# Patient Record
Sex: MALE | Race: WHITE | Employment: FULL TIME | URBAN - METROPOLITAN AREA
[De-identification: names, ages, dates, MRNs, and addresses within clinical notes are randomized per-mention and may not be internally consistent; named-entity substitution may affect disease eponyms.]

---

## 2017-02-28 ENCOUNTER — MEDICAL CORRESPONDENCE (OUTPATIENT)
Dept: HEALTH INFORMATION MANAGEMENT | Facility: CLINIC | Age: 32
End: 2017-02-28

## 2017-02-28 ENCOUNTER — OFFICE VISIT (OUTPATIENT)
Dept: CONSULT | Facility: CLINIC | Age: 32
End: 2017-02-28
Attending: GENETIC COUNSELOR, MS
Payer: COMMERCIAL

## 2017-02-28 DIAGNOSIS — M89.369: Primary | ICD-10-CM

## 2017-02-28 PROCEDURE — 96040 ZZH GENETIC COUNSELING, EACH 30 MINUTES: CPT | Mod: ZF | Performed by: GENETIC COUNSELOR, MS

## 2017-02-28 PROCEDURE — 81479 UNLISTED MOLECULAR PATHOLOGY: CPT | Performed by: MEDICAL GENETICS

## 2017-02-28 PROCEDURE — 81401 MOPATH PROCEDURE LEVEL 2: CPT | Performed by: MEDICAL GENETICS

## 2017-02-28 PROCEDURE — 84999 UNLISTED CHEMISTRY PROCEDURE: CPT | Performed by: MEDICAL GENETICS

## 2017-02-28 NOTE — PROGRESS NOTES
2017            Mr. Manuel Goddard   [address]   [facility] [address]      RE: Manuel Goddard   MRN: [id number]   : 1985      Dear Alcides:      It was a pleasure meeting with you on 2017 in our Genetics Clinic at the [facility].  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  Following today's visit, you had your blood drawn for Levy-Wiedemann methylation studies which were sent to [facility] and blood was also drawn and held for additional genetic studies WT1, PTEN and PIK3CA that would be performed here at the [facility] if the methylation studies are negative.  We will have our support staff submit a prior authorization for those additional studies.      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    You stated that you not have any other medical issues such as an omphalocele, large tongue or history of tumors.  You do have mild mitral valve prolapse.  You have an older brother and older sister who are healthy.  Your mom did have thyroid cancer in her 30s and 40s.  Otherwise, the remainder of your family history was unremarkable. (see scanned pedigree)     We reviewed that individuals having hypertrophy of one limb sometimes can have methylation differences and the Levy-Wiedemann gene.  Methylation studies are available through [facility].  In addition, sometimes individuals can have mutations in other genes that sometimes can be associated with other medical management issues.  These genes, including  WT1, PTEN, PIK3CA.  Thus, you were interested in just for general knowledge to find out if there is underlying etiology for your differences in your leg length.      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  As soon as the results become available in 2-3 weeks, we will contact you by phone.  Blood was also drawn and held for potential other studies at [facility], specifically WT1, PTEN and PIK3CA.  If the methylation studies are normal, then we will proceed with those studies.  In the meantime, we will have our support staff submit a prior authorization to your insurance for the additional gene studies.  If you have any further questions with which I can be of assistance, please feel free to contact me at [phone].      Sincerely,      Selma Baldwin MS, [facility] [place]   Certified Genetic Counselor   Time spent:  30 minutes     cc:   Jailene Ivory MD   [address] [address]

## 2017-02-28 NOTE — MR AVS SNAPSHOT
After Visit Summary   2/28/2017    Manuel Goddard    MRN: [id number]           Patient Information     Date Of Birth          1985        Visit Information        Provider Department      2/28/2017 2:00 PM Selma Baldwin [facility] Peds Genetics        Today's Diagnoses     Hypertrophy of bone of lower leg    -  1       Follow-ups after your visit        Your next 10 appointments already scheduled     Sep 19, 2017  7:45 AM CDT   New Genetic Visit with MD [facility] ([facility])    [facility]  [address]  [address] [facility] [address] [phone]              Who to contact     Please call your clinic at [phone] to:    Ask questions about your health    Make or cancel appointments    Discuss your medicines    Learn about your test results    Speak to your doctor   If you have compliments or concerns about an experience at your clinic, or if you wish to file a complaint, please contact [facility] Patient Relations at [phone] or email us at [email or web address]         Additional Information About Your Visit        MyChart Information     [employer] gives you secure access to your electronic health record. If you see a primary care provider, you can also send messages to your care team and make appointments. If you have questions, please call your primary care clinic.  If you do not have a primary care provider, please call [phone] and they will assist you.      [employer] is an electronic gateway that provides easy, online access to your medical records. With [employer], you can request a clinic appointment, read your test results, renew a prescription or communicate with your care team.     To access your existing account, please contact your [facility] or call [phone] for assistance.        Care EveryWhere ID     This is your Care EveryWhere ID. This could be used by  other organizations to access your [facility] medical records  [id number]         Blood Pressure from Last 3 Encounters:   02/26/16 117/68   09/25/15 123/74   06/17/15 109/71    Weight from Last 3 Encounters:   02/26/16 147 lb (66.7 kg)   09/25/15 151 lb (68.5 kg)   08/25/15 145 lb (65.8 kg)              We Performed the Following     [place] Miscellaneous Test     Next Generation Sequencing        Primary Care Provider Office Phone # Fax #    Jailene Elaine Ivory [phone]       [facility] [address] [facility] [address]  [facility] [address]        Thank you!     Thank you for choosing [facility]  for your care. Our goal is always to provide you with excellent care. Hearing back from our patients is one way we can continue to improve our services. Please take a few minutes to complete the written survey that you may receive in the mail after your visit with us. Thank you!             Your Updated Medication List - Protect others around you: Learn how to safely use, store and throw away your medicines at www.disposemymeds.org.          This list is accurate as of: 2/28/17 11:59 PM.  Always use your most recent med list.                   Brand Name Dispense Instructions for use    clonazePAM 1 MG tablet    klonoPIN    30 tablet    Take 1 tablet (1 mg) by mouth nightly as needed for anxiety

## 2017-02-28 NOTE — LETTER
2017      RE: Manuel Goddard  [address]     [facility] [address]       2017            Mr. Manuel Goddard   [address]   [facility] [address]      RE: Manuel Goddard   MRN: [id number]   : 1985      Dear Alcides:      It was a pleasure meeting with you on 2017 in our Genetics Clinic at the [facility].  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  Following today's visit, you had your blood drawn for Levy-Wiedemann methylation studies which were sent to [employer] [facility] and blood was also drawn and held for additional genetic studies WT1, PTEN and PIK3CA that would be performed here at the [facility] if the methylation studies are negative.  We will have our support staff submit a prior authorization for those additional studies.      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    You stated that you not have any other medical issues such as an omphalocele, large tongue or history of tumors.  You do have mild mitral valve prolapse.  You have an older brother and older sister who are healthy.  Your mom did have thyroid cancer in her 30s and 40s.  Otherwise, the remainder of your family history was unremarkable. (see scanned pedigree)     We reviewed that individuals having hypertrophy of one limb sometimes can have methylation differences and the Levy-Wiedemann gene.  Methylation studies are available through [employer].  In addition, sometimes individuals can have mutations in other  genes that sometimes can be associated with other medical management issues.  These genes, including WT1, PTEN, PIK3CA.  Thus, you were interested in just for general knowledge to find out if there is underlying etiology for your differences in your leg length.      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  As soon as the results become available in 2-3 weeks, we will contact you by phone.  Blood was also drawn and held for potential other studies at [facility], specifically WT1, PTEN and PIK3CA.  If the methylation studies are normal, then we will proceed with those studies.  In the meantime, we will have our support staff submit a prior authorization to your insurance for the additional gene studies.  If you have any further questions with which I can be of assistance, please feel free to contact me at [phone].      Sincerely,      Selma Baldwin MS, [facility] [place]   Certified Genetic Counselor   Time spent:  30 minutes     cc:   Jailene Ivory MD   [address] [address]

## 2017-03-14 LAB — COPATH REPORT: NORMAL

## 2017-03-15 ENCOUNTER — HOSPITAL ENCOUNTER (OUTPATIENT)
Facility: CLINIC | Age: 32
Setting detail: SPECIMEN
Discharge: HOME | End: 2017-03-15
Admitting: MEDICAL GENETICS
Payer: COMMERCIAL

## 2017-03-15 ENCOUNTER — TRANSFERRED RECORDS (OUTPATIENT)
Dept: HEALTH INFORMATION MANAGEMENT | Facility: CLINIC | Age: 32
End: 2017-03-15

## 2017-03-15 DIAGNOSIS — M89.369: Primary | ICD-10-CM

## 2017-03-15 LAB
RESULT: NORMAL
SEND OUTS MISC TEST CODE: NORMAL
SEND OUTS MISC TEST SPECIMEN: NORMAL
TEST NAME: NORMAL

## 2017-03-15 PROCEDURE — 81479 UNLISTED MOLECULAR PATHOLOGY: CPT | Performed by: MEDICAL GENETICS

## 2017-03-15 PROCEDURE — 40000803 ZZHCL STATISTIC DNA ISOL HIGH PURITY: Performed by: MEDICAL GENETICS

## 2017-03-15 PROCEDURE — 81405 MOPATH PROCEDURE LEVEL 6: CPT | Performed by: MEDICAL GENETICS

## 2017-03-15 PROCEDURE — 81321 PTEN GENE FULL SEQUENCE: CPT | Performed by: MEDICAL GENETICS

## 2017-03-15 NOTE — PROGRESS NOTES
WE HAVE RECEIVED APPROVAL FROM [facility] FOR GENETIC TESTING FOR WT1, PTEN and PIK3CA:    Manuel Goddard  Male, 31 yrs, 1985  MRN:   [id number]    Per Nicholas Saab  (see scanned letter).

## 2017-03-16 NOTE — LETTER
To Alcides Goddard   [address]     [facility] [address]      March 16, 2017      Dear Alcides,    As you know, we spoke with each other on 3/15/17 regarding normal BWRS methylation results. We informed you that your insurance was approved for the additional next generation sequencing genes.  Alcides wished to proceed with genes WT1, PTEN, PIK3CA and also wanted to add on AKT1. We informed lab and we will contact you again once these results are available.     Selam Baldwin MS, [facility] [place]  Certified Genetic Counselor  [phone]    SEE NOTE 03/15/2017 04:02 PM   (Note)     Test                            Result        Flag  Unit  RefValue   ------------------------------------------------------------------   BWS/RSS Molecular Analysis    Result Summary                NEGATIVE    Result                        SEE NOTE      IC1 (H19): normal methylation      IC2 (LIT1): normal methylation        No deletions or duplications were identified.    Interpretation                SEE NOTE      These results decrease the likelihood but do not rule out      the diagnosis of Levy-Wiedemann syndrome (BWS). Some      individuals with a clinical diagnosis of BWS do not have a      detectable abnormality in the 11p15 critical regions. This      assay does not assess for other causes of BWS, including      point mutations in the CDKN1C gene. If not already      performed, genetic testing of the CDKN1C gene (CDKZ/CDKN1C      Gene, Full Gene Analysis) may provide additional diagnostic      information.        Additionally, methylation abnormalities may not be detected      in the presence of low level mosaicism.        A genetic consultation may be of benefit.      Methylation-sensitive multiplex ligation-dependent probe      amplification (MLPA) was used to test for the presence of      large deletions, duplications and/or methylation defects in      the IC1 (H19) and IC2 (LIT1) critical regions on chromosome      11p15.      An  online research opportunity called GenomeConnect      (genomeconnect.org), a project of [employer], is available for      the recipient of this genetic test. This patient registry      collects de-identified genetic and health information to      advance the knowledge of genetic variants. [facility] is a      collaborator of [employer]. This may not be applicable for all      tests.        Test results should be interpreted in the context of      clinical findings, family history, and other laboratory      data. Misinterpretation of results may occur if the      information provided is inaccurate or incomplete.        Rare polymorphisms exist that could lead to false-negative      or false-positive results. If results obtained do not match      the clinical findings, additional testing should be      considered.        Bone Marrow transplants from allogenic donors will      interfere with testing. Call [facility] for      instructions for testing patients who have received a bone      marrow transplant.        Multiple in-silico evaluation tools may have been used to      assist in the interpretation of these results. Of note, the      sensitivity and specificity of these tools for the      determination of pathogenicity is currently unvalidated.        This test was developed and its performance characteristics      determined by [facility] in a manner consistent with CLIA      requirements. This test has not been cleared or approved by      the U.S. Food and Drug Administration.    Reason for Referral           SEE NOTE      Evaluation for a diagnosis of Levy Wiedemann syndrome      (BWS). Analyze the IC1 (H19) and IC2 (LIT1) critical      regions.    Specimen                      WB Whole Blood    Released By      Sandy Qureshi M.D.        Test Performed by:      [address] [address]      Test Name 02/28/2017  2:39  PM 13   GABE WIEDEMANN SYNDROME   Send Outs Misc Test Code 02/28/2017  2:39 PM 13   BWRS   Send Outs Misc Test Specimen 02/28/2017  2:39 PM 13   Whole blood, EDTA anticoagulant

## 2017-03-17 DIAGNOSIS — M89.369: Primary | ICD-10-CM

## 2017-04-26 LAB — COPATH REPORT: NORMAL

## 2017-04-27 NOTE — LETTER
To Manuel Goddard  [address]     [facility] [address]    April 27, 2017      Dear Alcides,    I left a voicemail message with you on 4/27/17 regarding the results of your recent genetic testing.  According to [facility], you have normal methylation studies of H19/Lit1.  According to [facility], Molecular diagnostics lab, no pathogenic variants were found in WTI, PTEN, PIK3CA, AKT1.    Feel free to contact me with additional questions.    Sincerely,    Selma Baldwin MS, [facility]  Certified Genetic Counselor  [phone]      BWS/RSS Molecular Analysis    Result Summary                NEGATIVE    Result                        SEE NOTE      IC1 (H19): normal methylation      IC2 (LIT1): normal methylation        No deletions or duplications were identified.    Interpretation                SEE NOTE      These results decrease the likelihood but do not rule out      the diagnosis of Levy-Wiedemann syndrome (BWS). Some      individuals with a clinical diagnosis of BWS do not have a      detectable abnormality in the 11p15 critical regions. This      assay does not assess for other causes of BWS, including      point mutations in the CDKN1C gene. If not already      performed, genetic testing of the CDKN1C gene (CDKZ/CDKN1C      Gene, Full Gene Analysis) may provide additional diagnostic      information.        Additionally, methylation abnormalities may not be detected      in the presence of low level mosaicism.        A genetic consultation may be of benefit.      Methylation-sensitive multiplex ligation-dependent probe      amplification (MLPA) was used to test for the presence of      large deletions, duplications and/or methylation defects in      the IC1 (H19) and IC2 (LIT1) critical regions on chromosome      11p15.      An online research opportunity called Home[employer]com      (RealLifeConnect.org), a project of [employer], is available for      the recipient of this genetic test. This patient  registry      collects de-identified genetic and health information to      advance the knowledge of genetic variants. [facility] is a      collaborator of [employer]. This may not be applicable for all      tests.        Test results should be interpreted in the context of      clinical findings, family history, and other laboratory      data. Misinterpretation of results may occur if the      information provided is inaccurate or incomplete.        Rare polymorphisms exist that could lead to false-negative      or false-positive results. If results obtained do not match      the clinical findings, additional testing should be      considered.        Bone Marrow transplants from allogenic donors will      interfere with testing. Call [facility] for      instructions for testing patients who have received a bone      marrow transplant.        Multiple in-silico evaluation tools may have been used to      assist in the interpretation of these results. Of note, the      sensitivity and specificity of these tools for the      determination of pathogenicity is currently unvalidated.        This test was developed and its performance characteristics      determined by [facility] in a manner consistent with CLIA      requirements. This test has not been cleared or approved by      the U.S. Food and Drug Administration.    Reason for Referral           SEE NOTE      Evaluation for a diagnosis of Levy Wiedemann syndrome      (BWS). Analyze the IC1 (H19) and IC2 (LIT1) critical      regions.    Specimen                      WB Whole Blood    Released By      Sandy Qureshi M.D.        Test Performed by:      [place] [address]       Patient Name: DOMONIQUE WEI   MR#: [id number]   Specimen #: [id number]   Collected: 2/28/2017 14:36   Received: 3/17/2017 11:33   Reported: 4/26/2017 16:28   Ordering Phy(s): VELASQUEZ A BERRY    Additional Phy(s): JOSH HOPPER     For improved result formatting, select 'View Enhanced Report Format'   under Linked Documents section.   _________________________________________     TEST(S) REQUESTED:   Next Generation Sequencing     SPECIMEN DESCRIPTION:   Blood     TEST REQUESTED: Hypertrophy of one limb. A customized panel. Next   generation sequencing and copy number variation analysis of: WT1, PTEN,   PIK3CA, AKT1.     RESULTS:          NEGATIVE     INTERPRETATION:   No pathogenic sequence variants or clinically significant copy number   variants were detected in the genes analyzed. Therefore, a genetic cause   for this patient's symptoms was not identified. It should be noted that   testing was run on a peripheral blood specimen, not the affected tissue.    This may miss somatic mutations of the tested genes that are associated   with the reported clinical phenotype.  Testing of affected tissue should   also be considered.  Genetic counseling regarding these results is   recommended.     BACKGROUND:   A custom panel of genes associated with hypertrophy of one limb was   requested by the ordering provider. Four genes were included, which are   WT1, PTEN, PIK3CA, and AKT1. The WT1 gene is associated with WT1-related   Wilms tumor syndromes. The PTEN gene is associated with PTEN hamartoma   tumor syndromes, which include Zyphrriz-Nhank-Geqdwguvm syndrome, Cowden   syndrome 1, PTEN-related Proteus syndrome, and Proteus-like Syndrome.   The PIK3CA and AKT1 genes have also been associated with Cowden   syndrome.     COVERAGE:   This analysis is limited to the coding exons and immediately adjoining   intronic sequences of the analyzed genes.  Unless specifically indicated   below, all analyzed coding exonic bases have either a minimum of 20x   coverage or have been analyzed by [place] sequencing.  Coverage at 20x is   not guaranteed for intronic positions. Therefore, intronic variants   cannot be confirmed or  excluded with the same degree of confidence as   coding exonic variants.  Sequences that reside more than 25 base pairs   from a coding exon are not genotyped and mutations in these regions will   not be detected by this analysis.     Any coding bases that are covered at less than 20x but greater than 15x   are specifically indicated below.  The sensitivity for detection of   heterozygous positions in these areas is reduced to 98.8-99.9% (assuming   the distribution of variant/reference calls is between 30%/70% and   70%/30%).     Gene     Coordinates     % of bases with >20x coverage   [facility]     [id number]     98.3     Any coding bases that are covered at less than 15x AND were not analyzed   by [facility] sequencing are reported below.  These regions are not [place]   sequenced in panels with greater than 25 genes or in cases where clearly   pathogenic mutation(s) are detected.  In addition, some regions cannot   be sequenced due to other variables such as GC content and/or repetitive   sequence.  Mutations cannot be confidently excluded in these regions.     Gene     Coordinates     % of bases with >15x coverage   None     METHODOLOGY [Abad et al., 2015][Harinder et al., 2014]:   Genomic DNA is extracted from the sample, and sequencing libraries are   prepared according to standard Illumina protocols utilizing the [employer]   One Sequencing Panel for enrichment of targeted genes.  The enriched DNA   libraries are sequenced on an Illumina HiSeq 2500 instrument.  Raw   sequencing reads are mapped to the reference genome using BWA. Raw   alignment files are realigned in the neighborhood of indels, and   recalibrated for base quality accuracy using the Genome Analysis Tool   Kit (GATK). Point mutation and indel calls in exons and adjoining   intronic regions are made using the GATK Unified Genotyper.  Variants   are interpreted according to guidance issued by the American College of   Medical Genetics [Mathew  "et al.2015].     Pathogenic and predicted pathogenic variants that meet ALL of the   following criteria are reported without validation by [facility] sequencin- single nucleotide substitution, 2- receives a \"PASS\" from the   variant recalibrator, 3- has a QUAL score of >300, 4- has a VAF in the   accepted range (heterozygous = 0.3-0.6, homozygous/hemizygous >0.9), and   5- has a minimum of 20x coverage.  Pathogenic variants that fail to meet   any of these criteria are verified by [place] sequencing prior to   reporting [Raymond et al., 2015].     For copy number variation (CNV) analysis, raw sequencing reads are   mapped to the reference human genome (HG19) using both BWA and Bowtie   algorithms.  CNV ratios are computed by comparing the average coverage   in the sample across 60 base pair windows.  Average coverage is compared   to control loci both within the sample and within a gender-matched   control sample from the same run.  The BWA/Bowtie coverage ratio is   calculated for each window in order to identify regions where the   presence of homologous sequences precludes accurate CNV calls.   Heterozygous deletion calls are made when 3 consecutive windows have a   CNV ratio of 0.3-0.7; homozygous/hemizygous deletion calls are made when   3 consecutive windows have a CNV ratio <0.3;duplication calls are made   when 5 consecutive windows have a CNV ratio >1.4.  Calls are only made   in areas where the BWA/Bowtie ratio is 0.9-1.1 and the average coverage   is 20x.  All CNV calls are confirmed with a supplementary qPCR assay.     ADDITIONAL VARIANTS IDENTIFIED:   The following types of variants are not included in the clinical report,   but information about these variants is available upon request:     * Missense variants that are present at >1% MAF in population databases   AND are not reported as pathogenic/likely pathogenic in ClinVar.   * Missense variants with a MAF <1%, that are classified as benign or "   likely benign according to published ACMG criteria.   * Synonymous variants that do not occur at intron/exon boundaries, are   not reported as pathogenic mutations in relevant clinical databases, and   are present in 15 or more individuals in ExAC.   * Intronic variants that reside more than 5 bases from the exon/intron   boundary AND are not reported as pathogenic/likely pathogenic in   ClinVar.  Known pathogenic variants residing 5-25bp from an intron/exon   boundary will be reported.   * Untranslated region (UTR) variants not previously categorized as   pathogenic or likely pathogenic in relevant clinical databases.   * Some variants may be excluded based on review of sequencing quality   data.  It is possible that some low quality variants are, in fact, real.     LIMITATIONS: This analysis has not been validated for detection of   insertion/deletion mutations larger than 18bp in length. In addition,   this analysis will not detect large structural variations, such as whole   gene deletions. The size of polymorphic repetitive sequences such as CAG   repeats, intronic dinucleotide repeats, or intronic polyT/polyA   sequences, cannot be determined by this analysis.     The CNV algorithm is not validated to detect deletions encompassing less   than 180 base pairs of coding sequence or duplications encompassing less   than 300 base pairs of coding sequence.  The CNV algorithm does not   define precise breakpoints for duplications or deletions.     REFERENCES:   Raymond MONROE, Susan WARREN, Sofia COTTRELL, Artur NAM, Harinder CARSON, et al. 2015.   Criteria for Clinical Reporting of Variants from a Broad Target Capture   NGS Assay without Donora Verification. JSM biomarkers 2(1):1005.     Sofia Hubbard, Artur Estevez, Desean M, Zeus KA,   Matthias B. 2016. CNV-RF Is a Random Bland-Based Copy Number   Variation Detection Method Using Next-Generation Sequencing. J Mol   Diagn. 18(6):872-881.     Harinder CARSON,  Darci Reeves MD, Monica PARSONS, Alessandra CHU, Forest OLGUIN, Abad KWON,   Desean WARREN, Susan WARREN, Zeus SAMUEL, Matthias B. 2014.   Implementation of Cloud based next generation sequencing data analysis   in a clinical laboratory. BMC Res Notes. 7:314.     Mathew CS, Jovany S, Wendi DB, Harris S, Reginald WW, Sergio MR, Everardo E,   Macho BE, Molecular Subcommittee of the ACMG Laboratory    Committee. 2008. ACMG recommendations for standards for interpretation   and reporting of sequence variations: Revisions 2007. [place]. Med.   10(4):294-300.     Nunu JA, Juanita AW, O'Mike TD, Prabhu W, Victor Hugo EE, Gisselle S, Ale S,    R, Wilson X, Eliazar G, Geronimo HM, Jesse D, Amara SM, Kt S, Moy MJ,   Robbin G, Katerine D, Ijeoma DA, Juno E, Chencho S,   Chriss CD, Blanca TOLEDO, Mayank JM, [facility] GO, Marshall GO, NHL Exome   Sequencing Project. 2012. Evolution and functional impact of rare coding   variation from deep sequencing of human exomes. Science. 337(7490):64-9.     Abad KWON, Forest OLGUIN, Yissel K, Huma T, Susan WARREN, Desean WARREN, Harinder CARSON,   Lemuel PARSONS, Denny PARSONS, Rah Y, Darci Nguyen MD, Alessandra STARKEY,   Zeus SAMUEL, Matthias B. 2015. Clinical validation of targeted   next-generation sequencing for inherited disorders. Arch. Pathol. Lab.   Med. 139(2):204-10.     If a patient is the recipient of an allogeneic bone marrow transplant,   this test must be done on a pre-transplant sample or buccal swab.  A   previous allogeneic bone marrow transplant will interfere with test   results.  Call the [facility]([phone]) for   instructions on sample collection for these patients.     This test was developed and its performance characteristics determined   by the [facility],  Molecular Diagnostics   Laboratory and the [facility](Cancer &   Cardiovascular Research Building Room 1210, [address] [place] [address]).    Genomic DNA extraction, interpretation of   sequencing data, and when necessary, [place] sequencing is performed at   [facility],  Molecular Diagnostics   Laboratory.   Wet bench processes including library creation, sequence   capture using an Illumina SenionLab 2500 analyzer and bioinformatics is   performed at the [facility].  It has not   been cleared or approved by the FDA. The laboratory is regulated under   CLIA as qualified to perform high-complexity testing. This test is used   for clinical purposes. It should not be regarded as investigational or   for research.     A resident/fellow in an accredited training program was involved in the   selection of testing, review of laboratory data, and/or interpretation   of this case.  I, as the senior physician, attest that I: (i) confirmed   appropriate testing, (ii) examined the relevant raw data for the   specimen(s); and (iii) rendered or confirmed the interpretation(s).     Electronically Signed Out By:   LUCAS Mccauley     CPT Codes:    07574-XVY     TESTING LAB LOCATION:   [address] [id number] [phone]     COLLECTION SITE:   Client:  [facility]   Location:  [facility] (B)

## 2017-04-27 NOTE — PROGRESS NOTES
4/27/17.  Left voicemail message with Alcides regarding normal/negative genetic test results for both Next gen sequencing and methylation studies.  Results letter sent.    Selma Baldwin MS, [facility]  Certified Genetic Counselor  [phone]

## 2017-07-17 ENCOUNTER — MYC REFILL (OUTPATIENT)
Dept: INTERNAL MEDICINE | Facility: CLINIC | Age: 32
End: 2017-07-17

## 2017-07-17 DIAGNOSIS — G47.00: ICD-10-CM

## 2017-07-17 RX ORDER — CLONAZEPAM 1 MG/1
1 TABLET ORAL
Qty: 30 TABLET | Refills: 2 | Status: CANCELLED | OUTPATIENT
Start: 2017-07-17

## 2017-07-20 NOTE — TELEPHONE ENCOUNTER
Patient requesting refill of clonazepam.   Last appointment in [facility] was 2/26/16. No upcoming appointment scheduled.   Patient needs to be seen before clonazepam can be refilled.

## 2017-07-20 NOTE — TELEPHONE ENCOUNTER
Message from Xochilt:  Fabiana Lei, RN Tue Jul 18, 2017 7:57 AM        ----- Message -----   From: Manuel Goddard   Sent: 7/17/2017 7:35 PM   To: [facility] Nursing Staff-  Subject: Medication Renewal Request     Original authorizing provider: MD Manuel Corrales would like a refill of the following medications:  clonazePAM (KLONOPIN) 1 MG tablet [Jailene Ivory MD]    Preferred pharmacy: [address]    Comment:  Hi Dr. Ivory-- I am running low on this prescription and would like to request a new script. Thanks for your time-- Manuel

## 2017-08-09 DIAGNOSIS — M25.571 PAIN IN JOINT, ANKLE AND FOOT, RIGHT: Primary | ICD-10-CM

## 2017-08-15 ENCOUNTER — OFFICE VISIT (OUTPATIENT)
Dept: ORTHOPEDICS | Facility: CLINIC | Age: 32
End: 2017-08-15

## 2017-08-15 VITALS — BODY MASS INDEX: 19.22 KG/M2 | HEIGHT: 73 IN | WEIGHT: 145 LBS

## 2017-08-15 DIAGNOSIS — M25.571 PAIN IN JOINT, ANKLE AND FOOT, RIGHT: Primary | ICD-10-CM

## 2017-08-15 NOTE — NURSING NOTE
"Reason For Visit:   Chief Complaint   Patient presents with     RECHECK     Pt. states that he is here today for Right Greater Toe Pain and Discomfort. He mention that he been having pain ongoing for a year, no injury. HX: Right great toe partial amputation, soft tissue debulking procedure.  DOS: 10/2/2015                       HEIGHT: 6' 1\", WEIGHT: 145 lbs 0 oz, BMI: Body mass index is 19.13 kg/(m^2).      Current Outpatient Prescriptions   Medication Sig Dispense Refill     clonazePAM (KLONOPIN) 1 MG tablet Take 1 tablet (1 mg) by mouth nightly as needed for anxiety 30 tablet 2        No Known Allergies            "

## 2017-08-15 NOTE — LETTER
8/15/2017       RE: Manuel Goddard  [address]     [facility] [address]     Dear Colleague,    Thank you for referring your patient, Manuel Goddard, to the [facility] at [facility]. Please see a copy of my visit note below.    CHIEF COMPLAINT:  Status post right great toe partial excision performed on 10/02/2015.      HISTORY OF PRESENT ILLNESS:  Mr. Goddard presents today for further followup.  Overall, he reports to be doing well.  Reports to have been doing quite well for the first year after surgery.  Now reports to have some pain and discomfort which seems to be located along the plantar aspect of the first MTP joint.  Reports to be more comfortable with shoes than without shoes.  The patient has not had any formal treatment for this.      PHYSICAL EXAMINATION:  On today's visit, he presents with a limited range of motion across the ankle joint.  Presents with a fairly enlarged great toe which is unchanged from before and presents with some pain along the plantar aspect of the sesamoid complex.  There is no gross motion across the first MTP joint.      RADIOGRAPHIC EVALUATION:  Plain x-rays of the foot were reviewed today which were significant for showing the absence of the distal phalanx on the great toe as a result of our debridement as well as a large amount of bone consolidation across the first MTP joint plantarly.  Presents also with very arthritic ankle and hindfoot joints.      ASSESSMENT:     1.  Status post right great toe partial excision.   2.  Right great toe sesamoiditis.      PLAN:  I discussed with the patient that I think it is reasonable to proceed with the use of custom orthotics for sesamoiditis with a sesamoid recess and a medial post.  If this is not successful in improving his symptoms, we will consider the possibility of an excision of the plantar bony structures.      All questions were answered.  The patient  was pleased with the discussion.  The patient will follow up accordingly.      TT 15 minutes, CT 10 minutes.       Jordan Correa MD

## 2017-08-15 NOTE — PROGRESS NOTES
CHIEF COMPLAINT:  Status post right great toe partial excision performed on 10/02/2015.      HISTORY OF PRESENT ILLNESS:  Mr. Goddard presents today for further followup.  Overall, he reports to be doing well.  Reports to have been doing quite well for the first year after surgery.  Now reports to have some pain and discomfort which seems to be located along the plantar aspect of the first MTP joint.  Reports to be more comfortable with shoes than without shoes.  The patient has not had any formal treatment for this.      PHYSICAL EXAMINATION:  On today's visit, he presents with a limited range of motion across the ankle joint.  Presents with a fairly enlarged great toe which is unchanged from before and presents with some pain along the plantar aspect of the sesamoid complex.  There is no gross motion across the first MTP joint.      RADIOGRAPHIC EVALUATION:  Plain x-rays of the foot were reviewed today which were significant for showing the absence of the distal phalanx on the great toe as a result of our debridement as well as a large amount of bone consolidation across the first MTP joint plantarly.  Presents also with very arthritic ankle and hindfoot joints.      ASSESSMENT:     1.  Status post right great toe partial excision.   2.  Right great toe sesamoiditis.      PLAN:  I discussed with the patient that I think it is reasonable to proceed with the use of custom orthotics for sesamoiditis with a sesamoid recess and a medial post.  If this is not successful in improving his symptoms, we will consider the possibility of an excision of the plantar bony structures.      All questions were answered.  The patient was pleased with the discussion.  The patient will follow up accordingly.      TT 15 minutes, CT 10 minutes.

## 2017-08-15 NOTE — MR AVS SNAPSHOT
After Visit Summary   8/15/2017    Manuel Goddard    MRN: [id number]           Patient Information     Date Of Birth          1985        Visit Information        Provider Department      8/15/2017 9:10 AM Jordan Correa MD [facility]        Today's Diagnoses     Pain in joint, ankle and foot, right    -  1       Follow-ups after your visit        Additional Services     ORTHOTICS REFERRAL (Foot and Ankle)       Please be aware that coverage of these services is subject to the terms and limitations of your health insurance plan.  Call member services at your health plan with any benefit or coverage questions.      Please bring the following to your appointment:    >>   Any x-rays, CTs or MRIs which have been performed.  Contact the facility where they were done to arrange for  prior to your scheduled appointment.  Any new CT, MRI or other procedures ordered by your specialist must be performed at a [facility] facility or coordinated by your clinic's referral office.    >>   List of current medications   >>   This referral request   >>   Any documents/labs given to you for this referral    ==This Referral PRINTS in the [facility] (ORTHOTICS & PROSTHETICS) Central scheduling office ==     The [facility] Scheduling staff will contact patient to arrange appointments. Central Scheduling Phone #:  [place], [place]  [phone]     Orthotics: Foot Orthotics    FOOT AND ANKLE ORTHOTIC PRESCRIPTION:  Full Length Foot Orthotic:  Sesamoid recess and medial post: Right                  Your next 10 appointments already scheduled     Sep 08, 2017  7:00 AM CDT   (Arrive by 6:45 AM)   PHYSICAL with Jailene Ivory MD   [facility] ([facility])    [address] [place] [id number] [phone]            Sep 19, 2017  7:45 AM CDT   New Genetic Visit with Lexi Giron MD  "  Peds Genetics ([facility])    [facility]  [address]  [address] [facility] [address] [phone]            Sep 19, 2017  8:00 AM CDT   Genetic Counseling with Selma Baldwin GC   Peds Genetics ([facility])    [facility]  [address]  [address] [facility] [id number] [phone]              Who to contact     Please call your clinic at [phone] to:    Ask questions about your health    Make or cancel appointments    Discuss your medicines    Learn about your test results    Speak to your doctor   If you have compliments or concerns about an experience at your clinic, or if you wish to file a complaint, please contact [facility] Patient Relations at [phone] or email us at [email or web address]         Additional Information About Your Visit        [facility] Information     [employer] gives you secure access to your electronic health record. If you see a primary care provider, you can also send messages to your care team and make appointments. If you have questions, please call your primary care clinic.  If you do not have a primary care provider, please call [phone] and they will assist you.      [employer] is an electronic gateway that provides easy, online access to your medical records. With [employer], you can request a clinic appointment, read your test results, renew a prescription or communicate with your care team.     To access your existing account, please contact your [facility] or call [phone] for assistance.        Care EveryWhere ID     This is your Care EveryWhere ID. This could be used by other organizations to access your [facility] medical records  [id number]        Your Vitals Were     Height BMI (Body Mass Index)                1.854 m (6' 1\") 19.13 kg/m2           Blood Pressure from Last 3 Encounters:   02/26/16 117/68   09/25/15 123/74   06/17/15 109/71 "    Weight from Last 3 Encounters:   08/15/17 65.8 kg (145 lb)   02/26/16 66.7 kg (147 lb)   09/25/15 68.5 kg (151 lb)              We Performed the Following     ORTHOTICS REFERRAL (Foot and Ankle)        Primary Care Provider Office Phone # Fax #    Jailene Elaine Ivory [phone]       [address] [place] [address] [address]  [facility] [address]        Equal Access to Services     JODY MARX : Hadii aad ku hadasho Soomaali, waaxda luqadaha, qaybta kaalmada adeegyada, waxay idiin hayaan adeeg kharash la'coni . So [facility] [phone].    ATENCIÓN: Si habla español, tiene a delvalle disposición servicios gratuitos de asistencia lingüística. [facility] [place] [phone].    We comply with applicable federal civil rights laws and [place] laws. We do not discriminate on the basis of race, color, national origin, age, disability sex, sexual orientation or gender identity.            Thank you!     Thank you for choosing [facility]  for your care. Our goal is always to provide you with excellent care. Hearing back from our patients is one way we can continue to improve our services. Please take a few minutes to complete the written survey that you may receive in the mail after your visit with us. Thank you!             Your Updated Medication List - Protect others around you: Learn how to safely use, store and throw away your medicines at www.disposemymeds.org.          This list is accurate as of: 8/15/17 11:59 PM.  Always use your most recent med list.                   Brand Name Dispense Instructions for use Diagnosis    clonazePAM 1 MG tablet    klonoPIN    30 tablet    Take 1 tablet (1 mg) by mouth nightly as needed for anxiety    Insomnia, unspecified insomnia

## 2017-09-08 ENCOUNTER — OFFICE VISIT (OUTPATIENT)
Dept: INTERNAL MEDICINE | Facility: CLINIC | Age: 32
End: 2017-09-08

## 2017-09-08 VITALS
SYSTOLIC BLOOD PRESSURE: 128 MMHG | DIASTOLIC BLOOD PRESSURE: 82 MMHG | OXYGEN SATURATION: 98 % | HEIGHT: 71 IN | WEIGHT: 151.9 LBS | BODY MASS INDEX: 21.27 KG/M2 | HEART RATE: 63 BPM | RESPIRATION RATE: 18 BRPM | TEMPERATURE: 97.7 F

## 2017-09-08 DIAGNOSIS — G47.00 INSOMNIA, UNSPECIFIED TYPE: Primary | ICD-10-CM

## 2017-09-08 RX ORDER — CLONAZEPAM 1 MG/1
1 TABLET ORAL
Qty: 30 TABLET | Refills: 2 | Status: SHIPPED | OUTPATIENT
Start: 2017-09-08 | End: 2021-09-29

## 2017-09-08 ASSESSMENT — ENCOUNTER SYMPTOMS
HALLUCINATIONS: 0
CLAUDICATION: 0
LEG SWELLING: 0
DIZZINESS: 0
WEIGHT GAIN: 0
HOARSE VOICE: 0
POLYDIPSIA: 0
JOINT SWELLING: 0
LOSS OF CONSCIOUSNESS: 0
EYE REDNESS: 0
SEIZURES: 0
COUGH: 0
FLANK PAIN: 0
PALPITATIONS: 0
DIARRHEA: 0
HYPERTENSION: 0
DECREASED CONCENTRATION: 0
ALTERED TEMPERATURE REGULATION: 0
TASTE DISTURBANCE: 0
DISTURBANCES IN COORDINATION: 0
EXERCISE INTOLERANCE: 0
MEMORY LOSS: 0
NERVOUS/ANXIOUS: 0
BACK PAIN: 0
NUMBNESS: 0
DYSURIA: 0
TACHYCARDIA: 0
SWOLLEN GLANDS: 0
POSTURAL DYSPNEA: 0
NIGHT SWEATS: 0
HEARTBURN: 0
MUSCLE CRAMPS: 0
SKIN CHANGES: 0
WEAKNESS: 0
BOWEL INCONTINENCE: 0
SINUS CONGESTION: 0
PANIC: 0
TREMORS: 0
SNORES LOUDLY: 0
CONSTIPATION: 0
BRUISES/BLEEDS EASILY: 0
DIFFICULTY URINATING: 0
SPEECH CHANGE: 0
DECREASED APPETITE: 0
POLYPHAGIA: 0
RECTAL BLEEDING: 0
INCREASED ENERGY: 0
PARALYSIS: 0
SLEEP DISTURBANCES DUE TO BREATHING: 0
EYE WATERING: 0
BLOOD IN STOOL: 0
SHORTNESS OF BREATH: 0
HYPOTENSION: 0
MUSCLE WEAKNESS: 0
VOMITING: 0
EYE PAIN: 0
FEVER: 0
POOR WOUND HEALING: 0
LIGHT-HEADEDNESS: 0
EYE IRRITATION: 0
CHILLS: 0
ORTHOPNEA: 0
ARTHRALGIAS: 0
FATIGUE: 0
RECTAL PAIN: 0
NAUSEA: 0
NECK MASS: 0
NECK PAIN: 0
ABDOMINAL PAIN: 0
WHEEZING: 0
HEADACHES: 0
JAUNDICE: 0
DEPRESSION: 0
TROUBLE SWALLOWING: 0
TINGLING: 0
BLOATING: 0
SINUS PAIN: 0
INSOMNIA: 0
SORE THROAT: 0
EXTREMITY NUMBNESS: 0
DYSPNEA ON EXERTION: 0
RESPIRATORY PAIN: 0
SYNCOPE: 0
HEMOPTYSIS: 0
DOUBLE VISION: 0
STIFFNESS: 0
HEMATURIA: 0
SMELL DISTURBANCE: 0
SPUTUM PRODUCTION: 0
MYALGIAS: 0
NAIL CHANGES: 0
WEIGHT LOSS: 0
LEG PAIN: 0
COUGH DISTURBING SLEEP: 0

## 2017-09-08 ASSESSMENT — PAIN SCALES - GENERAL: PAINLEVEL: NO PAIN (0)

## 2017-09-08 NOTE — PATIENT INSTRUCTIONS
[facility] Medication Refill Request Information:  * Please contact your pharmacy regarding ANY request for medication refills.  ** [facility] Prescription Fax = [phone]  * Please allow 3 business days for routine medication refills.  * Please allow 5 business days for controlled substance medication refills.     [facility] Test Result notification information:  *You will be notified with in 7-10 days of your appointment day regarding the results of your test.  If you are on MyChart you will be notified as soon as the provider has reviewed the results and signed off on them.    [facility] [phone]

## 2017-09-08 NOTE — NURSING NOTE
Chief Complaint   Patient presents with     Physical     Here for annual physical exam     Refill Request     Also here for clonazepam refills     Geovany Chao CMA (AAMA) at 7:20 AM on 9/8/2017

## 2017-09-08 NOTE — MR AVS SNAPSHOT
After Visit Summary   9/8/2017    Manuel Goddard    MRN: [id number]           Patient Information     Date Of Birth          1985        Visit Information        Provider Department      9/8/2017 7:00 AM Jailene Ivory MD [facility]        Today's Diagnoses     Insomnia, unspecified type    -  1      Care Instructions    Primary Care Center Medication Refill Request Information:  * Please contact your pharmacy regarding ANY request for medication refills.  ** [facility] Prescription Fax = [phone]  * Please allow 3 business days for routine medication refills.  * Please allow 5 business days for controlled substance medication refills.     Primary Care Center Test Result notification information:  *You will be notified with in 7-10 days of your appointment day regarding the results of your test.  If you are on MyChart you will be notified as soon as the provider has reviewed the results and signed off on them.    [facility] [phone]             Follow-ups after your visit        Your next 10 appointments already scheduled     Sep 27, 2017  1:00 PM CDT   (Arrive by 12:45 PM)   Lymphedema Evaluation with Dixie Cam PT   [facility] ([facility])    [address] [place] [address] [phone]              Who to contact     Please call your clinic at [phone] to:    Ask questions about your health    Make or cancel appointments    Discuss your medicines    Learn about your test results    Speak to your doctor   If you have compliments or concerns about an experience at your clinic, or if you wish to file a complaint, please contact [facility] Patient Relations at [phone] or email us at [email or web address]         Additional Information About Your Visit        MyChart Information     [employer] gives you secure access to your  "electronic health record. If you see a primary care provider, you can also send messages to your care team and make appointments. If you have questions, please call your primary care clinic.  If you do not have a primary care provider, please call [phone] and they will assist you.      [employer] is an electronic gateway that provides easy, online access to your medical records. With [employer], you can request a clinic appointment, read your test results, renew a prescription or communicate with your care team.     To access your existing account, please contact your [facility] or call [phone] for assistance.        Care EveryWhere ID     This is your Care EveryWhere ID. This could be used by other organizations to access your [place] medical records  [id number]        Your Vitals Were     Pulse Temperature Respirations Height Pulse Oximetry BMI (Body Mass Index)    63 97.7  F (36.5  C) (Oral) 18 1.81 m (5' 11.25\") 98% 21.04 kg/m2       Blood Pressure from Last 3 Encounters:   09/19/17 108/76   09/08/17 128/82   02/26/16 117/68    Weight from Last 3 Encounters:   09/19/17 68.9 kg (151 lb 14.4 oz)   09/08/17 68.9 kg (151 lb 14.4 oz)   08/15/17 65.8 kg (145 lb)              Today, you had the following     No orders found for display         Where to get your medicines      Some of these will need a paper prescription and others can be bought over the counter.  Ask your nurse if you have questions.     Bring a paper prescription for each of these medications     clonazePAM 1 MG tablet          Primary Care Provider Office Phone # Fax #    Jailene Ivory [phone]       [address] [place] [address] [address]  [facility] [address]        Equal Access to Services     JODY MARX : Donna Serrano, jasmin dick, mil garcia. So [facility] [phone].    ATENCIÓN: Si zaki espjosette, dino a delvalle " disposición servicios gratuitos de asistencia lingüística. Claudine franz [phone].    We comply with applicable federal civil rights laws and [place] laws. We do not discriminate on the basis of race, color, national origin, age, disability sex, sexual orientation or gender identity.            Thank you!     Thank you for choosing [facility]  for your care. Our goal is always to provide you with excellent care. Hearing back from our patients is one way we can continue to improve our services. Please take a few minutes to complete the written survey that you may receive in the mail after your visit with us. Thank you!             Your Updated Medication List - Protect others around you: Learn how to safely use, store and throw away your medicines at www.disposemymeds.org.          This list is accurate as of: 9/8/17 11:59 PM.  Always use your most recent med list.                   Brand Name Dispense Instructions for use Diagnosis    clonazePAM 1 MG tablet    klonoPIN    30 tablet    Take 1 tablet (1 mg) by mouth nightly as needed for anxiety    Insomnia, unspecified type

## 2017-09-08 NOTE — PROGRESS NOTES
Chief complaint:  Manuel Goddard is a 31 year old male presents for refill of clonazepam.  Chief Complaint   Patient presents with     Physical     Here for annual physical exam     Refill Request     Also here for clonazepam refills        SUBJECTIVE:  Manuel is a 31 year old male with a history of right lower leg hypertrophy. He is overall doing well, having the same amount of pain in his right leg as is typical, and has been following up with orthopedics. The patient reports he uses his clonazepam once per month when he has trouble falling asleep.    Medications and allergies were reviewed and updated in the chart.     SocHx:   History   Smoking Status     Never Smoker   Smokeless Tobacco     Never Used       Family history and PMH reviewed and updated in chart    Patient Active Problem List    Diagnosis Date Noted     Hypertrophy of bone of lower leg 06/17/2015     Priority: Medium     Since birth, hypertrophy of R leg/foot.  Mostly hypertrophy of fatty tissue -- ?benign lipoma; however, required mult debulking surgeries and stunding of the growth plate.         Review of Systems     Constitutional:  Negative for fever, chills, weight loss, weight gain, fatigue, decreased appetite, night sweats, recent stressors, height gain, height loss, post-operative complications, incisional pain, hallucinations, increased energy, hyperactivity and confused.   HENT:  Negative for ear pain, hearing loss, tinnitus, nosebleeds, trouble swallowing, hoarse voice, mouth sores, sore throat, ear discharge, tooth pain, gum tenderness, taste disturbance, smell disturbance, hearing aid, bleeding gums, dry mouth, sinus pain, sinus congestion and neck mass.    Eyes:  Negative for double vision, pain, redness, eye pain, decreased vision, eye watering, eye bulging, eye dryness, flashing lights, spots, floaters, strabismus, tunnel vision, jaundice and eye irritation.   Respiratory:   Negative for cough, hemoptysis, sputum production,  shortness of breath, wheezing, sleep disturbances due to breathing, snores loudly, respiratory pain, dyspnea on exertion, cough disturbing sleep and postural dyspnea.    Cardiovascular:  Negative for chest pain, dyspnea on exertion, palpitations, orthopnea, claudication, leg swelling, fingers/toes turn blue, hypertension, hypotension, syncope, history of heart murmur, chest pain on exertion, chest pain at rest, pacemaker, few scattered varicosities, leg pain, sleep disturbances due to breathing, tachycardia, light-headedness, exercise intolerance and edema.   Gastrointestinal:  Negative for heartburn, nausea, vomiting, abdominal pain, diarrhea, constipation, blood in stool, melena, rectal pain, bloating, hemorrhoids, bowel incontinence, jaundice, rectal bleeding, coffee ground emesis and change in stool.   Genitourinary:  Negative for bladder incontinence, dysuria, urgency, hematuria, flank pain, difficulty urinating, nocturia, voiding less frequently, scrotal pain, ulcerations, penile discharge, male genitourinary complaint and reduced libido.   Musculoskeletal:  Negative for myalgias, back pain, joint swelling, arthralgias, stiffness, muscle cramps, neck pain, bone pain, muscle weakness and fracture.   Skin:  Negative for nail changes, itching, poor wound healing, rash, hair changes, skin changes, acne, warts, poor wound healing, scarring, flaky skin, Raynaud's phenomenon, sensitivity to sunlight and skin thickening.   Neurological:  Negative for dizziness, tingling, tremors, speech change, seizures, loss of consciousness, weakness, light-headedness, numbness, headaches, disturbances in coordination, extremity numbness, memory loss, difficulty walking and paralysis.   Endo/Heme:  Negative for anemia, swollen glands and bruises/bleeds easily.   Psychiatric/Behavioral:  Negative for depression, hallucinations, memory loss, decreased concentration, mood swings and panic attacks.    Endocrine:  Negative for altered  "temperature regulation, polyphagia, polydipsia, unwanted hair growth and change in facial hair.  All other systems reviewed and are negative.      /82 (BP Location: Right arm, Patient Position: Chair, Cuff Size: Adult Regular)  Pulse 63  Temp 97.7  F (36.5  C) (Oral)  Resp 18  Ht 1.81 m (5' 11.25\")  Wt 68.9 kg (151 lb 14.4 oz)  SpO2 98%  BMI 21.04 kg/m2  Physical Exam   Constitutional: He is oriented to person, place, and time. He appears well-developed and well-nourished.   HENT:   Head: Normocephalic and atraumatic.   Eyes: Conjunctivae and EOM are normal. Pupils are equal, round, and reactive to light.   Neck: Normal range of motion. Neck supple.   Cardiovascular: Normal rate, regular rhythm, normal heart sounds and intact distal pulses.    Pulmonary/Chest: Effort normal and breath sounds normal.   Abdominal: Soft.   Musculoskeletal: He exhibits no edema.   Neurological: He is alert and oriented to person, place, and time.   Skin: Skin is warm and dry.   Psychiatric: He has a normal mood and affect. His behavior is normal. Judgment and thought content normal.   Vitals reviewed.      ASSESSMENT/PLAN:  Insomnia, unspecified type  - clonazePAM (KLONOPIN) 1 MG tablet  Dispense: 30 tablet; Refill: 2    Recommend to get flu vaccine when it becomes available, and may be offered by the [employer] where the patient works.     RTC: As needed or for annual physical    Scribe Disclosure:   I, Lanie Rebolledo, am serving as a scribe; to document services personally performed by Dr. Ivory -based on data collection and the provider's statements to me.     Provider Disclosure:  I agree with above History, Review of Systems, Physical exam and Plan.  I have reviewed the content of the documentation and have edited it as needed. I have personally performed the services documented here and the documentation accurately represents those services and the decisions I have made.      "

## 2017-09-19 ENCOUNTER — OFFICE VISIT (OUTPATIENT)
Dept: CONSULT | Facility: CLINIC | Age: 32
End: 2017-09-19
Attending: GENETIC COUNSELOR, MS
Payer: COMMERCIAL

## 2017-09-19 VITALS
SYSTOLIC BLOOD PRESSURE: 108 MMHG | HEART RATE: 103 BPM | BODY MASS INDEX: 21.27 KG/M2 | WEIGHT: 151.9 LBS | HEIGHT: 71 IN | DIASTOLIC BLOOD PRESSURE: 76 MMHG

## 2017-09-19 DIAGNOSIS — M89.369: ICD-10-CM

## 2017-09-19 DIAGNOSIS — I89.0 LYMPHEDEMA OF RIGHT LOWER EXTREMITY: ICD-10-CM

## 2017-09-19 DIAGNOSIS — I89.0 LYMPHEDEMA OF RIGHT LOWER EXTREMITY: Primary | ICD-10-CM

## 2017-09-19 DIAGNOSIS — M89.369: Primary | ICD-10-CM

## 2017-09-19 PROCEDURE — 99213 OFFICE O/P EST LOW 20 MIN: CPT | Mod: ZF

## 2017-09-19 PROCEDURE — 40000072 ZZH STATISTIC GENETIC COUNSELING, < 16 MIN: Mod: ZF | Performed by: GENETIC COUNSELOR, MS

## 2017-09-19 NOTE — PROGRESS NOTES
GENETICS CLINIC   Consultation    Name:  Manuel Goddard  :   1985  MRN:   [id number]  Primary Provider: Jailene Ivory  Referring Provider: Jailene Ivory    Date of service: Sep 19, 2017    Reason for visit:  A consultation in the Genetics Clinic was requested by Jailene Ivory for Manuel, a 31 year old man, for evaluation of right lower leg enlargement with hypertrophy of the right great toe.     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 This is resulted in marked hypertrophy of the right first toe and generalized overgrowth of the bones of the lower segment. He has considerable degenerative joint progression in the right foot with some bone spurring. I don't see evidence that there is substantial vascular involvement in this structural physical difference.    Initial genetic testing did not yield an underlying diagnosis. On physical assessment, his findings are not suggestive of a systemic syndromic diagnosis but rather more likely represent a segmental or somatic pattern of altered gene expression. Somatic or mosaic distribution of abnormalities of genes in the PI3K/mTOR pathway are associated with overgrowth syndromes. At the time that we did his original testing, we chose the most commonly associated genes. We will now return to examine some additional genes in this pathway,    Plan:    We will test some additional single genes for possible contribution to Dr. Goddard's phenotype. However, because his condition is likely due to somatic mosaicism for an as yet undetermined genetic variation, if a genetic change cannot be ascertained by assessment of peripheral blood, consideration should be undertaken for performance of a skin biopsy in the affected field.    We also discussed the option of whole exome sequencing, but this would only be  optimally recommended if a skin biopsy was undertaken.    Ordered at this visit:  Orders Placed This Encounter   Procedures     GENETIC COUNSELING SERVICES     PHYSICAL THERAPY REFERRAL   Physical therapy referral is for lymphedema management with compression garment.    Genetic [place] [facility] [place] regarding additional genetic testing. We will add testing for AKT2 and NSD1.    Will review optimal imaging of leg lengths with radiology, then update Dr. Goddard about plans for imaging. It may be appropriate to consider a more substantial equalization of his limb size by orthopedic appliance. This will depend on the degree of length discrepancy. I'm concerned that if we don't do a better job of balancing his leg lengths that ultimately he will have hip and back pain related to alteration of his gait.    I will briefly review his case with our rheumatology team. I don't know if there might be interventions that would provide better comfort given the degree of bony degenerative change in his x-rays.     Return to Genetics Clinic for follow-up as needed pending results of testing.      -----  History of Present Illness:  Patient Active Problem List    Diagnosis Date Noted     Hypertrophy of bone of lower leg 06/17/2015     Priority: Medium     Since birth, hypertrophy of R leg/foot.  Mostly hypertrophy of fatty tissue -- ?benign lipoma; however, required mult debulking surgeries and stunding of the growth plate.        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 In the first six months of life, asymmetry emerged, particularly his right great toe. With time, the right leg and foot grew faster and longer. His first surgery was for debulking of his toe and removal of toenail. He had regular follow-up examinations, primarily at the [facility] for monitoring of his growth. Ultimately, he had a procedure to stunt  "growth in the affected limb. He also had procedures to debulk what he thought was characterized as \"fatty tissue\" but this was not fully successful and the bulkiness of his knee recurred. These initial procedures took place sometime in his teen years. Subsequently, he had debulking of the right ankle. This was most helpful in assisting in accommodating shoes. He also had a second debulking procedure of the right knee sometime in high school.     During his college years, ankle bone spurs reduced his range of motion and were removed. He indicates that with time, these have slowly recurred. About two years ago, he had some bone in his right great toe removed because of pain; the procedure also did some debulking of the right great toe.  At that time, he had a painful, poorly healing ulcer.  This intervention improved his general mobility but he still has discomfort when he walks barefoot on a hard surface.    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 Recently, he sought additional assessment of his orthopedic status.  He received a recommendation for custom orthotics to accommodate his right great toe discomfort. His orthopedic provider also recommended that further surgical intervention could be considered if orthotics were not satisfactory in improvement of symptoms.    He has a 1 cm lift in his left shoe to facilitate balance but notes that this really doesn't do much. He also notes that the bulky quality of his foot and degree of discomfort have limited his activity and he favors his left leg. He has morning pain and stiffness and decreased range of motion in his ankle. This improves through the day with improved mobility and comfort. He reports that the lower part of his leg does tend to swell over the course of the day with obvious presence of compression from socks, for example.    Vascular system has been previously " assessed by ultrasonography based on his recollection and not found to be abnormal. He thinks he also had MRI testing when he was young. He has had an echocardiogram done relatively recently that showed only mitral valve prolapse.     Dr. Goddard indicated that he is not able to run because of leg and knee discomfort, but that he exercises regularly, primarily by writing his bike. He can do this comfortably.    Currently, he primarily wears a single size shoe but reports that when he was young, his mother split shoe pairs to have the shoes fit better.    He was initially seen by our genetic counselor who assisted in initiating some recommended testing. Genetic tests for Levy Wiedemann methylation studies and [employer] sequencing for WT1, PTEN, AKT1,and PIK3CA were done and were all negative.    Past Medical History  Past Medical History:   Diagnosis Date     Hypertrophy of bone of lower leg     see details in problem list   Beyond the surgical interventions undertaken for treatment of his right lower extremity, he has never had any significant medical issues.  He hasn't had surgery for procedures other than those related to his right leg.    Review of Systems:  Constitional: negative  Eyes: negative - normal vision; Wears glasses  Ears/Nose/Throat: negative - normal hearing  Respiratory: negative  Cardiovascular: negative; normal echocardiogram  Gastrointestinal: negative  Genitourinary: negative  Hematologic/Lymphatic: negative  Allergy/Immunologic: negative - no drug allergies  Musculoskeletal: See HPI  Endocrine: negative  Integument: negative  Neurologic: negative  Psychiatric: takes clonapin for sleep initiation    Remainder of 10-point review of systems is complete and negative.    Personal History  Family History:  I reviewed the family history. Significant information included  The observation that no other family member had any similar features. Dr. 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 His parents are both alive and well; his mother had thyroid cancer in her middle years. The family is of French/English background. Consanguinity was denied.     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 He is in a stable long-term relationship. Non-smoker.  Current insurance status commercial/private.     I have reviewed Manuel s past medical history, family history, social history, medications and allergies as documented in the electronic medical record.  There were no additional findings except as noted.    Medications:  Current Outpatient Prescriptions   Medication Sig Dispense Refill     Multiple Vitamins-Minerals (MULTIVITAMIN ADULT PO)        clonazePAM (KLONOPIN) 1 MG tablet Take 1 tablet (1 mg) by mouth nightly as needed for anxiety 30 tablet 2     Allergies:  No Known Allergies    Physical Examination:  Blood pressure 108/76, pulse 103, height 5' 11.25\" (181 cm), weight 151 lb 14.4 oz (68.9 kg), head circumference 55.5 cm (21.85\").  Constitutional: This was a well-developed, well nourished male who responded appropriately to all requests during the examination.    Head and Neck:  He had hair of normal texture and distribution and his head was proportionate in appearance.  The face was symmetric and did not have dysmorphic features.   Eyes:  The pupils were equal, round, and reacted to light.   The conjunctivae were clear.   Ears:  His ears were normal in architecture and placement.   Nose: The nose was clear.    Mouth and Throat: normal shape and configuration, throat not examined    Respiratory: The chest was clear to auscultation and had a symmetric appearance.    Cardiovascular:  On examination of the heart, the rhythm was regular and there was no murmur.  The peripheral pulses were normal and symmetric.    Gastrointestinal: deferred  : I deferred a  examination. "   Neurologic: The neurologic exam was normal, with normal cranial nerves, normal deep tendon reflexes, normal sensation, and a normal gait. He had normal tone.   Integument: The skin was normal with no rashes or unusual pigmentation.  Most notably, I did not see obvious evidence of vascular markings. However, the right lower segment was warmer to the touch than the left one. The dentition was normal. The nails were normal in architecture On his hands.  He had normal hand dermatoglyphics. The left foot was normal in character. The right foot had marked overgrowth of the right great toe and the nail had been removed.  Musculoskeletal:  The lower segment of the right leg was diffusely enlarged. The enlargement did not extend much above the knee although the knee itself was considerably bulkier on the right than the left.  The most notable findings are present in the right leg lower segment. The name is diffusely enlarged and somewhat boggy in character. The right lower segment is diffusely longer and bulkier than the left. The right great toe is markedly enlarged compared to the other toes which are minimally larger than those on the left.  Though there is some diffuse swelling, it does not appreciably pitch on short exposure. There was a compression ring at the location of the top of his sock. The maximal circumference of the right calf was 36.5 cm. Maximal circumference of the left calf was 35 cm. Measuring 15 cm above the medial tibial tubercle, the right thigh was 40 cm and the left side was also 40 cm.  The maximal foot circumference was 27 cm on the right and 23.5 cm on the left.    Results of laboratory studies and imaging reviewed at this visit.  Results for orders placed or performed in visit on 08/15/17   XR Foot RT G/E 3 vw    Narrative    Exam: 3 views of the right foot dated 8/15/2017.    COMPARISON: 8/25/2015.    CLINICAL HISTORY: Pain.    FINDINGS: AP, oblique, and lateral views of the right foot  was  obtained. Marked osteophytic spurring at the talonavicular joint along  the dorsal aspect as well as along the distal aspect of the anterior  tibia. Degenerative changes at the posterior subtalar joint. Bony  fragment along the anterior aspect of the ankle joint. Marked soft  tissue swelling overlying the great toe. Marked osteoarthrosis at the  metatarsophalangeal joint of the right great toe with marked bony  proliferation and presumed at least partial fusion. There appears to  be fusion at multiple tarsometatarsal joints.      Impression    IMPRESSION:  1. Marked soft tissue swelling overlying the right great toe as well,  with unchanged marked bony proliferation at the metatarsophalangeal  joint with unchanged fusion at multiple tarsometatarsal joints.  2. Lewis spurring along the dorsal aspect of the right midfoot and  hindfoot with degenerative changes in the posterior subtalar joint.    MD VELASQUEZ WALSH M.D.  Professor and Director   Division of Genetics and Metabolism  Department of Pediatrics  [facility]    Routed to family in [facility]  Also to Jailene Ivory Fernando

## 2017-09-19 NOTE — NURSING NOTE
"Chief Complaint   Patient presents with     Consult     New patient here for 'Hypertrophy of lower leg'     /76 (BP Location: Right arm, Patient Position: Chair)  Pulse 103  Ht 5' 11.25\" (181 cm)  Wt 151 lb 14.4 oz (68.9 kg)  HC 55.5 cm (21.85\")  BMI 21.04 kg/m2    Sydni Vides LPN    "

## 2017-09-19 NOTE — LETTER
9/19/2017      RE: Manuel Goddard  [address]     [facility] [address]       Presenting Information: Manuel was seen for a return visit in Genetics Clinic on 9/19/2017. He has a history of hemihypertophy and has had Next Generation Sequencing of several genes associated with overgrowth syndromes. The following genes were tested: WT1, PTEN,PIK3CA, AKT1. No mutations were identified; a deletion/duplication study confirmed the presence of two copies of each of these genes. Alcides is here today for a follow up with Dr. Johanna Giron and would like to consider additional genetic testing.     Family History:  A three generation pedigree was obtained previously and is scanned into the EMR.  There are no changes to the family history.    Discussion:  It has been recommended by Dr. Giron that two additional genes, NSD1 and AKT2 be added to Alcides's Next Generation Sequencing panel. He is agreeable with this plan. We will request prior authorization from his [employer] plan. Testing will not be initiated until we have approval from [employer].    Establishing a genetic diagnosis for this patient is important not only for appropriate medical management, but also for accurate reproductive risk counseling. Alcides is eager to know if this is a condition that could be passed on in the family.    Consent was obtained for the additional testing. Blood was not drawn today, as we have DNA in the lab from his previous testing. Results should take 4-8 weeks to complete and I will call the patient directly.       Plan:  1. Additional testing for the following 2 genes, NSD1 and AKT2, is recommended. This is diagnostic testing to determine the cause of this patient's hemihypertrophy, to guide his medical management, and to determine the risk for recurrence in future offspring.  2.  Contact information was provided to the family and additional questions or concerns were denied.    Trini Brown, PAT  Certified Genetic  Counselor  [phone]    Approximate Time Spent in Consultation: 20    CC: No Letter        Trini Brown,

## 2017-09-19 NOTE — MR AVS SNAPSHOT
After Visit Summary   9/19/2017    Manuel Goddard    MRN: [id number]           Patient Information     Date Of Birth          1985        Visit Information        Provider Department      9/19/2017 8:00 AM Trini Brown GC Peds Genetics        Today's Diagnoses     Lymphedema of right lower extremity    -  1    Hypertrophy of bone of lower leg           Follow-ups after your visit        Your next 10 appointments already scheduled     Sep 29, 2017  8:30 AM CDT   (Arrive by 8:15 AM)   Lymphedema Treatment with Dixie Cam PT   [facility] ([facility] [place])    [address] [address] [phone]              Who to contact     Please call your clinic at [phone] to:    Ask questions about your health    Make or cancel appointments    Discuss your medicines    Learn about your test results    Speak to your doctor   If you have compliments or concerns about an experience at your clinic, or if you wish to file a complaint, please contact [facility] Patient Relations at [phone] or email us at [email or web address]         Additional Information About Your Visit        MyChart Information     [employer] gives you secure access to your electronic health record. If you see a primary care provider, you can also send messages to your care team and make appointments. If you have questions, please call your primary care clinic.  If you do not have a primary care provider, please call [phone] and they will assist you.      [employer]) is an electronic gateway that provides easy, online access to your medical records. With [employer]), you can request a clinic appointment, read your test results, renew a prescription or communicate with your care team.     To access your existing account, please contact your [facility] Clinic or call [phone] for  assistance.        Care EveryWhere ID     This is your Care EveryWhere ID. This could be used by other organizations to access your [facility] medical records  [id number]         Blood Pressure from Last 3 Encounters:   09/19/17 108/76   09/08/17 128/82   02/26/16 117/68    Weight from Last 3 Encounters:   09/19/17 151 lb 14.4 oz (68.9 kg)   09/08/17 151 lb 14.4 oz (68.9 kg)   08/15/17 145 lb (65.8 kg)              Today, you had the following     No orders found for display       Primary Care Provider Office Phone # Fax #    Jailene Elaine Ivory [phone]       [address] [place] [id number]        Equal Access to Services     JODY MARX : Hadii kenn richard hadasho Soomaali, waaxda luqadaha, qaybta kaalmada adeegyada, mil villafana hayconi guzman . So [facility] [phone].    ATENCIÓN: Si habla español, tiene a delvalle disposición servicios gratuitos de asistencia lingüística. Llame al [phone].    We comply with applicable federal civil rights laws and [place] laws. We do not discriminate on the basis of race, color, national origin, age, disability sex, sexual orientation or gender identity.            Thank you!     Thank you for choosing [facility]  for your care. Our goal is always to provide you with excellent care. Hearing back from our patients is one way we can continue to improve our services. Please take a few minutes to complete the written survey that you may receive in the mail after your visit with us. Thank you!             Your Updated Medication List - Protect others around you: Learn how to safely use, store and throw away your medicines at www.disposemymeds.org.          This list is accurate as of: 9/19/17 11:59 PM.  Always use your most recent med list.                   Brand Name Dispense Instructions for use Diagnosis    clonazePAM 1 MG tablet    klonoPIN    30 tablet    Take 1 tablet (1 mg) by mouth nightly as needed for anxiety     Insomnia, unspecified type       MULTIVITAMIN ADULT PO

## 2017-09-19 NOTE — MR AVS SNAPSHOT
After Visit Summary   9/19/2017    Manuel Goddard    MRN: [id number]           Patient Information     Date Of Birth          1985        Visit Information        Provider Department      9/19/2017 7:45 AM Lexi Giron MD Peds Genetics        Today's Diagnoses     Hypertrophy of bone of lower leg    -  1    Lymphedema of right lower extremity          Care Instructions    Genetics  [facility]     Call if any general or medical questions arise - contact our nurse coordinator at [phone]    If you had genetic testing, you can contact the genetic counselor who saw you if you have further questions.    Trini Brown [phone]    Scheduling: [phone]    We will plan imaging by Xray of your legs in a standing position.  Will contact you when orders for this are placed.    Update of genetic tests to be undertaken          Follow-ups after your visit        Additional Services     GENETIC COUNSELING SERVICES       GENETICS COUNSELING SERVICES ASSOCIATED WITH THIS ENCOUNTER.            PHYSICAL THERAPY REFERRAL       *This therapy referral will be filtered to a centralized scheduling office at [facility] and the patient will receive a call to schedule an appointment at a [facility] location most convenient for them. *     [facility] provides Physical Therapy evaluation and treatment and many specialty services across the [facility] system.  If requesting a specialty program, please choose from the list below.    If you have not heard from the scheduling office within 2 business days, please call [phone] for all locations, with the exception of [place], please call [phone].  Treatment: Evaluation & Treatment  Special Instructions/Modalities: evaluate; measure for custom thigh length garment R leg  Special Programs: [facility]    Please be aware that coverage of these services is subject  "to the terms and limitations of your health insurance plan.  Call member services at your health plan with any benefit or coverage questions.      **Note to Provider:  If you are referring outside of [place] for the therapy appointment, please list the name of the location in the \"special instructions\" above, print the referral and give to the patient to schedule the appointment.                  Follow-up notes from your care team     Return as testing progresses.      Who to contact     Please call your clinic at [phone] to:    Ask questions about your health    Make or cancel appointments    Discuss your medicines    Learn about your test results    Speak to your doctor   If you have compliments or concerns about an experience at your clinic, or if you wish to file a complaint, please contact [facility] Patient Relations at [phone] or email us at [email or web address]         Additional Information About Your Visit        [employer] Information     [employer] gives you secure access to your electronic health record. If you see a primary care provider, you can also send messages to your care team and make appointments. If you have questions, please call your primary care clinic.  If you do not have a primary care provider, please call [phone] and they will assist you.      [employer] is an electronic gateway that provides easy, online access to your medical records. With [employer], you can request a clinic appointment, read your test results, renew a prescription or communicate with your care team.     To access your existing account, please contact your [facility] or call [phone] for assistance.        Care EveryWhere ID     This is your Care EveryWhere ID. This could be used by other organizations to access your [place] medical records  [id number]        Your Vitals Were     Pulse Height Head Circumference BMI (Body Mass Index)    " "      103 5' 11.25\" (181 cm) 55.5 cm (21.85\") 21.04 kg/m2         Blood Pressure from Last 3 Encounters:   09/19/17 108/76   09/08/17 128/82   02/26/16 117/68    Weight from Last 3 Encounters:   09/19/17 151 lb 14.4 oz (68.9 kg)   09/08/17 151 lb 14.4 oz (68.9 kg)   08/15/17 145 lb (65.8 kg)              We Performed the Following     GENETIC COUNSELING SERVICES     PHYSICAL THERAPY REFERRAL        Primary Care Provider Office Phone # Fax #    Jailene Elaine Ivory [phone]       [address] [place] [address] [address] [place] [id number]        Equal Access to Services     JODY MARX : Hadii aad ku hadasho Soomaali, waaxda luqadaha, qaybta kaalmada adeegyada, waxchristian duranin shellie guzman . So [facility] [phone].    ATENCIÓN: Si habla español, tiene a delvalle disposición servicios gratuitos de asistencia lingüística. [facility] [phone].    We comply with applicable federal civil rights laws and [place] laws. We do not discriminate on the basis of race, color, national origin, age, disability sex, sexual orientation or gender identity.            Thank you!     Thank you for choosing [facility]  for your care. Our goal is always to provide you with excellent care. Hearing back from our patients is one way we can continue to improve our services. Please take a few minutes to complete the written survey that you may receive in the mail after your visit with us. Thank you!             Your Updated Medication List - Protect others around you: Learn how to safely use, store and throw away your medicines at www.disposemymeds.org.          This list is accurate as of: 9/19/17  9:31 AM.  Always use your most recent med list.                   Brand Name Dispense Instructions for use Diagnosis    clonazePAM 1 MG tablet    klonoPIN    30 tablet    Take 1 tablet (1 mg) by mouth nightly as needed for anxiety    Insomnia, unspecified type       MULTIVITAMIN ADULT PO             "

## 2017-09-19 NOTE — LETTER
2017      RE: Manuel Goddard  [address]     [facility] [address]     GENETICS CLINIC   Consultation    Name:  Manuel Goddard  :   1985  MRN:   [id number]  Primary Provider: Jailene Ivory  Referring Provider: Jailene Ivory    Date of service: Sep 19, 2017    Reason for visit:  A consultation in the Genetics Clinic was requested by Jailene Ivory for Manuel, a 31 year old man, for evaluation of right lower leg enlargement with hypertrophy of the right great toe.     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 This is resulted in marked hypertrophy of the right first toe and generalized overgrowth of the bones of the lower segment. He has considerable degenerative joint progression in the right foot with some bone spurring. I don't see evidence that there is substantial vascular involvement in this structural physical difference.    Initial genetic testing did not yield an underlying diagnosis. On physical assessment, his findings are not suggestive of a systemic syndromic diagnosis but rather more likely represent a segmental or somatic pattern of altered gene expression. Somatic or mosaic distribution of abnormalities of genes in the PI3K/mTOR pathway are associated with overgrowth syndromes. At the time that we did his original testing, we chose the most commonly associated genes. We will now return to examine some additional genes in this pathway,    Plan:    We will test some additional single genes for possible contribution to Dr. Goddard's phenotype. 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    We also discussed the option of whole exome sequencing, but this would only be optimally recommended if a skin biopsy was undertaken.    Ordered at this visit:  Orders Placed This Encounter   Procedures     GENETIC COUNSELING SERVICES     PHYSICAL THERAPY REFERRAL   Physical therapy referral is for lymphedema management with compression garment.    Genetic counseling [place] with Trini Brown MS, [facility] [place] regarding additional genetic testing. We will add testing for AKT2 and NSD1.    Will review optimal imaging of leg lengths with radiology, then update Dr. Goddard about plans for imaging. It may be appropriate to consider a more substantial equalization of his limb size by orthopedic appliance. This will depend on the degree of length discrepancy. I'm concerned that if we don't do a better job of balancing his leg lengths that ultimately he will have hip and back pain related to alteration of his gait.    I will briefly review his case with our rheumatology team. I don't know if there might be interventions that would provide better comfort given the degree of bony degenerative change in his x-rays.     Return to Genetics Clinic for follow-up as needed pending results of testing.      -----  History of Present Illness:  Patient Active Problem List    Diagnosis Date Noted     Hypertrophy of bone of lower leg 06/17/2015     Priority: Medium     Since birth, hypertrophy of R leg/foot.  Mostly hypertrophy of fatty tissue -- ?benign lipoma; however, required mult debulking surgeries and stunding of the growth plate.        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 In the first six months of life, asymmetry emerged, particularly his right great toe. With time, the right leg and foot grew faster and longer. His first surgery was for debulking of his toe and removal of toenail. He had regular follow-up examinations, primarily at the [place]  [facility] for monitoring of his growth. Ultimately, he had a procedure to stunt growth in the affected limb. He also had procedures to debulk what he thought was characterized as \"fatty tissue\" but this was not fully successful and the bulkiness of his knee recurred. These initial procedures took place sometime in his teen years. Subsequently, he had debulking of the right ankle. This was most helpful in assisting in accommodating shoes. He also had a second debulking procedure of the right knee sometime in high school.     During his college years, ankle bone spurs reduced his range of motion and were removed. He indicates that with time, these have slowly recurred. About two years ago, he had some bone in his right great toe removed because of pain; the procedure also did some debulking of the right great toe.  At that time, he had a painful, poorly healing ulcer.  This intervention improved his general mobility but he still has discomfort when he walks barefoot on a hard surface.    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 Recently, he sought additional assessment of his orthopedic status.  He received a recommendation for custom orthotics to accommodate his right great toe discomfort. His orthopedic provider also recommended that further surgical intervention could be considered if orthotics were not satisfactory in improvement of symptoms.    He has a 1 cm lift in his left shoe to facilitate balance but notes that this really doesn't do much. He also notes that the bulky quality of his foot and degree of discomfort have limited his activity and he favors his left leg. He has morning pain and stiffness and decreased range of motion in his ankle. This improves through the day with improved mobility and comfort. He reports that the lower part of his leg does tend to swell over the course of the day with obvious " presence of compression from socks, for example.    Vascular system has been previously assessed by ultrasonography based on his recollection and not found to be abnormal. He thinks he also had MRI testing when he was young. He has had an echocardiogram done relatively recently that showed only mitral valve prolapse.     Dr. Goddard indicated that he is not able to run because of leg and knee discomfort, but that he exercises regularly, primarily by writing his bike. He can do this comfortably.    Currently, he primarily wears a single size shoe but reports that when he was young, his mother split shoe pairs to have the shoes fit better.    He was initially seen by our genetic counselor who assisted in initiating some recommended testing. Genetic tests for Levy Wiedemann methylation studies and [employer] sequencing for WT1, PTEN, AKT1,and PIK3CA were done and were all negative.    Past Medical History  Past Medical History:   Diagnosis Date     Hypertrophy of bone of lower leg     see details in problem list   Beyond the surgical interventions undertaken for treatment of his right lower extremity, he has never had any significant medical issues.  He hasn't had surgery for procedures other than those related to his right leg.    Review of Systems:  Constitional: negative  Eyes: negative - normal vision; Wears glasses  Ears/Nose/Throat: negative - normal hearing  Respiratory: negative  Cardiovascular: negative; normal echocardiogram  Gastrointestinal: negative  Genitourinary: negative  Hematologic/Lymphatic: negative  Allergy/Immunologic: negative - no drug allergies  Musculoskeletal: See HPI  Endocrine: negative  Integument: negative  Neurologic: negative  Psychiatric: takes clonapin for sleep initiation    Remainder of 10-point review of systems is complete and negative.    Personal History  Family History:  I reviewed the family history. Significant information included  The observation that no other family  "member had any similar features. 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 His parents are both alive and well; his mother had thyroid cancer in her middle years. The family is of French/English background. Consanguinity was denied.     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 He is in a stable long-term relationship. Non-smoker.  Current insurance status commercial/private.     I have reviewed Manuel s past medical history, family history, social history, medications and allergies as documented in the electronic medical record.  There were no additional findings except as noted.    Medications:  Current Outpatient Prescriptions   Medication Sig Dispense Refill     Multiple Vitamins-Minerals (MULTIVITAMIN ADULT PO)        clonazePAM (KLONOPIN) 1 MG tablet Take 1 tablet (1 mg) by mouth nightly as needed for anxiety 30 tablet 2     Allergies:  No Known Allergies    Physical Examination:  Blood pressure 108/76, pulse 103, height 5' 11.25\" (181 cm), weight 151 lb 14.4 oz (68.9 kg), head circumference 55.5 cm (21.85\").  Constitutional: This was a well-developed, well nourished male who responded appropriately to all requests during the examination.    Head and Neck:  He had hair of normal texture and distribution and his head was proportionate in appearance.  The face was symmetric and did not have dysmorphic features.   Eyes:  The pupils were equal, round, and reacted to light.   The conjunctivae were clear.   Ears:  His ears were normal in architecture and placement.   Nose: The nose was clear.    Mouth and Throat: normal shape and configuration, throat not examined    Respiratory: The chest was clear to auscultation and had a symmetric appearance.    Cardiovascular:  On examination of the heart, the rhythm was regular and there was no murmur.  The peripheral pulses were " normal and symmetric.    Gastrointestinal: deferred  : I deferred a  examination.   Neurologic: The neurologic exam was normal, with normal cranial nerves, normal deep tendon reflexes, normal sensation, and a normal gait. He had normal tone.   Integument: The skin was normal with no rashes or unusual pigmentation.  Most notably, I did not see obvious evidence of vascular markings. However, the right lower segment was warmer to the touch than the left one. The dentition was normal. The nails were normal in architecture On his hands.  He had normal hand dermatoglyphics. The left foot was normal in character. The right foot had marked overgrowth of the right great toe and the nail had been removed.  Musculoskeletal:  The lower segment of the right leg was diffusely enlarged. The enlargement did not extend much above the knee although the knee itself was considerably bulkier on the right than the left.  The most notable findings are present in the right leg lower segment. The name is diffusely enlarged and somewhat boggy in character. The right lower segment is diffusely longer and bulkier than the left. The right great toe is markedly enlarged compared to the other toes which are minimally larger than those on the left.  Though there is some diffuse swelling, it does not appreciably pitch on short exposure. There was a compression ring at the location of the top of his sock. The maximal circumference of the right calf was 36.5 cm. Maximal circumference of the left calf was 35 cm. Measuring 15 cm above the medial tibial tubercle, the right thigh was 40 cm and the left side was also 40 cm.  The maximal foot circumference was 27 cm on the right and 23.5 cm on the left.    Results of laboratory studies and imaging reviewed at this visit.  Results for orders placed or performed in visit on 08/15/17   XR Foot RT G/E 3 vw    Narrative    Exam: 3 views of the right foot dated 8/15/2017.    COMPARISON:  8/25/2015.    CLINICAL HISTORY: Pain.    FINDINGS: AP, oblique, and lateral views of the right foot was  obtained. Marked osteophytic spurring at the talonavicular joint along  the dorsal aspect as well as along the distal aspect of the anterior  tibia. Degenerative changes at the posterior subtalar joint. Bony  fragment along the anterior aspect of the ankle joint. Marked soft  tissue swelling overlying the great toe. Marked osteoarthrosis at the  metatarsophalangeal joint of the right great toe with marked bony  proliferation and presumed at least partial fusion. There appears to  be fusion at multiple tarsometatarsal joints.      Impression    IMPRESSION:  1. Marked soft tissue swelling overlying the right great toe as well,  with unchanged marked bony proliferation at the metatarsophalangeal  joint with unchanged fusion at multiple tarsometatarsal joints.  2. Lewis spurring along the dorsal aspect of the right midfoot and  hindfoot with degenerative changes in the posterior subtalar joint.    MD VELASQUEZ WALSH M.D.  Professor and Director   Division of Genetics and Metabolism  Department of Pediatrics  [facility]    Routed to [facility] in [facility]  Also to Jailene Ivory [place]

## 2017-09-19 NOTE — LETTER
Date:September 29, 2017      Provider requested that no letter be sent. Do not send.       [facility] Health Information

## 2017-09-20 NOTE — PROGRESS NOTES
I agree with the PFSH and ROS as completed by the MS.  The remainder of the encounter was performed by me and scribed by the MS.  The scribed note accurately reflects my personal services and the decisions made by me.    Jailene Ivory MD

## 2017-09-27 ENCOUNTER — HOSPITAL ENCOUNTER (OUTPATIENT)
Dept: PHYSICAL THERAPY | Facility: CLINIC | Age: 32
Setting detail: THERAPIES SERIES
End: 2017-09-27
Attending: MEDICAL GENETICS
Payer: COMMERCIAL

## 2017-09-27 DIAGNOSIS — I89.0 LYMPHEDEMA OF RIGHT LOWER EXTREMITY: Primary | ICD-10-CM

## 2017-09-27 PROCEDURE — 97162 PT EVAL MOD COMPLEX 30 MIN: CPT | Mod: GP,ZF | Performed by: PHYSICAL THERAPIST

## 2017-09-27 PROCEDURE — 97140 MANUAL THERAPY 1/> REGIONS: CPT | Mod: GP,ZF | Performed by: PHYSICAL THERAPIST

## 2017-09-27 PROCEDURE — 97535 SELF CARE MNGMENT TRAINING: CPT | Mod: GP,ZF | Performed by: PHYSICAL THERAPIST

## 2017-09-27 PROCEDURE — 40000449 ZZHC STATISTIC PT VISIT, LYMPHEDEMA: Mod: ZF | Performed by: PHYSICAL THERAPIST

## 2017-09-27 NOTE — PROGRESS NOTES
09/27/17 1300   Rehab Discipline   Discipline PT   Type of Visit   Type of visit Initial Edema Evaluation       present No   General Information   Start of care 09/27/17   Referring physician Dr. Lexi Giron   Orders Evaluate and treat to include bandages and garments   Order date 09/19/17   Medical diagnosis Hypertrophy R LE, Lymphedema   Edema onset [birth year]   Affected body parts RLE   Edema etiology Congenital   Edema etiology comments Body and soft tissue growth to help with movement and function.     Pertinent history of current problem (PT: include personal factors and/or comorbidities that impact the POC; OT: include additional occupational profile info) Pt reports history of R LE problems, multiple surgeries over the years to shave bone and debulk soft tissue.  His condition does not have an name but he identifies symptoms closely with Cloves disease.   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   Fall Screening   Fall screen completed by PT   Per patient, fall 2 or more times in past year? No   Per patient, fall with injury in past year? No   Is patient a fall risk? No   System Outcome Measures   Outcome Measures Lymphedema   Lymphedema Life Impact Scale (score range 0-72). A higher score indicates greater impairment. 25  (37% impairment)   Subjective Report   Patient report of symptoms Worse during summer, sometimes more full at the end of the day.     Patient  / Family Goals   Patient / family goals statement See what I can do to make the leg feel better, it feels more stable with my knee sleeve but I may need something for the whole leg.   Pain   Patient currently in pain No   Pain comments ankle pain and knee pain at times when active   Cognitive Status   Orientation Orientation to person, place and time   Level of consciousness Alert   Follows commands and answers questions 100% of the time   Personal safety and judgement Intact   Memory Intact   Edema Exam / Assessment   Skin condition Intact   Skin condition comments Non-pitting to finger pressure but deep sock line at high ankle.  Pt with pillow of swelling on the anterior shin, great toe hypertrophied with bony excision, medial knee soft tissue swelling vs tissue proliferation   Scar Yes   Location R LE: ankle, knee, multiple scars   Stemmer sign Negative   Ulceration No   Girth Measurements   Girth Measurements Refer to separate girth measurement flowsheet   Volume LE   Right LE (mL) 5044.36   Left LE (mL) 3880.44   LE volume comparison RLE volume greater than LLE volume   % difference 30.0   Range of Motion   ROM comments ankle ROM limited by body hypertrophy and surgery, knee ROM functional   Strength   Strength comments Pt reports some decreased strength from pain and surgeries, functional   Posture   Posture comments R knee valgus   Activities of Daily Living   Activities of Daily Living independent   Bed Mobility   Bed mobility independent   Transfers   Transfers independent   Gait / Locomotion   Gait / Locomotion independent, not restricted   Sensory   Sensory perception comments Intact   Coordination   Coordination Gross motor coordination appropriate   Muscle Tone   Muscle tone No deficits were identified   Planned Edema Interventions   Planned edema interventions Manual lymph drainage;Gradient compression bandaging;Fit for compression garment;Exercises;Precautions to prevent infection /  exacerbation;Education;Manual therapy;ADL training;Skin care / precautions;Home management program development   Clinical Impression   Criteria for skilled therapeutic intervention met Yes   Therapy diagnosis Primary lymphedema   Influenced by the following impairments / conditions Stage 2;Primary Lymphedema   Functional limitations due to impairments / conditions Fit of clothing and thus ability to participate in activities (limited by footwear), pain at times, body image   Clinical Presentation Evolving/Changing   Clinical Presentation Rationale Pt has had multiple surgeries for this L LE hypertrophy which edema is just one symptom, limits clothing, mobility, leisure activity   Clinical Decision Making (Complexity) Moderate complexity   Treatment frequency 2 times / week  (4 weeks, 1 month follow up x2)   Treatment duration 100 days   Patient / family and/or staff in agreement with plan of care Yes   Risks and benefits of therapy have been explained Yes   Education Assessment   Preferred learning style Listening;Reading;Demonstration;Pictures / video   Barriers to learning No barriers   Goals   Edema Eval Goals 1;2;3   Goal 1   Goal identifier Home Program   Goal description Pt will be independent in Lymphedema home program including MLD, exercise, skin care to manage edema symptoms at home.   Target date 11/26/17   Goal 2   Goal identifier LLIS   Goal description Pt will have 8 point or more decreased score (<18) to demonstrates MICD in impact of edema on quality of life   Target date 01/04/18   Goal 3   Goal identifier Volume   Goal description Pt will properly use compression to decrease L LE Volume by 10% for better clothing and shoe fit   Target date 01/04/18   Total Evaluation Time   Total evaluation time 17

## 2017-09-28 NOTE — PROGRESS NOTES
Presenting Information: Manuel was seen for a return visit in Genetics Clinic on 9/19/2017. He has a history of hemihypertophy and has had Next Generation Sequencing of several genes associated with overgrowth syndromes. The following genes were tested: WT1, PTEN,PIK3CA, AKT1. No mutations were identified; a deletion/duplication study confirmed the presence of two copies of each of these genes. Alcides is here today for a follow up with Dr. Johanna Giron and would like to consider additional genetic testing.     Family History:  A three generation pedigree was obtained previously and is scanned into the EMR.  There are no changes to the family history.    Discussion:  It has been recommended by Dr. Giron that two additional genes, NSD1 and AKT2 be added to Alcides's Next Generation Sequencing panel. He is agreeable with this plan. We will request prior authorization from his [employer] plan. Testing will not be initiated until we have approval from [employer].    Establishing a genetic diagnosis for this patient is important not only for appropriate medical management, but also for accurate reproductive risk counseling. Alcides is eager to know if this is a condition that could be passed on in the family.    Consent was obtained for the additional testing. Blood was not drawn today, as we have DNA in the lab from his previous testing. Results should take 4-8 weeks to complete and I will call the patient directly.       Plan:  1. Additional testing for the following 2 genes, NSD1 and AKT2, is recommended. This is diagnostic testing to determine the cause of this patient's hemihypertrophy, to guide his medical management, and to determine the risk for recurrence in future offspring.  2.  Contact information was provided to the family and additional questions or concerns were denied.    Trini Brown   Certified Genetic Counselor  [phone]    Approximate Time Spent in Consultation: 20    CC: No Letter

## 2017-09-29 ENCOUNTER — HOSPITAL ENCOUNTER (OUTPATIENT)
Dept: PHYSICAL THERAPY | Facility: CLINIC | Age: 32
Setting detail: THERAPIES SERIES
End: 2017-09-29
Attending: MEDICAL GENETICS
Payer: COMMERCIAL

## 2017-09-29 PROCEDURE — 40000449 ZZHC STATISTIC PT VISIT, LYMPHEDEMA: Mod: ZF | Performed by: PHYSICAL THERAPIST

## 2017-09-29 PROCEDURE — 97140 MANUAL THERAPY 1/> REGIONS: CPT | Mod: GP,ZF | Performed by: PHYSICAL THERAPIST

## 2017-09-29 PROCEDURE — 97535 SELF CARE MNGMENT TRAINING: CPT | Mod: GP,ZF | Performed by: PHYSICAL THERAPIST

## 2017-10-04 ENCOUNTER — TELEPHONE (OUTPATIENT)
Dept: CONSULT | Facility: CLINIC | Age: 32
End: 2017-10-04

## 2017-10-04 NOTE — TELEPHONE ENCOUNTER
Notified Alcides that we received prior authorization approval valid from 9/19/17 to 12/31/17. Authorization number is [id number]. Alcides expressed understanding. Testing was previously initiated. We will call when results are available. Alcides had no further questions.    Gilma Guevara    Division of Genetics  [facility]  P: [phone]

## 2017-10-05 DIAGNOSIS — Q89.8 HEMIHYPERTROPHY OF LOWER LIMB: Primary | ICD-10-CM

## 2017-10-06 ENCOUNTER — MYC MEDICAL ADVICE (OUTPATIENT)
Dept: CONSULT | Facility: CLINIC | Age: 32
End: 2017-10-06

## 2017-10-10 DIAGNOSIS — M89.369: Primary | ICD-10-CM

## 2017-12-21 LAB — COPATH REPORT: NORMAL

## 2018-01-02 ENCOUNTER — TELEPHONE (OUTPATIENT)
Dept: CONSULT | Facility: CLINIC | Age: 33
End: 2018-01-02

## 2018-01-02 NOTE — TELEPHONE ENCOUNTER
Patient informed of normal/negative genetic test result. The report was released to [facility].    Trini Brown MS,[facility]  Certified Genetic Counselor  [email or web address]  [phone]

## 2018-01-05 LAB — COPATH REPORT: NORMAL

## 2018-01-26 NOTE — PROGRESS NOTES
Outpatient Physical Therapy Discharge Note     Patient: Manuel Goddard  : 1985    Beginning/End Dates of Reporting Period:  17-17    Referring Provider: Dr. Lexi Giron    Therapy Diagnosis: Lymphedem of LE     Client Self Report: I tried wrapping and I just can't do it, well I could, but I think getting a garment will be better    Goals:  Goal Identifier Home Program   Goal Description Pt will be independent in Lymphedema home program including MLD, exercise, skin care to manage edema symptoms at home.   Target Date 17   Date Met  17   Progress:     Goal Identifier LLIS   Goal Description Pt will have 8 point or more decreased score (<18) to demonstrates MICD in impact of edema on quality of life   Target Date 18   Date Met   (Not reassessed)   Progress:     Goal Identifier Volume   Goal Description Pt will properly use compression to decrease L LE Volume by 10% for better clothing and shoe fit   Target Date 18   Date Met   (Pt did not return call from PT.)   Progress:     Progress Toward Goals: Pt did well with home exercise and massage. He did not feel that he could bandage and still be able to wear a shoe and get to work. Pt elected to decrease therapy frequency and get a garment.  Order obtained and patient informed.  Pt was contacted a few time by Xochilt and stevenson WARD and asked to call if questions, concerns, or need to return to clinic. PT did not return call withing 2 months, will discharge at this time.    Plan:  Discharge from therapy.    Discharge:    Reason for Discharge: Patient chooses to discontinue therapy.  Patient has failed to schedule further appointments.    Equipment Issued: Home program    Discharge Plan: Continue home program, call CLT with questions, try garment

## 2018-03-14 ENCOUNTER — OFFICE VISIT (OUTPATIENT)
Dept: PSYCHIATRY | Facility: CLINIC | Age: 33
End: 2018-03-14
Attending: CLINICAL NURSE SPECIALIST
Payer: COMMERCIAL

## 2018-03-14 VITALS
BODY MASS INDEX: 20.75 KG/M2 | WEIGHT: 149.8 LBS | DIASTOLIC BLOOD PRESSURE: 72 MMHG | HEART RATE: 55 BPM | SYSTOLIC BLOOD PRESSURE: 116 MMHG

## 2018-03-14 DIAGNOSIS — F40.10 SOCIAL ANXIETY DISORDER: ICD-10-CM

## 2018-03-14 DIAGNOSIS — F41.9 ANXIETY: Primary | ICD-10-CM

## 2018-03-14 PROCEDURE — G0463 HOSPITAL OUTPT CLINIC VISIT: HCPCS | Mod: ZF

## 2018-03-14 RX ORDER — ESCITALOPRAM OXALATE 10 MG/1
10 TABLET ORAL DAILY
Qty: 30 TABLET | Refills: 1 | Status: SHIPPED | OUTPATIENT
Start: 2018-03-14 | End: 2018-04-11

## 2018-03-14 ASSESSMENT — PAIN SCALES - GENERAL: PAINLEVEL: NO PAIN (0)

## 2018-03-14 NOTE — NURSING NOTE
Chief Complaint   Patient presents with     Eval/Assessment     Anxiety      Reviewed Allergies, Medications, Pharmacy, Smoking Status, and Pain Level  Administered Abuse Screening Questions   Obtained Weight, Blood Pressure, Heart Rate

## 2018-03-14 NOTE — PROGRESS NOTES
"  Outpatient Psychiatry Diagnostic Assessment     Provider:  STEWART Whiting 3/14/2018 11:01 AM  Patient Identification: Manuel Goddard  YOB: 1985   MRN: [id number]  Manuel Goddard is a 32 year old male  who presents for a 60 minute Diagnostic Assessment.   The patient s chief complaint is  anxiety\"  Patient was referred by Jailene Ivory.  Manuel is interviewed alone.  History of Present Illness      Manuel has had anxiety for most of his life.  Seems more difficult with current job. Affects abiilty to speak in groups, make decisions, focusing, giving presentations These have been occurring but have worsened.   Uses klonopin infrequent when things are difficult might take once a week. Does help but makes him drowsy.   Has been on klonopin for about 5 years to help with sleep but doesn't take it daily.   Psychiatric Review of Systems:  Depression per PHQ more than 1/2 days fatigue. Nearly everyday anhedonia, feeling depressed, sleep disturbance, feelings of failure, concentration problems.    Anxiety : ? MAYRA score of 34  Krysten: none   Psychosis:  none  Gambling:none  Eating disorder: none  The patient reports no current chemical use.    Chemical use history is described in detail in a separate section.    Reports no suicidal thoughts.    Reports no violent ideation.    Current treatment resources include none.   Other support workers include none.    Sleep assessment: problems falling asleep and waking during the night. Happening a lot. Gets about 4 hours a night recently. If not stressed gets about 8 hours  Nutrition:appetite has been good. Takes a MVI, no special dietary neededs. No food allergies  Past Psychiatric History      Previous providers:none  Past medication trials include klonopin.   He has had no psychiatric hospitalizations.    Past psychotherapy trials include individual in Ph.D program and here. Not helpful..   ECT none  Has made no suicide attempts.  "   Has no history of self-injurious behavior.    no history of violent behavior.    Chemical Use History      CAGE -AID completed and scanned to chart Score of 0/4.  Denies frequent use or abuse of alcohol   Cannabis denies  Other substance abuse:  Stimulants: denies, Hallucinogens: denies, Opiates: denies  Caffeine tea    Nicotine: none  The patient has not had treatment for chemical dependence.     Past Medical/Surgical History      The patient s primary care provider is as listed in the medical record.    Allergies are listed in the medical record.   Medications prescribed by other providers are as listed in the medical record.   The patient reports current physical symptoms including pain and stiffness in leg.    The patient s chronic medical conditions are as listed in the medical record.    He reports no history of head injury.   He reports no history of loss of consciousness.   He reports no history of seizures.   He reports no history of of other neurological concerns.      Patient Active Problem List   Diagnosis     Hypertrophy of bone of lower leg     Lymphedema of right lower extremity     Current Outpatient Prescriptions Ordered in [facility]   Medication Sig Dispense Refill     Multiple Vitamins-Minerals (MULTIVITAMIN ADULT PO)        clonazePAM (KLONOPIN) 1 MG tablet Take 1 tablet (1 mg) by mouth nightly as needed for anxiety 30 tablet 2     order for DME Compression Garments for the R LE:  Custom 20-30mmHg thigh high stocking, Toe cap for hallux swelling  Full leg night garment to include foot and toes 1 each 0     No current [facility]-ordered facility-administered medications on file.          Family History      The patient reports a family mental health treatment of in extended maternal side bipolar and depression.  Family medical history includes   Family History   Problem Relation Age of Onset     CEREBROVASCULAR DISEASE Maternal Grandfather      late 70's     Thyroid Cancer Mother      DIABETES Paternal  "Aunt      ?DMI     DIABETES Cousin      ?DMI         Social History       The patient was raised on the [facility].   He has one older and one younger 1/2 brother brother and one older sister.  Parents  when he was 1 or 1 years. Has not been close to family. Just doesn't have much in common with them.  Trauma history includes denies emotional, physical or sexual abuse.   The patient has a significant other and has no children.    The patient s social support system includes male partner.  He  lives with partner.  Has limited support in ps.   He  completed  high school and did not participate in special education classes. Post high school education includes Ph.D in genetics in 2012. 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   He is currently employed as genetic.    He has not had involvement with the legal system.   He has not served in the .   The patient reports the following spiritual and/or cultural history: none affecting medical care. .  Finances are stable and basic needs are met.  Review of Systems     Pertinent items are noted in HPI.    Results      Pertinent findings on review include: Review of records Epic with relevant information reported in the HPI.    VS: /72  Pulse 55  Wt 67.9 kg (149 lb 12.8 oz)  BMI 20.75 kg/m2    Mental Status Exam     Appearance:  Casually dressed and Well groomed  Behavior/relationship to examiner/demeanor:  Cooperative  Motor activity/EPS:  Normal  Gait:  Normal  Speech rate:  Normal  Speech volume:  Normal  Speech articulation:  Normal  Speech coherence:  Normal  Speech spontaneity:  Normal  Mood (subjective report):  \"okay\"  Affect (objective appearance):  Appropriate/mood-congruent and Anxious/Nervous  Thought Process (Associations):  Goal directed  Thought process (Rate):  Normal  Thought content:  no overt psychosis, denies suicidal ideation, intent or thoughts, patient denies auditory and visual hallucinations, patient does not appear to " be responding to internal stimuli, delusions denies, No violent ideation and No homicidal ideation  Abnormal Perception:  None  Sensorium:  Alert, Oriented to person, Oriented to place, Oriented to date/time and Oriented to situation  Attention/Concentration:  Normal  Memory:  Immediate recall intact, Short-term memory intact and Long-term memory intact  Language:  Intact  Fund of Knowledge/Intelligence:  Average  Abstraction:  Normal  Insight:  Good  Judgment:  Good      Assessment & Plan     Assessment:   Manuel Goddard is a 32 year old male  who presents for treatment of anxiety.    Diagnosis  PEARL, Social anxiety.     Plan:    Medication: Start Lexapro 10mg daily   OTC Recommendations: none  Lab Orders:  none  Referrals: none  Release of Information: none  Future Treatment Considerations:  Per symptoms  Return for Follow Up: 4 weeks.     The plan of care was reviewed and discussed with Manuel Goddard.  This included risks,benefits, efficacy, dose, side effects and length of treatment. he agreed with the plan and verbalized understanding.  Patient was given phone number and contact information for the clinic and encouraged to call if he has concerns prior to the follow up appointment.The patient understands to call 911 or come to the nearest ED if life threatening or urgent symptoms present. The patient understands the risks of using street drugs or alcohol.    Anabel Salazar APRN CNS  3/14/2018

## 2018-03-14 NOTE — MR AVS SNAPSHOT
After Visit Summary   3/14/2018    Manuel Goddard    MRN: [id number]           Patient Information     Date Of Birth          1985        Visit Information        Provider Department      3/14/2018 10:45 AM Anabel Salazar APRN CNS Psychiatry Clinic        Today's Diagnoses     Anxiety    -  1    Social anxiety disorder           Follow-ups after your visit        Your next 10 appointments already scheduled     Apr 11, 2018  3:45 PM CDT   Adult Med Follow UP with STEWART Whiting CNS   Psychiatry Clinic ([email or web address] Clinics)    [address]  [address] [id number] [phone]              Who to contact     Please call your clinic at [phone] to:    Ask questions about your health    Make or cancel appointments    Discuss your medicines    Learn about your test results    Speak to your doctor            Additional Information About Your Visit        MyChart Information     [employer] gives you secure access to your electronic health record. If you see a primary care provider, you can also send messages to your care team and make appointments. If you have questions, please call your primary care clinic.  If you do not have a primary care provider, please call [phone] and they will assist you.      [employer] is an electronic gateway that provides easy, online access to your medical records. With [employer], you can request a clinic appointment, read your test results, renew a prescription or communicate with your care team.     To access your existing account, please contact your [facility] or call [phone] for assistance.        Care EveryWhere ID     This is your Care EveryWhere ID. This could be used by other organizations to access your [facility] medical records  [id number]        Your Vitals Were     Pulse BMI (Body Mass Index)                55 20.75 kg/m2           Blood Pressure from  Last 3 Encounters:   03/14/18 116/72   09/19/17 108/76   09/08/17 128/82    Weight from Last 3 Encounters:   03/14/18 67.9 kg (149 lb 12.8 oz)   09/19/17 68.9 kg (151 lb 14.4 oz)   09/08/17 68.9 kg (151 lb 14.4 oz)              Today, you had the following     No orders found for display         Today's Medication Changes          These changes are accurate as of 3/14/18 11:59 PM.  If you have any questions, ask your nurse or doctor.               Start taking these medicines.        Dose/Directions    escitalopram 10 MG tablet   Commonly known as:  LEXAPRO   Used for:  Anxiety, Social anxiety disorder   Started by:  Anabel Salazar APRN CNS        Dose:  10 mg   Take 1 tablet (10 mg) by mouth daily   Quantity:  30 tablet   Refills:  1            Where to get your medicines      These medications were sent to [place] [id number] [facility] [phone]  [address] [facility] [address] [address]    Hours:  TRANSPLANT PHONE NUMBER [phone] Phone:  [phone]     escitalopram 10 MG tablet                Primary Care Provider Office Phone # Fax #    Jailene Elaine Ivory [phone]       [address] [place] [address] [address]  [facility] [address]        Equal Access to Services     JODY MARX AH: Hadii kenn richard hadasho Somiguel, waaxda luqadaha, qaybta kaalmada adeegyada, mil sommer. So [facility] [phone].    ATENCIÓN: Si habla español, tiene a delvalle disposición servicios gratuitos de asistencia lingüística. Claudine al [phone].    We comply with applicable federal civil rights laws and [place] laws. We do not discriminate on the basis of race, color, national origin, age, disability, sex, sexual orientation, or gender identity.            Thank you!     Thank you for choosing PSYCHIATRY CLINIC  for your care. Our goal is always to provide you with excellent care. Hearing back from our patients is one way we can continue to  improve our services. Please take a few minutes to complete the written survey that you may receive in the mail after your visit with us. Thank you!             Your Updated Medication List - Protect others around you: Learn how to safely use, store and throw away your medicines at www.disposemymeds.org.          This list is accurate as of 3/14/18 11:59 PM.  Always use your most recent med list.                   Brand Name Dispense Instructions for use Diagnosis    clonazePAM 1 MG tablet    klonoPIN    30 tablet    Take 1 tablet (1 mg) by mouth nightly as needed for anxiety    Insomnia, unspecified type       escitalopram 10 MG tablet    LEXAPRO    30 tablet    Take 1 tablet (10 mg) by mouth daily    Anxiety, Social anxiety disorder       MULTIVITAMIN ADULT PO           order for DME     1 each    Compression Garments for the R LE: Custom 20-30mmHg thigh high stocking, Toe cap for hallux swelling Full leg night garment to include foot and toes    Lymphedema of right lower extremity

## 2018-04-11 ENCOUNTER — OFFICE VISIT (OUTPATIENT)
Dept: PSYCHIATRY | Facility: CLINIC | Age: 33
End: 2018-04-11
Attending: CLINICAL NURSE SPECIALIST
Payer: COMMERCIAL

## 2018-04-11 VITALS
HEART RATE: 55 BPM | DIASTOLIC BLOOD PRESSURE: 72 MMHG | BODY MASS INDEX: 20.86 KG/M2 | SYSTOLIC BLOOD PRESSURE: 112 MMHG | WEIGHT: 150.6 LBS

## 2018-04-11 DIAGNOSIS — F40.10 SOCIAL ANXIETY DISORDER: ICD-10-CM

## 2018-04-11 DIAGNOSIS — F41.9 ANXIETY: ICD-10-CM

## 2018-04-11 PROCEDURE — G0463 HOSPITAL OUTPT CLINIC VISIT: HCPCS | Mod: ZF

## 2018-04-11 RX ORDER — ESCITALOPRAM OXALATE 10 MG/1
10 TABLET ORAL DAILY
Qty: 90 TABLET | Refills: 0 | Status: SHIPPED | OUTPATIENT
Start: 2018-04-11 | End: 2018-06-15

## 2018-04-11 ASSESSMENT — PAIN SCALES - GENERAL: PAINLEVEL: NO PAIN (0)

## 2018-04-11 NOTE — MR AVS SNAPSHOT
After Visit Summary   4/11/2018    Manuel Goddard    MRN: [id number]           Patient Information     Date Of Birth          1985        Visit Information        Provider Department      4/11/2018 3:45 PM Anabel Salazar APRN [facility]        Today's Diagnoses     Anxiety        Social anxiety disorder           Follow-ups after your visit        Follow-up notes from your care team     Return in about 8 weeks (around 6/6/2018).      Your next 10 appointments already scheduled     Jun 06, 2018  8:45 AM CDT   Adult Med Follow UP with STEWART Whiting CNS   Psychiatry Clinic ([facility])    [address]  [address] [address] [phone]              Who to contact     Please call your clinic at [phone] to:    Ask questions about your health    Make or cancel appointments    Discuss your medicines    Learn about your test results    Speak to your doctor            Additional Information About Your Visit        MyChart Information     [email or web address] gives you secure access to your electronic health record. If you see a primary care provider, you can also send messages to your care team and make appointments. If you have questions, please call your primary care clinic.  If you do not have a primary care provider, please call [phone] and they will assist you.      [email or web address] is an electronic gateway that provides easy, online access to your medical records. With [email or web address], you can request a clinic appointment, read your test results, renew a prescription or communicate with your care team.     To access your existing account, please contact your [facility] or call [phone] for assistance.        Care EveryWhere ID     This is your Care EveryWhere ID. This could be used by other organizations to access your [facility] medical records  [id number]        Your Vitals Were      Pulse BMI (Body Mass Index)                55 20.86 kg/m2           Blood Pressure from Last 3 Encounters:   04/11/18 112/72   03/14/18 116/72   09/19/17 108/76    Weight from Last 3 Encounters:   04/11/18 68.3 kg (150 lb 9.6 oz)   03/14/18 67.9 kg (149 lb 12.8 oz)   09/19/17 68.9 kg (151 lb 14.4 oz)              Today, you had the following     No orders found for display         Today's Medication Changes          These changes are accurate as of 4/11/18 11:59 PM.  If you have any questions, ask your nurse or doctor.               These medicines have changed or have updated prescriptions.        Dose/Directions    escitalopram 10 MG tablet   Commonly known as:  LEXAPRO   This may have changed:  additional instructions   Used for:  Anxiety, Social anxiety disorder   Changed by:  Anabel Salazar APRN CNS        Dose:  10 mg   Take 1 tablet (10 mg) by mouth daily Please fill this instead of the 30 day refill sent on 3/14/18.   Quantity:  90 tablet   Refills:  0            Where to get your medicines      These medications were sent to [place], [place] - [address] [facility]  [address] [facility] [address]     Phone:  [phone]     escitalopram 10 MG tablet                Primary Care Provider Office Phone # Fax #    Jailene Elaine Ivory [phone]       [address] [facility] [address]  [facility] [address]        Equal Access to Services     [facility]EL AH: Hadii kenn Serrano, waaxda lurajeshadaha, qaybta kaalmada sheritayamilton, waxay broderick sommer. So [facility] [phone].    ATENCIÓN: Si habla español, tiene a delvalle disposición servicios gratuitos de asistencia lingüística. Llame al [phone].    We comply with applicable federal civil rights laws and [place] laws. We do not discriminate on the basis of race, color, national origin, age, disability, sex, sexual orientation, or gender identity.            Thank you!     Thank you for  choosing PSYCHIATRY CLINIC  for your care. Our goal is always to provide you with excellent care. Hearing back from our patients is one way we can continue to improve our services. Please take a few minutes to complete the written survey that you may receive in the mail after your visit with us. Thank you!             Your Updated Medication List - Protect others around you: Learn how to safely use, store and throw away your medicines at www.disposemymeds.org.          This list is accurate as of 4/11/18 11:59 PM.  Always use your most recent med list.                   Brand Name Dispense Instructions for use Diagnosis    clonazePAM 1 MG tablet    klonoPIN    30 tablet    Take 1 tablet (1 mg) by mouth nightly as needed for anxiety    Insomnia, unspecified type       escitalopram 10 MG tablet    LEXAPRO    90 tablet    Take 1 tablet (10 mg) by mouth daily Please fill this instead of the 30 day refill sent on 3/14/18.    Anxiety, Social anxiety disorder       MULTIVITAMIN ADULT PO           order for DME     1 each    Compression Garments for the R LE: Custom 20-30mmHg thigh high stocking, Toe cap for hallux swelling Full leg night garment to include foot and toes    Lymphedema of right lower extremity

## 2018-04-11 NOTE — PROGRESS NOTES
Outpatient Psychiatry Progress Note     Provider: STEWART Whiting CNS  Date: 2018  Service:  Medication follow up with counseling.   Patient Identification: Manuel Goddard  : 1985   MRN: [id number]    Manuel Goddard is a 32 year old year old male who presents for ongoing psychiatric care.  Manuel Goddard was last seen in clinic on 3/14/18.   At that time,   Assessment & Plan      Assessment:   Manuel Goddard is a 32 year old male  who presents for treatment of anxiety.     Diagnosis  PEARL, Social anxiety.      Plan:    Medication: Start Lexapro 10mg daily   OTC Recommendations: none  Lab Orders:  none  Referrals: none  Release of Information: none  Future Treatment Considerations:  Per symptoms  Return for Follow Up: 4 weeks      ____________________________________________________________________________________________________________________________________________    2018  Today Manuel reports he did notice improvement after one week, Not as anxious.  Has not needed Klonopin  Side effects of medication include: none reported  Psychiatric Review of Systems:  Depression: In the last 2 weeks per PHQ 9 several days concentration problems. More than 1/2 days sleep disturbance.  Anxiety : mild, feeling more comfortable in work setting, changing the way he thinks about situations  Krysten n/a   Psychosis  n/a.   ADHD n/a    Review of Medical Systems:  Sleep: moderate which he reports is normal for him - insomnia.  Not affecting functioning during the day  Energy: stable  Concentration: stable  Appetite: stable  GI Concerns: none  Cardiac concerns: none  Neurological concerns: none  Other medical concerns: no new concerns  Current Substance Use:  Alcohol:denies frequent use or abuse  Other drugs:denies  Caffeine:not assessed  Nicotine: none  Past Medical History:   Past Medical History:   Diagnosis Date     Hypertrophy of bone of lower leg     see details in problem list  "    Patient Active Problem List   Diagnosis     Hypertrophy of bone of lower leg     Lymphedema of right lower extremity       Allergies: No Known Allergies       Current Medications     Current Outpatient Prescriptions Ordered in [facility]   Medication Sig Dispense Refill     escitalopram (LEXAPRO) 10 MG tablet Take 1 tablet (10 mg) by mouth daily 30 tablet 1     order for DME Compression Garments for the R LE:  Custom 20-30mmHg thigh high stocking, Toe cap for hallux swelling  Full leg night garment to include foot and toes 1 each 0     Multiple Vitamins-Minerals (MULTIVITAMIN ADULT PO)        clonazePAM (KLONOPIN) 1 MG tablet Take 1 tablet (1 mg) by mouth nightly as needed for anxiety 30 tablet 2     No current Epic-ordered facility-administered medications on file.         Mental Status Exam     Appearance:  Casually dressed and Well groomed  Behavior/relationship to examiner/demeanor:  Cooperative  Orientation: Oriented to person, place, time and situation  Psychomotor: normal form  Speech Rate:  Normal  Speech Spontaneity:  Normal  Mood:  \"good\"  Affect:  Appropriate/mood-congruent  Thought Process (Associations):  Goal directed  Thought Content:  no overt psychosis, denies suicidal ideation, intent or thoughts and patient does not appear to be responding to internal stimuli  Abnormal Perception:  None  Attention/Concentration:  Normal  Insight:  Good  Judgment:  Good      Results     Vital signs: /72  Pulse 55  Wt 68.3 kg (150 lb 9.6 oz)  BMI 20.86 kg/m2    Laboratory Data:  no new data    Assessment & Plan      Manuel Goddard is seen today for follow up and reports he has noticed improvement since being on Lexapro. Discussed that he most likely will notice further improvement over the next few months but if instead has return of symptoms he should return sooner.    Diagnosis  PEARL    Plan:  Medication: Continue Lexapro 10mg. Klonopin 1mg PRN but not daily use  OTC Recommendations: none  Lab Orders:  " none  Referrals: none  Release of Information: none  Future Treatment Considerations:per symptoms  Return for Follow Up:8 weeks   The risks, benefits, alternatives and side effects have been discussed and are understood by the patient. The patient understands the risks of using street drugs or alcohol. There are no medical contraindications, the patient agrees to treatment, and has the capacity to do so. The patient understands to call 911 or come to the nearest ED if life threatening or urgent symptoms present.  In addition time was spent counseling the patient and/or coordinating care regarding review of social and occupational functioning.  In addition patient was counseled on health and wellness practices to augment medication treatment of symptoms. See note for details.    Anabel Salazar, APRN CNS 4/11/2018

## 2018-06-15 ENCOUNTER — OFFICE VISIT (OUTPATIENT)
Dept: PSYCHIATRY | Facility: CLINIC | Age: 33
End: 2018-06-15
Attending: CLINICAL NURSE SPECIALIST
Payer: COMMERCIAL

## 2018-06-15 VITALS
BODY MASS INDEX: 20.58 KG/M2 | SYSTOLIC BLOOD PRESSURE: 103 MMHG | HEART RATE: 51 BPM | WEIGHT: 148.6 LBS | DIASTOLIC BLOOD PRESSURE: 63 MMHG

## 2018-06-15 DIAGNOSIS — F41.9 ANXIETY: ICD-10-CM

## 2018-06-15 DIAGNOSIS — F40.10 SOCIAL ANXIETY DISORDER: ICD-10-CM

## 2018-06-15 PROCEDURE — G0463 HOSPITAL OUTPT CLINIC VISIT: HCPCS | Mod: ZF

## 2018-06-15 RX ORDER — ESCITALOPRAM OXALATE 10 MG/1
10 TABLET ORAL DAILY
Qty: 90 TABLET | Refills: 1 | Status: SHIPPED | OUTPATIENT
Start: 2018-06-15 | End: 2018-06-29

## 2018-06-15 ASSESSMENT — PAIN SCALES - GENERAL: PAINLEVEL: NO PAIN (0)

## 2018-06-15 NOTE — NURSING NOTE
Chief Complaint   Patient presents with     Recheck Medication     Anxiety      Provider completed Pharmacy and Medications review.Nataly Esquivel LPN

## 2018-06-15 NOTE — PROGRESS NOTES
Outpatient Psychiatry Progress Note     Provider: STEWART Whiting CNS  Date: 6/15/2018  Service:  Medication follow up with counseling.   Patient Identification: Manuel Goddard  : 1985   MRN: [id number]    Manuel Goddard is a 32 year old year old male who presents for ongoing psychiatric care.  Manuel Goddard was last seen in clinic on 18.   At that time,   Assessment & Plan       Manuel Goddard is seen today for follow up and reports he has noticed improvement since being on Lexapro. Discussed that he most likely will notice further improvement over the next few months but if instead has return of symptoms he should return sooner.     Diagnosis  PEARL     Plan:  Medication: Continue Lexapro 10mg. Klonopin 1mg PRN but not daily use  OTC Recommendations: none  Lab Orders:  none  Referrals: none  Release of Information: none  Future Treatment Considerations:per symptoms  Return for Follow Up:8 weeks      ____________________________________________________________________________________________________________________________________________    06/15/2018  Today Manuel reports he did try increase in Lexapro but at 20mg was very fatigued and decreased to 15mg but continued fatigue and not sleeping which lead to the fatigue.  He changed jobs about 6 weeks ago and is less stressed in this environment. 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  Side effects of medication include: none that he is aware  Psychiatric Review of Systems:  Depression: In the last 2 weeks per PHQ 9 several days feelings of failure, concentration problems. More than 1/2 days fatigue.  Nearly everyday sleep disturbance.  Anxiety : some improvement with medication and job change  Krysten na   Psychosis  na.   ADHD na    Review of Medical Systems:  Sleep: every once in awhile insomnia. Once in a while uses 0.5mg Klonopin if insomnia extends into early morning  Energy:  "mild  Concentration: mild  Appetite: stable  GI Concerns: none  Cardiac concerns: none  Neurological concerns: none  Other medical concerns: no new concerns  Current Substance Use:  Alcohol:denies frequent use or abuse  Other drugs:denies  Caffeine:tea  Nicotine: none  Past Medical History:   Past Medical History:   Diagnosis Date     Hypertrophy of bone of lower leg     see details in problem list     Patient Active Problem List   Diagnosis     Hypertrophy of bone of lower leg     Lymphedema of right lower extremity     Anxiety     Social anxiety disorder       Allergies: No Known Allergies       Current Medications     Current Outpatient Prescriptions Ordered in [facility]   Medication Sig Dispense Refill     clonazePAM (KLONOPIN) 1 MG tablet Take 1 tablet (1 mg) by mouth nightly as needed for anxiety 30 tablet 2     escitalopram (LEXAPRO) 10 MG tablet Take 1 tablet (10 mg) by mouth daily Please fill this instead of the 30 day refill sent on 3/14/18. 90 tablet 0     Multiple Vitamins-Minerals (MULTIVITAMIN ADULT PO)        order for DME Compression Garments for the R LE:  Custom 20-30mmHg thigh high stocking, Toe cap for hallux swelling  Full leg night garment to include foot and toes 1 each 0     No current [facility]-ordered facility-administered medications on file.         Mental Status Exam     Appearance:  Casually dressed and Well groomed  Behavior/relationship to examiner/demeanor:  Cooperative  Orientation: Oriented to person, place, time and situation  Psychomotor: normal form  Speech Rate:  Normal  Speech Spontaneity:  Normal  Mood:  \"good\"  Affect:  Appropriate/mood-congruent  Thought Process (Associations):  Goal directed  Thought Content:  no overt psychosis, denies suicidal ideation, intent or thoughts and patient does not appear to be responding to internal stimuli  Abnormal Perception:  None  Attention/Concentration:  Normal  Insight:  Good  Judgment:  Good      Results     Vital signs: /63  Pulse 51  " Wt 67.4 kg (148 lb 9.6 oz)  BMI 20.58 kg/m2    Laboratory Data:  no new data reviewed    Assessment & Plan      Manuel Goddard is seen today for follow up and reports he tried Lexapro at 20mg but had fatigue.  Has changed jobs which has reduced stress.  He does feel that 10mg Lexapro is helpful and would like to continue this dose.    Diagnosis  PEARL    Plan:  Medication: Continue Lexapro 10mg  OTC Recommendations: none  Lab Orders:  none  Referrals: none  Release of Information: sergio  Future Treatment Considerations:per smptoms could consider Lexapro at 15mgn  Return for Follow Up:6 months   The risks, benefits, alternatives and side effects have been discussed and are understood by the patient. The patient understands the risks of using street drugs or alcohol. There are no medical contraindications, the patient agrees to treatment, and has the capacity to do so. The patient understands to call 911 or come to the nearest ED if life threatening or urgent symptoms present.  In addition time was spent counseling the patient and/or coordinating care regarding review of social and occupational functioning.  In addition patient was counseled on health and wellness practices to augment medication treatment of symptoms. See note for details.    Anabel Salazar, APRN CNS 6/15/2018

## 2018-06-15 NOTE — MR AVS SNAPSHOT
After Visit Summary   6/15/2018    Manuel Goddard    MRN: [id number]           Patient Information     Date Of Birth          1985        Visit Information        Provider Department      6/15/2018 7:45 AM Anabel Salazar APRN CNS [facility]        Today's Diagnoses     Anxiety        Social anxiety disorder           Follow-ups after your visit        Follow-up notes from your care team     Return in about 3 months (around 9/15/2018).      Your next 10 appointments already scheduled     Nov 13, 2018  8:45 AM [facility]   Adult Med Follow UP with STEWART Whiting CNS   Psychiatry Clinic ([facility])    [address]  [address] [address] [phone]              Who to contact     Please call your clinic at [phone] to:    Ask questions about your health    Make or cancel appointments    Discuss your medicines    Learn about your test results    Speak to your doctor            Additional Information About Your Visit        MyChart Information     [employer] gives you secure access to your electronic health record. If you see a primary care provider, you can also send messages to your care team and make appointments. If you have questions, please call your primary care clinic.  If you do not have a primary care provider, please call [phone] and they will assist you.      [employer] is an electronic gateway that provides easy, online access to your medical records. With [employer], you can request a clinic appointment, read your test results, renew a prescription or communicate with your care team.     To access your existing account, please contact your [facility] or call [phone] for assistance.        Care EveryWhere ID     This is your Care EveryWhere ID. This could be used by other organizations to access your [facility] medical records  [id number]        Your Vitals Were      Pulse BMI (Body Mass Index)                51 20.58 kg/m2           Blood Pressure from Last 3 Encounters:   06/15/18 103/63   04/11/18 112/72   03/14/18 116/72    Weight from Last 3 Encounters:   06/15/18 67.4 kg (148 lb 9.6 oz)   04/11/18 68.3 kg (150 lb 9.6 oz)   03/14/18 67.9 kg (149 lb 12.8 oz)              Today, you had the following     No orders found for display         Where to get your medicines      These medications were sent to [facility] - [place], [place] - [address] [facility]  [address] [facility] [address]     Phone:  [phone]     escitalopram 10 MG tablet          Primary Care Provider Office Phone # Fax #    Jailene Elaine Ivory [phone]       [address] [facility] [address]  [facility] [address]        Equal Access to Services     DELL [employer] GroupJOSE ANGEL : Hadii kenn richard hadasho Somiguel, waaxda luqadaha, qaybta kaalmada adeegyada, mil guzman . So [facility] [phone].    ATENCIÓN: Si habla español, tiene a delvalle disposición servicios gratuitos de asistencia lingüística. Claudine al [phone].    We comply with applicable federal civil rights laws and [place] laws. We do not discriminate on the basis of race, color, national origin, age, disability, sex, sexual orientation, or gender identity.            Thank you!     Thank you for choosing [facility]  for your care. Our goal is always to provide you with excellent care. Hearing back from our patients is one way we can continue to improve our services. Please take a few minutes to complete the written survey that you may receive in the mail after your visit with us. Thank you!             Your Updated Medication List - Protect others around you: Learn how to safely use, store and throw away your medicines at www.disposemymeds.org.          This list is accurate as of 6/15/18 11:59 PM.  Always use your most recent med list.                   Brand Name Dispense Instructions for use  Diagnosis    clonazePAM 1 MG tablet    klonoPIN    30 tablet    Take 1 tablet (1 mg) by mouth nightly as needed for anxiety    Insomnia, unspecified type       escitalopram 10 MG tablet    LEXAPRO    90 tablet    Take 1 tablet (10 mg) by mouth daily Please fill this instead of the 30 day refill sent on 3/14/18.    Anxiety, Social anxiety disorder       MULTIVITAMIN ADULT PO           order for DME     1 each    Compression Garments for the R LE: Custom 20-30mmHg thigh high stocking, Toe cap for hallux swelling Full leg night garment to include foot and toes    Lymphedema of right lower extremity

## 2018-06-28 ENCOUNTER — MYC MEDICAL ADVICE (OUTPATIENT)
Dept: PSYCHIATRY | Facility: CLINIC | Age: 33
End: 2018-06-28

## 2018-06-28 DIAGNOSIS — F40.10 SOCIAL ANXIETY DISORDER: ICD-10-CM

## 2018-06-28 DIAGNOSIS — F41.9 ANXIETY: ICD-10-CM

## 2018-06-29 RX ORDER — ESCITALOPRAM OXALATE 10 MG/1
10 TABLET ORAL DAILY
Qty: 90 TABLET | Refills: 1 | Status: SHIPPED | OUTPATIENT
Start: 2018-06-29 | End: 2019-09-13

## 2018-06-29 NOTE — TELEPHONE ENCOUNTER
Last seen: 6/15/18  RTC: 3-6 months  Cancel: none  No-show: none  Next appt: 11/13/18    At appt on 6/15 a prescription was sent by VIPIN Noonan APRN for Lexapro 10 mg daily #90, 1 RF to [place] pharmacy.  Per pt request this is resubmitted to new mail order pharmacy [facility].  Will notify provider.

## 2019-06-13 NOTE — TELEPHONE ENCOUNTER
RECORDS RECEIVED FROM: history of idiopathic hemihyperplasia of right leg; in past week, entire leg painfully stiff and noticed painful soft tissue mass at knee. request evaluation and referral for further workup.   DATE RECEIVED: 6.14.19   NOTES STATUS DETAILS   OFFICE NOTE from referring provider N/A    OFFICE NOTE from other specialist Internal 1.2.18 Kevin, telephone  9.19.17 Trini Brown,   9.19.17 Dr. Giron  9.8.17 Dr. Ivory  8.15.17 Dr. Correa  2.28.17 Selma Baldwin,   11.17.15 Dr. Correa  10.28.15 Ortho RN  9.25.15 Dr. Ivory  8.25.15 Dr. Correa  8.14.15 Dr. Nguyen  6.17.15 Dr. Ivory   DISCHARGE SUMMARY from hospital N/A    DISCHARGE REPORT from the ER N/A    OPERATIVE REPORT Internal 10.2.15 Dr. Correa   MEDICATION LIST Internal    IMPLANT RECORD/STICKER N/A    LABS     CBC/DIFF Internal 8.13.15, 6.17.15   CULTURES N/A    INJECTIONS DONE IN RADIOLOGY N/A    MRI N/A    CT SCAN N/A    XRAYS (IMAGES & REPORTS) Internal 10.16.17, 8.15.17, 8.25.15   TUMOR     PATHOLOGY  Slides & report N/A

## 2019-06-14 ENCOUNTER — ANCILLARY PROCEDURE (OUTPATIENT)
Dept: GENERAL RADIOLOGY | Facility: CLINIC | Age: 34
End: 2019-06-14
Payer: COMMERCIAL

## 2019-06-14 ENCOUNTER — PRE VISIT (OUTPATIENT)
Dept: ORTHOPEDICS | Facility: CLINIC | Age: 34
End: 2019-06-14

## 2019-06-14 ENCOUNTER — OFFICE VISIT (OUTPATIENT)
Dept: ORTHOPEDICS | Facility: CLINIC | Age: 34
End: 2019-06-14
Payer: COMMERCIAL

## 2019-06-14 VITALS
WEIGHT: 151.9 LBS | HEART RATE: 82 BPM | SYSTOLIC BLOOD PRESSURE: 112 MMHG | HEIGHT: 71 IN | DIASTOLIC BLOOD PRESSURE: 70 MMHG | BODY MASS INDEX: 21.27 KG/M2

## 2019-06-14 DIAGNOSIS — M89.369: Primary | ICD-10-CM

## 2019-06-14 DIAGNOSIS — M89.369: ICD-10-CM

## 2019-06-14 DIAGNOSIS — R22.41 LOWER LEG MASS, RIGHT: ICD-10-CM

## 2019-06-14 LAB — RADIOLOGIST FLAGS: NORMAL

## 2019-06-14 ASSESSMENT — MIFFLIN-ST. JEOR: SCORE: 1646.14

## 2019-06-14 NOTE — PROGRESS NOTES
Subjective:   Manuel Goddard is a 33 year old male who presents with right leg pain. The patient reports new mass in his posteromedial right lower leg.     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  He does not have a definitive genetic diagnosis.  He has had some surgeries to the right lower extremity because of bone spurs and arthritis at the ankle.  His 2017 6 foot standing film demonstrates moderate to severe degenerative changes at the right ankle with moderate degenerative changes at the right knee and mild to moderate degenerative changes at the right hip.  He has no significant degenerative changes in the left lower extremity.    He states that he has found a new tender mass at the lower postdural medial aspect of the knee joint.  This is actually present in the proximal third of the tib-fib.  He is concerned about this because he is at risk for neoplastic processes because of his overgrowth syndrome.  He denies any constitutional symptoms.    Background:   Date of injury: None  Duration of symptoms: 2-3 days worsening pain and new mass  Mechanism of Injury: Insidious Onset; Unknown   Intensity: 0/10 at rest, 3/10 with activity   Aggravating factors: Palpation   Relieving Factors: Nothing- tried elevation  Prior Evaluation: None    PAST MEDICAL, SOCIAL, SURGICAL AND FAMILY HISTORY: He  has a past medical history of Hypertrophy of bone of lower leg.  He  has a past surgical history that includes leg surgeries.  His family history includes Cerebrovascular Disease in his maternal grandfather; Diabetes in his cousin and paternal aunt; Thyroid Cancer in his mother.  He reports that he has never smoked. He has never used smokeless tobacco. He reports that he drinks about 1.5 - 3.0 oz of alcohol per week. He reports that he does not use drugs.    ALLERGIES: He has No Known Allergies.    CURRENT MEDICATIONS: He has a current medication list which includes the following  "prescription(s): clonazepam, escitalopram, multiple vitamins-minerals, and order for dme.     REVIEW OF SYSTEMS: 12 point review of systems is negative except as noted above.     Exam:   /70 (BP Location: Right arm, Patient Position: Sitting, Cuff Size: Adult Regular)   Pulse 82   Ht 1.803 m (5' 11\")   Wt 68.9 kg (151 lb 14.4 oz)   BMI 21.19 kg/m        CONSTITUTIONAL: healthy, alert and no distress  HEAD: Normocephalic. No masses, lesions, tenderness or abnormalities  NEUROLOGIC: Non-focal  PSYCHIATRIC: affect normal/bright and mentation appears normal.    MUSCULOSKELETAL:   Right lower extremity: The knee has a moderate effusion.  He is tender along the medial joint line in a diffuse fashion.  He has discomfort with knee flexion.  He has bursal swelling on the posterior medial aspect likely consistent with a larger Baker's cyst.  At the lower junction of this there is a tender firm nodularity.  It has a consistency of scar tissue and not lymphadenopathy.  There is no overlying warmth or erythema.       Assessment/Plan:   (M89.369) Hypertrophy of bone of lower leg  (primary encounter diagnosis)  Comment: We will proceed with MR imaging to ascertain the etiology of this tender swelling.  I am also going to repeat 6 foot standing films so that we can evaluate his lower extremity degenerative changes.  He is now discussing the possibility of considering a mid femoral amputation of the lower extremity and would like to consider discussions around this.  Plan: MR Tibia Fibula Lower Leg Right wo Contrast, XR        Six Foot Standing Extremities            (R22.41) Lower leg mass, right  Comment: Imaging as above.  Plan: MR Tibia Fibula Lower Leg Right wo Contrast              "

## 2019-06-14 NOTE — LETTER
6/14/2019      RE: Manuel Goddard  [address]   [address]  [facility] [address]        Subjective:   Manuel Goddard is a 33 year old male who presents with right leg pain. The patient reports new mass in his posteromedial right lower leg.     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  He does not have a definitive genetic diagnosis.  He has had some surgeries to the right lower extremity because of bone spurs and arthritis at the ankle.  His 2017 6 foot standing film demonstrates moderate to severe degenerative changes at the right ankle with moderate degenerative changes at the right knee and mild to moderate degenerative changes at the right hip.  He has no significant degenerative changes in the left lower extremity.    He states that he has found a new tender mass at the lower postdural medial aspect of the knee joint.  This is actually present in the proximal third of the tib-fib.  He is concerned about this because he is at risk for neoplastic processes because of his overgrowth syndrome.  He denies any constitutional symptoms.    Background:   Date of injury: None  Duration of symptoms: 2-3 days worsening pain and new mass  Mechanism of Injury: Insidious Onset; Unknown   Intensity: 0/10 at rest, 3/10 with activity   Aggravating factors: Palpation   Relieving Factors: Nothing- tried elevation  Prior Evaluation: None    PAST MEDICAL, SOCIAL, SURGICAL AND FAMILY HISTORY: He  has a past medical history of Hypertrophy of bone of lower leg.  He  has a past surgical history that includes leg surgeries.  His family history includes Cerebrovascular Disease in his maternal grandfather; Diabetes in his cousin and paternal aunt; Thyroid Cancer in his mother.  He reports that he has never smoked. He has never used smokeless tobacco. He reports that he drinks about 1.5 - 3.0 oz of alcohol per week. He reports that he does not use drugs.    ALLERGIES: He has No Known  "Allergies.    CURRENT MEDICATIONS: He has a current medication list which includes the following prescription(s): clonazepam, escitalopram, multiple vitamins-minerals, and order for dme.     REVIEW OF SYSTEMS: 12 point review of systems is negative except as noted above.     Exam:   /70 (BP Location: Right arm, Patient Position: Sitting, Cuff Size: Adult Regular)   Pulse 82   Ht 1.803 m (5' 11\")   Wt 68.9 kg (151 lb 14.4 oz)   BMI 21.19 kg/m         CONSTITUTIONAL: healthy, alert and no distress  HEAD: Normocephalic. No masses, lesions, tenderness or abnormalities  NEUROLOGIC: Non-focal  PSYCHIATRIC: affect normal/bright and mentation appears normal.    MUSCULOSKELETAL:   Right lower extremity: The knee has a moderate effusion.  He is tender along the medial joint line in a diffuse fashion.  He has discomfort with knee flexion.  He has bursal swelling on the posterior medial aspect likely consistent with a larger Baker's cyst.  At the lower junction of this there is a tender firm nodularity.  It has a consistency of scar tissue and not lymphadenopathy.  There is no overlying warmth or erythema.       Assessment/Plan:   (M89.369) Hypertrophy of bone of lower leg  (primary encounter diagnosis)  Comment: We will proceed with MR imaging to ascertain the etiology of this tender swelling.  I am also going to repeat 6 foot standing films so that we can evaluate his lower extremity degenerative changes.  He is now discussing the possibility of considering a mid femoral amputation of the lower extremity and would like to consider discussions around this.  Plan: MR Tibia Fibula Lower Leg Right wo Contrast, XR        Six Foot Standing Extremities            (R22.41) Lower leg mass, right  Comment: Imaging as above.  Plan: MR Tibia Fibula Lower Leg Right wo Contrast                Noble Carpio MD    "

## 2019-06-17 ENCOUNTER — TELEPHONE (OUTPATIENT)
Dept: ORTHOPEDICS | Facility: CLINIC | Age: 34
End: 2019-06-17

## 2019-06-17 NOTE — TELEPHONE ENCOUNTER
Incidental Findings: 3. Soft tissue fullness of the right lower extremity at the level of  the tibia/fibula. This is more pronounced since prior. Correlate  clinically if cross-sectional images are required for further  evaluation.        [Consider Follow Up: Soft tissue fullness of the right lower extremity  at the level of the tibia/fibula. This is more pronounced since prior.  Correlate clinically if cross-sectional images are required for  further evaluation.]

## 2019-06-18 ENCOUNTER — ANCILLARY PROCEDURE (OUTPATIENT)
Dept: MRI IMAGING | Facility: CLINIC | Age: 34
End: 2019-06-18
Attending: FAMILY MEDICINE
Payer: COMMERCIAL

## 2019-06-18 DIAGNOSIS — M89.369: ICD-10-CM

## 2019-06-18 DIAGNOSIS — R22.41 LOWER LEG MASS, RIGHT: ICD-10-CM

## 2019-06-20 ENCOUNTER — OFFICE VISIT (OUTPATIENT)
Dept: ORTHOPEDICS | Facility: CLINIC | Age: 34
End: 2019-06-20
Payer: COMMERCIAL

## 2019-06-20 DIAGNOSIS — R22.41 LOWER LEG MASS, RIGHT: Primary | ICD-10-CM

## 2019-06-20 NOTE — PROGRESS NOTES
Subjective:   Manuel Goddard is a 33 year old male who follows up with right leg pain to review his xray and MRI results.  He has had some increased size in his right lower extremity.  That appears to primarily be subcutaneous and fatty tissue based on the MRI appearance and could be affected by fluid.  There is no note of a mass at the proximal aspect of the medial collateral ligament.  Manuel is not symptomatic at this site.  He has had prior right knee surgeries.  We have reviewed that in detail.    Date of injury: None  Date last seen: 6/14/2019  Following Therapeutic Plan: Yes, imaging      PAST MEDICAL, SOCIAL, SURGICAL AND FAMILY HISTORY: He  has a past medical history of Hypertrophy of bone of lower leg.  He  has a past surgical history that includes leg surgeries.  His family history includes Cerebrovascular Disease in his maternal grandfather; Diabetes in his cousin and paternal aunt; Thyroid Cancer in his mother.  He reports that he has never smoked. He has never used smokeless tobacco. He reports that he drinks about 1.5 - 3.0 oz of alcohol per week. He reports that he does not use drugs.    ALLERGIES: He has No Known Allergies.    CURRENT MEDICATIONS: He has a current medication list which includes the following prescription(s): multiple vitamins-minerals, clonazepam, escitalopram, and order for dme.     REVIEW OF SYSTEMS: 3 point review of systems is negative except as noted above.     Exam:   There were no vitals taken for this visit.      CONSTITUTIONAL: healthy, alert and no distress    Manuel is a 33-year-old male with ky-hyperplasia of the right lower extremity.  It appears that most of his increased size over the past 2 years is due to an increase in likely soft tissue and subcutaneous tissue and/or fat.  He does have this lesion which appears to be under the origin of the medial collateral ligament.  This may be old scar.  It is not an area where Manuel is symptomatic or tender.  I am  going to have him see 1 of our orthopedists to determine if anything further needs to be done such as biopsy or watchful waiting or repeat imaging.

## 2019-06-20 NOTE — TELEPHONE ENCOUNTER
RECORDS RECEIVED FROM: Medial right knee mass, referred by Dr. Carpio. Images internal   DATE RECEIVED: Jun 27, 2019    NOTES STATUS DETAILS   OFFICE NOTE from referring provider Internal Dr. Carpio 06/20/19    OFFICE NOTE from other specialist N/A    DISCHARGE SUMMARY from hospital N/A    DISCHARGE REPORT from the ER N/A    OPERATIVE REPORT N/A    MEDICATION LIST Internal    IMPLANT RECORD/STICKER N/A    LABS     CBC/DIFF N/A    CULTURES N/A    INJECTIONS DONE IN RADIOLOGY N/A    MRI Internal 6/18/19   CT SCAN N/A    XRAYS (IMAGES & REPORTS) Internal 6/14/19   TUMOR     PATHOLOGY  Slides & report N/A

## 2019-06-20 NOTE — LETTER
6/20/2019      RE: Manuel Goddard  [address]   [address]  [facility] [address]        Subjective:   Manuel Goddard is a 33 year old male who follows up with right leg pain to review his xray and MRI results.  He has had some increased size in his right lower extremity.  That appears to primarily be subcutaneous and fatty tissue based on the MRI appearance and could be affected by fluid.  There is no note of a mass at the proximal aspect of the medial collateral ligament.  Manuel is not symptomatic at this site.  He has had prior right knee surgeries.  We have reviewed that in detail.    Date of injury: None  Date last seen: 6/14/2019  Following Therapeutic Plan: Yes, imaging      PAST MEDICAL, SOCIAL, SURGICAL AND FAMILY HISTORY: He  has a past medical history of Hypertrophy of bone of lower leg.  He  has a past surgical history that includes leg surgeries.  His family history includes Cerebrovascular Disease in his maternal grandfather; Diabetes in his cousin and paternal aunt; Thyroid Cancer in his mother.  He reports that he has never smoked. He has never used smokeless tobacco. He reports that he drinks about 1.5 - 3.0 oz of alcohol per week. He reports that he does not use drugs.    ALLERGIES: He has No Known Allergies.    CURRENT MEDICATIONS: He has a current medication list which includes the following prescription(s): multiple vitamins-minerals, clonazepam, escitalopram, and order for dme.     REVIEW OF SYSTEMS: 3 point review of systems is negative except as noted above.     Exam:   There were no vitals taken for this visit.      CONSTITUTIONAL: healthy, alert and no distress    Manuel is a 33-year-old male with yk-hyperplasia of the right lower extremity.  It appears that most of his increased size over the past 2 years is due to an increase in likely soft tissue and subcutaneous tissue and/or fat.  He does have this lesion which appears to be under the origin of the medial collateral  ligament.  This may be old scar.  It is not an area where Manuel is symptomatic or tender.  I am going to have him see 1 of our orthopedists to determine if anything further needs to be done such as biopsy or watchful waiting or repeat imaging.    Noble Carpio MD

## 2019-06-24 ASSESSMENT — ENCOUNTER SYMPTOMS
NAIL CHANGES: 0
MYALGIAS: 0
NECK MASS: 0
NECK PAIN: 0
SINUS CONGESTION: 0
TASTE DISTURBANCE: 0
JOINT SWELLING: 1
MUSCLE WEAKNESS: 0
TROUBLE SWALLOWING: 0
BACK PAIN: 0
SKIN CHANGES: 0
MUSCLE CRAMPS: 0
ARTHRALGIAS: 1
STIFFNESS: 1
SMELL DISTURBANCE: 0
SINUS PAIN: 0
POOR WOUND HEALING: 0
SORE THROAT: 0
HOARSE VOICE: 0

## 2019-06-27 ENCOUNTER — PRE VISIT (OUTPATIENT)
Dept: ORTHOPEDICS | Facility: CLINIC | Age: 34
End: 2019-06-27

## 2019-06-27 ENCOUNTER — OFFICE VISIT (OUTPATIENT)
Dept: ORTHOPEDICS | Facility: CLINIC | Age: 34
End: 2019-06-27
Payer: COMMERCIAL

## 2019-06-27 DIAGNOSIS — M79.89 MASS OF SOFT TISSUE: Primary | ICD-10-CM

## 2019-06-27 NOTE — PROGRESS NOTES
[facility], Orthopaedic Oncology Surgery Consultation  by Santana Lopez M.D.    Manuel Goddard MRN# [id number]   Age: 33 year old YOB: 1985     Requesting physician: Jailene Peña            Assessment and Plan:   Assessment:  33-year-old male with past medical history significant for right lower extremity hemihypertrophy with MRI findings consistent with fatty hypertrophy and infiltration.  Without findings concerning for aggressive soft tissue or bony proliferation.     Plan:  -We reassured the patient today that his MRI findings are rather consistent with his known right lower extremity fatty tissue hemihypertrophy.  We would recommend follow-up in 6 to 12 months with repeat MRI for ongoing monitoring.  -We had a prolonged discussion today regarding possible treatment options for his lower extremity that continues to give him trouble.  The patient raised the possibility of an amputation for definitive management.  As the limb has bothered him for a long period of time and is becoming less functional, he would like to explore amputation options.  -We encouraged him to continue to do more research with regards to a below-knee irritation versus above-knee amputation, and the available prostheses in addition to his goals for surgery.  -He is in agreement with this plan all questions were answered.           History of Present Illness:   33 year old male with past medical history of hemihypertrophy for his right lower extremity and lymphadenopathy of unknown origin who presents to clinic today for evaluation of proximal medial calf soft tissue swelling and prior pain.  Patient states that 2 weeks ago he began noticing an enlargement in the soft tissue swelling of his medial calf on the right side.  He had not noticed this swelling before in the setting of prior soft tissue edema and bulky soft tissue.  Denies no injury or trauma.  Denies new onset  numbness tingling change in motor or sensory function in the distal extremity.  Has undergone multiple debulking procedures in the past for fatty hypertrophy in the leg in addition to bony overgrowth.  Has chronic discomfort in his knee secondary to valgus deformity, however this is not changed recently.  Presents to clinic today to discuss MRI findings.    Additionally he would like to discuss the possibility of an amputation in the future.    Current symptoms:  Problem: Right leg mass  Onset and duration: 2 weeks  Awakens from sleep due to sx's:  No  Precipitating Injury:  No    Other joints or sites painful:  No  Fever: No  Appetite change or weight loss: No    Background history:  DX:  1. Pravin-hypertrophy of the right lower extremity  2. Chronic right lower extremity lymphadenopathy    TREATMENTS:  1. Multiple right lower extremity fatty tissue debulking procedures  2. Right ankle osteophytic spur removal             Physical Exam:     EXAMINATION pertinent findings:   VITAL SIGNS: There were no vitals taken for this visit.  There is no height or weight on file to calculate BMI.  RESP: non labored breathing   ABD: benign   SKIN: grossly normal   LYMPHATIC: grossly normal   NEURO: grossly normal   VASCULAR: satisfactory perfusion of all extremities   MUSCULOSKELETAL:   Focused examination of his right lower extremity.  -He has generalized soft tissue hypertrophy in addition to edema from the knee down through the great toe.  He has a Obdulia valgus knee with crepitance on range of motion from 0 to 100 degrees of flexion.  He has pseudolaxity to varus stress testing about 0 30 degrees of flexion.  His digital warm well perfused and he is grossly sensory intact to light touch in the foot.  He has a palpable posterior tibialis pulse.  There is soft tissue hypertrophy of the medial aspect of the proximal calf, there is not tender to palpation.                  Data:   All laboratory data reviewed  All imaging studies  reviewed by me     MRI lower extremities -prominent subcutaneous fat, grossly symmetric when compared to the left lower extremity.  Consistent with patient's known ky-hyperplasia of the lower extremity.  Mild bone marrow edema of the medial femoral condyle without associated concerning lesion.       Attending MD (Dr. Santana Lopez) Attestation :  This patient was seen and evaluated by me including a history, exam, and interpretation of all imaging and/or lab data.  Either a training physician (resident/fellow), who also saw the patient, or scribe has documented the visit in the attached note.    Santana Lopez MD  [facility] Family Professor  Oncology and Adult Reconstructive Surgery  Dept Orthopaedic Surgery, [facility]  [phone] office, [phone] pager  [email or web address]        DATA for DOCUMENTATION:         Past Medical History:     Patient Active Problem List   Diagnosis     Hypertrophy of bone of lower leg     Lymphedema of right lower extremity     Anxiety     Social anxiety disorder     Past Medical History:   Diagnosis Date     Hypertrophy of bone of lower leg     see details in problem list       Also see scanned health assessment forms.       Past Surgical History:     Past Surgical History:   Procedure Laterality Date     leg surgeries      5-6 debulking surgeries of R lower leg and stunding of the growth plate            Social History:     Social History     Socioeconomic History     Marital status: Single     Spouse name: Not on file     Number of children: Not on file     Years of education: Not on file     Highest education level: Not on file   Occupational History     Not on file   Social Needs     Financial resource strain: Not on file     Food insecurity:     Worry: Not on file     Inability: Not on file     Transportation needs:     Medical: Not on file     Non-medical: Not on file   Tobacco Use     Smoking status: Never Smoker     Smokeless tobacco: Never Used   Substance and  Sexual Activity     Alcohol use: Yes     Alcohol/week: 1.5 - 3.0 oz     Types: 3 - 6 Standard drinks or equivalent per week     Drug use: No     Sexual activity: Yes     Partners: Male   Lifestyle     Physical activity:     Days per week: Not on file     Minutes per session: Not on file     Stress: Not on file   Relationships     Social connections:     Talks on phone: Not on file     Gets together: Not on file     Attends [employer] service: Not on file     Active member of club or organization: Not on file     Attends meetings of clubs or organizations: Not on file     Relationship status: Not on file     Intimate partner violence:     Fear of current or ex partner: Not on file     Emotionally abused: Not on file     Physically abused: Not on file     Forced sexual activity: Not on file   Other Topics Concern     Not on file   Social History Narrative     Not on file            Family History:       Family History   Problem Relation Age of Onset     Cerebrovascular Disease Maternal Grandfather         late 70's     Thyroid Cancer Mother      Diabetes Paternal Aunt         ?DMI     Diabetes Cousin         ?DMI            Medications:     Current Outpatient Medications   Medication Sig     clonazePAM (KLONOPIN) 1 MG tablet Take 1 tablet (1 mg) by mouth nightly as needed for anxiety (Patient not taking: Reported on 6/14/2019)     escitalopram (LEXAPRO) 10 MG tablet Take 1 tablet (10 mg) by mouth daily (Patient not taking: Reported on 6/14/2019)     Multiple Vitamins-Minerals (MULTIVITAMIN ADULT PO)      order for DME Compression Garments for the R LE:  Custom 20-30mmHg thigh high stocking, Toe cap for hallux swelling  Full leg night garment to include foot and toes (Patient not taking: Reported on 6/14/2019)     No current facility-administered medications for this visit.               Review of Systems:   A comprehensive 10 point review of systems (constitutional, ENT, cardiac, peripheral vascular, lymphatic,  respiratory, GI, , Musculoskeletal, skin, Neurological) was performed and found to be negative except as described in this note.     See intake form completed by patient

## 2019-06-27 NOTE — LETTER
6/27/2019       RE: Manuel Goddard  [address]   [address]  [facility] [address]     Dear Colleague,    Thank you for referring your patient, Manuel Goddard, to the [facility]. Please see a copy of my visit note below.        [facility], Orthopaedic Oncology Surgery Consultation  by Santana Lopez M.D.    Manuel Goddard MRN# [id number]   Age: 33 year old YOB: 1985     Requesting physician: Jailene Peña            Assessment and Plan:   Assessment:  33-year-old male with past medical history significant for right lower extremity hemihypertrophy with MRI findings consistent with fatty hypertrophy and infiltration.  Without findings concerning for aggressive soft tissue or bony proliferation.     Plan:  -We reassured the patient today that his MRI findings are rather consistent with his known right lower extremity fatty tissue hemihypertrophy.  We would recommend follow-up in 6 to 12 months with repeat MRI for ongoing monitoring.  -We had a prolonged discussion today regarding possible treatment options for his lower extremity that continues to give him trouble.  The patient raised the possibility of an amputation for definitive management.  As the limb has bothered him for a long period of time and is becoming less functional, he would like to explore amputation options.  -We encouraged him to continue to do more research with regards to a below-knee irritation versus above-knee amputation, and the available prostheses in addition to his goals for surgery.  -He is in agreement with this plan all questions were answered.           History of Present Illness:   33 year old male with past medical history of hemihypertrophy for his right lower extremity and lymphadenopathy of unknown origin who presents to clinic today for evaluation of proximal medial calf soft tissue swelling and  prior pain.  Patient states that 2 weeks ago he began noticing an enlargement in the soft tissue swelling of his medial calf on the right side.  He had not noticed this swelling before in the setting of prior soft tissue edema and bulky soft tissue.  Denies no injury or trauma.  Denies new onset numbness tingling change in motor or sensory function in the distal extremity.  Has undergone multiple debulking procedures in the past for fatty hypertrophy in the leg in addition to bony overgrowth.  Has chronic discomfort in his knee secondary to valgus deformity, however this is not changed recently.  Presents to clinic today to discuss MRI findings.    Additionally he would like to discuss the possibility of an amputation in the future.    Current symptoms:  Problem: Right leg mass  Onset and duration: 2 weeks  Awakens from sleep due to sx's:  No  Precipitating Injury:  No    Other joints or sites painful:  No  Fever: No  Appetite change or weight loss: No    Background history:  DX:  1. Pravin-hypertrophy of the right lower extremity  2. Chronic right lower extremity lymphadenopathy    TREATMENTS:  1. Multiple right lower extremity fatty tissue debulking procedures  2. Right ankle osteophytic spur removal         Physical Exam:     EXAMINATION pertinent findings:   VITAL SIGNS: There were no vitals taken for this visit.  There is no height or weight on file to calculate BMI.  RESP: non labored breathing   ABD: benign   SKIN: grossly normal   LYMPHATIC: grossly normal   NEURO: grossly normal   VASCULAR: satisfactory perfusion of all extremities   MUSCULOSKELETAL:   Focused examination of his right lower extremity.  -He has generalized soft tissue hypertrophy in addition to edema from the knee down through the great toe.  He has a Obdulia valgus knee with crepitance on range of motion from 0 to 100 degrees of flexion.  He has pseudolaxity to varus stress testing about 0 30 degrees of flexion.  His digital warm well perfused  and he is grossly sensory intact to light touch in the foot.  He has a palpable posterior tibialis pulse.  There is soft tissue hypertrophy of the medial aspect of the proximal calf, there is not tender to palpation.              Data:   All laboratory data reviewed  All imaging studies reviewed by me     MRI lower extremities -prominent subcutaneous fat, grossly symmetric when compared to the left lower extremity.  Consistent with patient's known ky-hyperplasia of the lower extremity.  Mild bone marrow edema of the medial femoral condyle without associated concerning lesion.    DATA for DOCUMENTATION:         Past Medical History:     Patient Active Problem List   Diagnosis     Hypertrophy of bone of lower leg     Lymphedema of right lower extremity     Anxiety     Social anxiety disorder     Past Medical History:   Diagnosis Date     Hypertrophy of bone of lower leg     see details in problem list       Also see scanned health assessment forms.       Past Surgical History:     Past Surgical History:   Procedure Laterality Date     leg surgeries      5-6 debulking surgeries of R lower leg and stunding of the growth plate            Social History:     Social History     Socioeconomic History     Marital status: Single     Spouse name: Not on file     Number of children: Not on file     Years of education: Not on file     Highest education level: Not on file   Occupational History     Not on file   Social Needs     Financial resource strain: Not on file     Food insecurity:     Worry: Not on file     Inability: Not on file     Transportation needs:     Medical: Not on file     Non-medical: Not on file   Tobacco Use     Smoking status: Never Smoker     Smokeless tobacco: Never Used   Substance and Sexual Activity     Alcohol use: Yes     Alcohol/week: 1.5 - 3.0 oz     Types: 3 - 6 Standard drinks or equivalent per week     Drug use: No     Sexual activity: Yes     Partners: Male   Lifestyle     Physical activity:      Days per week: Not on file     Minutes per session: Not on file     Stress: Not on file   Relationships     Social connections:     Talks on phone: Not on file     Gets together: Not on file     Attends Mosque service: Not on file     Active member of club or organization: Not on file     Attends meetings of clubs or organizations: Not on file     Relationship status: Not on file     Intimate partner violence:     Fear of current or ex partner: Not on file     Emotionally abused: Not on file     Physically abused: Not on file     Forced sexual activity: Not on file   Other Topics Concern     Not on file   Social History Narrative     Not on file            Family History:     Family History   Problem Relation Age of Onset     Cerebrovascular Disease Maternal Grandfather         late 70's     Thyroid Cancer Mother      Diabetes Paternal Aunt         ?DMI     Diabetes Cousin         ?DMI            Medications:     Current Outpatient Medications   Medication Sig     clonazePAM (KLONOPIN) 1 MG tablet Take 1 tablet (1 mg) by mouth nightly as needed for anxiety (Patient not taking: Reported on 6/14/2019)     escitalopram (LEXAPRO) 10 MG tablet Take 1 tablet (10 mg) by mouth daily (Patient not taking: Reported on 6/14/2019)     Multiple Vitamins-Minerals (MULTIVITAMIN ADULT PO)      order for DME Compression Garments for the R LE:  Custom 20-30mmHg thigh high stocking, Toe cap for hallux swelling  Full leg night garment to include foot and toes (Patient not taking: Reported on 6/14/2019)     No current facility-administered medications for this visit.             Review of Systems:   A comprehensive 10 point review of systems (constitutional, ENT, cardiac, peripheral vascular, lymphatic, respiratory, GI, , Musculoskeletal, skin, Neurological) was performed and found to be negative except as described in this note.     See intake form completed by patient    Attending MD (Dr. Santana Lopez) Attestation :  This  patient was seen and evaluated by me including a history, exam, and interpretation of all imaging and/or lab data.  Either a training physician (resident/fellow), who also saw the patient, or scribe has documented the visit in the attached note.    MD Adelaida Walker Family Professor  Oncology and Adult Reconstructive Surgery  Dept Orthopaedic Surgery, [facility]  [phone] office, [phone] pager  [email or web address]

## 2019-06-27 NOTE — NURSING NOTE
Chief Complaint   Patient presents with     Consult     Pt. states that he is here today for Pain/Stiffness in his Right Knee. Pt. states that he has had Edema of his Right Leg since birth. He states that he has no known DX for his Edema. He states that he has always had a Fatty Mass/Edema in his Right Inner Lat. Knee. He states that he did notice an area in the Posterior Knee that was more firm and more sensative to touch. He was seen by Dr. Carpio, an MRI was ordered and concerns of his overall Right Knee Anterior/Posterior/Lat. aspects.      Consult     Pt. explains that he has noticed loss of function in his Right Leg from his Knee to his Foot. He states that he has had multiple surgeries of his Inner Lat. and Anterior Ankle, Bone spurs removed and debulking with most recent surgery he had his Great Toe Bone removed due to pain. He states that he had surgery w/Dr. Correa.        33 year old  1985          [employer]  FOR [facility] [place] - [facility]    No Known Allergies    Current Outpatient Medications   Medication     Multiple Vitamins-Minerals (MULTIVITAMIN ADULT PO)     clonazePAM (KLONOPIN) 1 MG tablet     escitalopram (LEXAPRO) 10 MG tablet     order for DME     No current facility-administered medications for this visit.

## 2019-07-08 ENCOUNTER — MYC MEDICAL ADVICE (OUTPATIENT)
Dept: INTERNAL MEDICINE | Facility: CLINIC | Age: 34
End: 2019-07-08

## 2019-09-13 ENCOUNTER — OFFICE VISIT (OUTPATIENT)
Dept: INTERNAL MEDICINE | Facility: CLINIC | Age: 34
End: 2019-09-13
Payer: COMMERCIAL

## 2019-09-13 VITALS
SYSTOLIC BLOOD PRESSURE: 103 MMHG | DIASTOLIC BLOOD PRESSURE: 68 MMHG | HEART RATE: 51 BPM | OXYGEN SATURATION: 96 % | BODY MASS INDEX: 21.92 KG/M2 | WEIGHT: 157.2 LBS

## 2019-09-13 DIAGNOSIS — Z00.00 ANNUAL PHYSICAL EXAM: Primary | ICD-10-CM

## 2019-09-13 DIAGNOSIS — G47.00 INSOMNIA, UNSPECIFIED TYPE: ICD-10-CM

## 2019-09-13 DIAGNOSIS — Z00.00 ANNUAL PHYSICAL EXAM: ICD-10-CM

## 2019-09-13 LAB
ALBUMIN SERPL-MCNC: 4 G/DL (ref 3.4–5)
ALP SERPL-CCNC: 47 U/L (ref 40–150)
ALT SERPL W P-5'-P-CCNC: 19 U/L (ref 0–70)
ANION GAP SERPL CALCULATED.3IONS-SCNC: 4 MMOL/L (ref 3–14)
AST SERPL W P-5'-P-CCNC: 10 U/L (ref 0–45)
BILIRUB SERPL-MCNC: 0.6 MG/DL (ref 0.2–1.3)
BUN SERPL-MCNC: 12 MG/DL (ref 7–30)
CALCIUM SERPL-MCNC: 8.9 MG/DL (ref 8.5–10.1)
CHLORIDE SERPL-SCNC: 106 MMOL/L (ref 94–109)
CHOLEST SERPL-MCNC: 173 MG/DL
CO2 SERPL-SCNC: 29 MMOL/L (ref 20–32)
CREAT SERPL-MCNC: 0.84 MG/DL (ref 0.66–1.25)
ERYTHROCYTE [DISTWIDTH] IN BLOOD BY AUTOMATED COUNT: 13.6 % (ref 10–15)
GFR SERPL CREATININE-BSD FRML MDRD: >90 ML/MIN/{1.73_M2}
GLUCOSE SERPL-MCNC: 90 MG/DL (ref 70–99)
HCT VFR BLD AUTO: 45.7 % (ref 40–53)
HDLC SERPL-MCNC: 72 MG/DL
HGB BLD-MCNC: 15.2 G/DL (ref 13.3–17.7)
LDLC SERPL CALC-MCNC: 94 MG/DL
MCH RBC QN AUTO: 30.8 PG (ref 26.5–33)
MCHC RBC AUTO-ENTMCNC: 33.3 G/DL (ref 31.5–36.5)
MCV RBC AUTO: 93 FL (ref 78–100)
NONHDLC SERPL-MCNC: 101 MG/DL
PLATELET # BLD AUTO: 173 10E9/L (ref 150–450)
POTASSIUM SERPL-SCNC: 3.9 MMOL/L (ref 3.4–5.3)
PROT SERPL-MCNC: 7 G/DL (ref 6.8–8.8)
RBC # BLD AUTO: 4.93 10E12/L (ref 4.4–5.9)
SODIUM SERPL-SCNC: 139 MMOL/L (ref 133–144)
TRIGL SERPL-MCNC: 38 MG/DL
WBC # BLD AUTO: 3.3 10E9/L (ref 4–11)

## 2019-09-13 ASSESSMENT — ENCOUNTER SYMPTOMS
SPEECH CHANGE: 0
DECREASED CONCENTRATION: 0
WEIGHT GAIN: 0
TROUBLE SWALLOWING: 0
LEG PAIN: 0
DIFFICULTY URINATING: 0
COUGH DISTURBING SLEEP: 0
SINUS CONGESTION: 0
POLYDIPSIA: 0
INCREASED ENERGY: 1
INSOMNIA: 0
EYE PAIN: 0
DYSPNEA ON EXERTION: 0
EYE IRRITATION: 0
PANIC: 0
HEADACHES: 0
FEVER: 0
SEIZURES: 0
NIGHT SWEATS: 1
SHORTNESS OF BREATH: 0
DOUBLE VISION: 0
TACHYCARDIA: 0
DIARRHEA: 0
NUMBNESS: 0
NAUSEA: 0
MUSCLE CRAMPS: 0
DEPRESSION: 0
TASTE DISTURBANCE: 0
ORTHOPNEA: 0
EYE REDNESS: 0
SNORES LOUDLY: 0
POSTURAL DYSPNEA: 0
NAIL CHANGES: 0
DYSURIA: 0
HEMOPTYSIS: 0
JAUNDICE: 0
RESPIRATORY PAIN: 0
LOSS OF CONSCIOUSNESS: 0
SKIN CHANGES: 0
WEIGHT LOSS: 0
DECREASED APPETITE: 0
EXTREMITY NUMBNESS: 0
COUGH: 0
BLOOD IN STOOL: 0
ALTERED TEMPERATURE REGULATION: 0
HYPOTENSION: 0
CLAUDICATION: 0
DIZZINESS: 0
VOMITING: 0
POOR WOUND HEALING: 0
JOINT SWELLING: 0
MEMORY LOSS: 0
STIFFNESS: 1
SMELL DISTURBANCE: 0
BLOATING: 0
CHILLS: 0
EYE WATERING: 0
HOARSE VOICE: 0
FATIGUE: 1
PARALYSIS: 0
LIGHT-HEADEDNESS: 0
TREMORS: 0
POLYPHAGIA: 0
SWOLLEN GLANDS: 0
BACK PAIN: 1
NERVOUS/ANXIOUS: 0
NECK PAIN: 0
SINUS PAIN: 0
CONSTIPATION: 0
RECTAL BLEEDING: 0
MYALGIAS: 1
ARTHRALGIAS: 1
TINGLING: 0
NECK MASS: 0
HALLUCINATIONS: 0
SYNCOPE: 0
WHEEZING: 0
HEMATURIA: 0
BRUISES/BLEEDS EASILY: 0
PALPITATIONS: 0
SORE THROAT: 0
EXERCISE INTOLERANCE: 0
DISTURBANCES IN COORDINATION: 0
ABDOMINAL PAIN: 0
SLEEP DISTURBANCES DUE TO BREATHING: 0
WEAKNESS: 0
BOWEL INCONTINENCE: 0
HYPERTENSION: 0
RECTAL PAIN: 0
HEARTBURN: 0
FLANK PAIN: 0
MUSCLE WEAKNESS: 0
SPUTUM PRODUCTION: 0
LEG SWELLING: 0

## 2019-09-13 ASSESSMENT — PAIN SCALES - GENERAL: PAINLEVEL: MILD PAIN (3)

## 2019-09-13 NOTE — PATIENT INSTRUCTIONS
[facility] Medication Refill Request Information:  * Please contact your pharmacy regarding ANY request for medication refills.  ** [facility] Prescription Fax = [phone]  * Please allow 3 business days for routine medication refills.  * Please allow 5 business days for controlled substance medication refills.     [facility] Test Result notification information:  *You will be notified with in 7-10 days of your appointment day regarding the results of your test.  If you are on MyChart you will be notified as soon as the provider has reviewed the results and signed off on them.    [facility]: [phone]

## 2019-09-13 NOTE — PROGRESS NOTES
CC:   Chief Complaint   Patient presents with     Physical     pt her for annual physical       History of Present Illness   Manuel Goddard is here for a routine physical.      New job as  for company in [place].  Working from home  Lexapro didn't really seem to help, so discontinued.  Klonopin has been sporatic.  Ran out.  Would like a refill once he knows which pharmacy    Night sweats: July/August was waking up drenched.  Has since stopped.    Ear pain: related to swimmers ear.  Now resolved    Depression screen:  PHQ-2 Score:     PHQ-2 ( 1999 Pfizer) 9/13/2019 6/14/2019   Q1: Little interest or pleasure in doing things 0 0   Q2: Feeling down, depressed or hopeless 0 0   PHQ-2 Score 0 0       Tobacco screen:  History   Smoking Status     Never Smoker   Smokeless Tobacco     Never Used       Obesity screen:  Body mass index is 21.92 kg/m .  Healthy diet.  Regularly swimming    Review of Systems   Review of Systems     Constitutional:  Positive for fatigue, night sweats, recent stressors and increased energy. Negative for fever, chills, weight loss, weight gain, decreased appetite, height loss, post-operative complications, incisional pain, hallucinations, hyperactivity and confused.   HENT:  Positive for ear pain. Negative for hearing loss, tinnitus, nosebleeds, trouble swallowing, hoarse voice, mouth sores, sore throat, ear discharge, tooth pain, gum tenderness, taste disturbance, smell disturbance, hearing aid, bleeding gums, dry mouth, sinus pain, sinus congestion and neck mass.    Eyes:  Negative for double vision, pain, redness, eye pain, decreased vision, eye watering, eye bulging, eye dryness, flashing lights, spots, floaters, strabismus, tunnel vision, jaundice and eye irritation.   Respiratory:   Negative for cough, hemoptysis, sputum production, shortness of breath, wheezing, sleep disturbances due to breathing, snores loudly, respiratory pain, dyspnea on exertion, cough disturbing sleep  and postural dyspnea.    Cardiovascular:  Negative for chest pain, dyspnea on exertion, palpitations, orthopnea, claudication, leg swelling, fingers/toes turn blue, hypertension, hypotension, syncope, history of heart murmur, chest pain on exertion, chest pain at rest, pacemaker, few scattered varicosities, leg pain, sleep disturbances due to breathing, tachycardia, light-headedness, exercise intolerance and edema.   Gastrointestinal:  Negative for heartburn, nausea, vomiting, abdominal pain, diarrhea, constipation, blood in stool, melena, rectal pain, bloating, hemorrhoids, bowel incontinence, jaundice, rectal bleeding, coffee ground emesis and change in stool.   Genitourinary:  Negative for bladder incontinence, dysuria, urgency, hematuria, flank pain, difficulty urinating, nocturia, voiding less frequently, scrotal pain, ulcerations, penile discharge, male genitourinary complaint and reduced libido.   Musculoskeletal:  Positive for myalgias, back pain, arthralgias, stiffness and bone pain. Negative for joint swelling, muscle cramps, neck pain, muscle weakness and fracture.   Skin:  Negative for nail changes, itching, poor wound healing, rash, hair changes, skin changes, acne, warts, poor wound healing, scarring, flaky skin, Raynaud's phenomenon, sensitivity to sunlight and skin thickening.   Neurological:  Negative for dizziness, tingling, tremors, speech change, seizures, loss of consciousness, weakness, light-headedness, numbness, headaches, disturbances in coordination, extremity numbness, memory loss, difficulty walking and paralysis.   Endo/Heme:  Negative for anemia, swollen glands and bruises/bleeds easily.   Psychiatric/Behavioral:  Negative for depression, hallucinations, memory loss, decreased concentration, mood swings and panic attacks.    Endocrine:  Negative for altered temperature regulation, polyphagia, polydipsia, unwanted hair growth and change in facial hair.      Medications   Medications and  allergies were reviewed and updated in the chart    Family History   Family history was reviewed and updated as needed in the chart    Past Medical History   Past medical history was reviewed and updated  Patient Active Problem List   Diagnosis     Hypertrophy of bone of lower leg     Lymphedema of right lower extremity     Anxiety     Social anxiety disorder       Physical Exam   /68 (BP Location: Right arm, Patient Position: Sitting, Cuff Size: Adult Regular)   Pulse 51   Wt 71.3 kg (157 lb 3.2 oz)   SpO2 96%   BMI 21.92 kg/m      GENERAL APPEARANCE: healthy, alert and no distress     EYES: EOMI, - PERRL     HENT: ear canals and TM's normal and nose and mouth without ulcers or lesions     NECK: no adenopathy, no asymmetry, masses, or scars and thyroid normal to palpation     RESP: lungs clear to auscultation - no rales, rhonchi or wheezes     CV: regular rates and rhythm, normal S1 S2, no S3 or S4 and no murmur, click or rub -     ABDOMEN:  soft, nontender, no HSM or masses and bowel sounds normal     MS: chronic hypertrophy of RLE.  Left extremity normal     SKIN: no suspicious lesions or rashes     NEURO: mentation intact and speech normal     PSYCH: mentation appears normal. and affect normal/bright     LYMPHATICS: No  cervical, or supraclavicular nodes    Assessment & Plan   # Preventive Care Visit:     Anxiety: he will message me with pharmacy to send refill of klonopin    Annual physical exam  - HIV Screening  - Lipid panel reflex to direct LDL Non-fasting  - CBC with platelets  - Comprehensive metabolic panel    Night sweats:   Further testing if symptoms recur.  Otherwise likely viral      RTC: No follow-ups on file.      Jailene Ivory MD

## 2019-09-13 NOTE — NURSING NOTE
Chief Complaint   Patient presents with     Physical     pt her for annual physical       KITTY Ivey at 8:44 AM on 9/13/2019.

## 2019-09-16 LAB — HIV 1+2 AB+HIV1 P24 AG SERPL QL IA: NONREACTIVE

## 2020-03-10 ENCOUNTER — HEALTH MAINTENANCE LETTER (OUTPATIENT)
Age: 35
End: 2020-03-10

## 2020-12-27 ENCOUNTER — HEALTH MAINTENANCE LETTER (OUTPATIENT)
Age: 35
End: 2020-12-27

## 2021-04-06 ENCOUNTER — OFFICE VISIT (OUTPATIENT)
Dept: FAMILY MEDICINE | Facility: CLINIC | Age: 36
End: 2021-04-06
Payer: COMMERCIAL

## 2021-04-06 ENCOUNTER — TELEPHONE (OUTPATIENT)
Dept: FAMILY MEDICINE | Facility: CLINIC | Age: 36
End: 2021-04-06

## 2021-04-06 VITALS
SYSTOLIC BLOOD PRESSURE: 121 MMHG | HEIGHT: 71 IN | BODY MASS INDEX: 21.53 KG/M2 | HEART RATE: 61 BPM | WEIGHT: 153.8 LBS | TEMPERATURE: 98.3 F | OXYGEN SATURATION: 96 % | DIASTOLIC BLOOD PRESSURE: 77 MMHG

## 2021-04-06 DIAGNOSIS — R61 NIGHT SWEATS: Primary | ICD-10-CM

## 2021-04-06 DIAGNOSIS — M95.9 BONE DEFORMITY: ICD-10-CM

## 2021-04-06 DIAGNOSIS — D70.9 NEUTROPENIA, UNSPECIFIED TYPE (H): ICD-10-CM

## 2021-04-06 DIAGNOSIS — R21 RASH: ICD-10-CM

## 2021-04-06 DIAGNOSIS — L29.9 ITCHING: ICD-10-CM

## 2021-04-06 LAB
ALBUMIN SERPL-MCNC: 4.1 G/DL (ref 3.4–5)
ALP SERPL-CCNC: 58 U/L (ref 40–150)
ALT SERPL W P-5'-P-CCNC: 24 U/L (ref 0–70)
ANION GAP SERPL CALCULATED.3IONS-SCNC: 5 MMOL/L (ref 3–14)
AST SERPL W P-5'-P-CCNC: 14 U/L (ref 0–45)
BASOPHILS # BLD AUTO: 0 10E9/L (ref 0–0.2)
BASOPHILS NFR BLD AUTO: 1.6 %
BILIRUB SERPL-MCNC: 0.5 MG/DL (ref 0.2–1.3)
BUN SERPL-MCNC: 16 MG/DL (ref 7–30)
CALCIUM SERPL-MCNC: 9.4 MG/DL (ref 8.5–10.1)
CHLORIDE SERPL-SCNC: 105 MMOL/L (ref 94–109)
CO2 SERPL-SCNC: 25 MMOL/L (ref 20–32)
CREAT SERPL-MCNC: 0.99 MG/DL (ref 0.66–1.25)
DIFFERENTIAL METHOD BLD: ABNORMAL
EOSINOPHIL # BLD AUTO: 0 10E9/L (ref 0–0.7)
EOSINOPHIL NFR BLD AUTO: 1.6 %
ERYTHROCYTE [DISTWIDTH] IN BLOOD BY AUTOMATED COUNT: 13.1 % (ref 10–15)
ERYTHROCYTE [SEDIMENTATION RATE] IN BLOOD BY WESTERGREN METHOD: 4 MM/H (ref 0–15)
FOLATE SERPL-MCNC: 4.7 NG/ML
GFR SERPL CREATININE-BSD FRML MDRD: >90 ML/MIN/{1.73_M2}
GLUCOSE SERPL-MCNC: 98 MG/DL (ref 70–99)
HCT VFR BLD AUTO: 46.6 % (ref 40–53)
HGB BLD-MCNC: 15.6 G/DL (ref 13.3–17.7)
HIV 1+2 AB+HIV1 P24 AG SERPL QL IA: NONREACTIVE
IMM GRANULOCYTES # BLD: 0 10E9/L (ref 0–0.4)
IMM GRANULOCYTES NFR BLD: 0 %
LYMPHOCYTES # BLD AUTO: 1 10E9/L (ref 0.8–5.3)
LYMPHOCYTES NFR BLD AUTO: 42 %
MCH RBC QN AUTO: 30.2 PG (ref 26.5–33)
MCHC RBC AUTO-ENTMCNC: 33.5 G/DL (ref 31.5–36.5)
MCV RBC AUTO: 90 FL (ref 78–100)
MONOCYTES # BLD AUTO: 0.4 10E9/L (ref 0–1.3)
MONOCYTES NFR BLD AUTO: 15.2 %
NEUTROPHILS # BLD AUTO: 1 10E9/L (ref 1.6–8.3)
NEUTROPHILS NFR BLD AUTO: 39.6 %
NRBC # BLD AUTO: 0 10*3/UL
NRBC BLD AUTO-RTO: 0 /100
PLATELET # BLD AUTO: 181 10E9/L (ref 150–450)
POTASSIUM SERPL-SCNC: 4 MMOL/L (ref 3.4–5.3)
PROT SERPL-MCNC: 7.5 G/DL (ref 6.8–8.8)
RBC # BLD AUTO: 5.16 10E12/L (ref 4.4–5.9)
SODIUM SERPL-SCNC: 136 MMOL/L (ref 133–144)
TSH SERPL DL<=0.005 MIU/L-ACNC: 2.61 MU/L (ref 0.4–4)
WBC # BLD AUTO: 2.4 10E9/L (ref 4–11)

## 2021-04-06 ASSESSMENT — ANXIETY QUESTIONNAIRES
3. WORRYING TOO MUCH ABOUT DIFFERENT THINGS: NOT AT ALL
2. NOT BEING ABLE TO STOP OR CONTROL WORRYING: SEVERAL DAYS
7. FEELING AFRAID AS IF SOMETHING AWFUL MIGHT HAPPEN: NOT AT ALL
IF YOU CHECKED OFF ANY PROBLEMS ON THIS QUESTIONNAIRE, HOW DIFFICULT HAVE THESE PROBLEMS MADE IT FOR YOU TO DO YOUR WORK, TAKE CARE OF THINGS AT HOME, OR GET ALONG WITH OTHER PEOPLE: NOT DIFFICULT AT ALL
GAD7 TOTAL SCORE: 2
1. FEELING NERVOUS, ANXIOUS, OR ON EDGE: SEVERAL DAYS
5. BEING SO RESTLESS THAT IT IS HARD TO SIT STILL: NOT AT ALL
6. BECOMING EASILY ANNOYED OR IRRITABLE: NOT AT ALL

## 2021-04-06 ASSESSMENT — MIFFLIN-ST. JEOR: SCORE: 1646.34

## 2021-04-06 ASSESSMENT — PATIENT HEALTH QUESTIONNAIRE - PHQ9
SUM OF ALL RESPONSES TO PHQ QUESTIONS 1-9: 3
5. POOR APPETITE OR OVEREATING: NOT AT ALL

## 2021-04-06 NOTE — PATIENT INSTRUCTIONS
Night sweats, itching skin and rash  Consider multiple differentials: dry skin, Non-Hodgkins lymphoma, thyroid issue, auto immune disorder  You do not have physical findings consistent with Lymphoma  Will do lab work up: CBC with differential, ESR, CMP, TSH with reflex, HIV  Continue to keep skin well hydrated.     Refer to rheumatology for consult relative to above, FMH autoimmune disorder, right ankle/foot bony deformity.     Will notify you of labs via My Chart.

## 2021-04-06 NOTE — PROGRESS NOTES
HPI       Manuel Goddard is a 35 year old  who presents for   Chief Complaint   Patient presents with     Sweats     not new     Derm Problem     x1 month, woke up, related to inflmmation?     35 year-old male presents to clinic for evaluation of persistent night sweats over past 1 year.  Not associated with fever, but bed is soaked. Denies cough or SOB.  Has also noted itchy patches on body over past 1 month. Also notes itchy skin: RLL and back primarily. Develops transient purplish, red scaly areas on body, right ankle, back. Does swim daily but has been doing this for 5-6 years and did not have recently.  Denies fatigue or weight loss.  Has not had COVID or exposure, no symptoms consistent with COVID in the past 1 year, did get COVID vaccine 3 days ago, but symptoms prior to that.     Has bony abnormality right foot since birth. Has been evalulated by vascular and ortho, uncertain etiology. Right foot and ankle have become more swollen over time.  Notes some pain and limited ROM due to previous surgeries to remove bone spurs and bone.  Is able to walk and do usual activities.    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 Currently has some erythematous papules over back and a dry patch on right ankle by report.     History of anxiety. Took clonazepam in the past, but not recently, however, would like it to remain on med list.  No known allergies.     [facility] is siginificant for mother with thyroid cancer/thryoidectomy and recently diagnosed autoimmune disorder, uncertain etiology. She is current on prednisone.         Problem, Medication and Allergy Lists were      Patient Active Problem List    Diagnosis Date Noted     Anxiety 04/11/2018     Priority: Medium     Social anxiety disorder 04/11/2018     Priority: Medium     Lymphedema of right lower extremity 09/19/2017     Priority: Medium     Hypertrophy of bone of lower leg 06/17/2015     Priority: Medium      Since birth, hypertrophy of R leg/foot.  Mostly hypertrophy of fatty tissue -- ?benign lipoma; however, required mult debulking surgeries and stunting of the growth plate.           Current Outpatient Medications   Medication Sig Dispense Refill     Multiple Vitamins-Minerals (MULTIVITAMIN ADULT PO)        clonazePAM (KLONOPIN) 1 MG tablet Take 1 tablet (1 mg) by mouth nightly as needed for anxiety (Patient not taking: Reported on 6/14/2019) 30 tablet 2       No Known Allergies.    Patient is   a new patient to this clinic and so  I reviewed/updated the Past Medical History, the Family History and the Social History   Past Medical History:   Diagnosis Date     Chronic osteoarthritis      Degenerative joint disease      Hypertrophy of bone of lower leg     see details in problem list     Family History   Problem Relation Age of Onset     Cerebrovascular Disease Maternal Grandfather         late 70's     Thyroid Cancer Mother      Cancer Mother         Thyroid Cancer     Autoimmune Disease Mother      Diabetes Paternal Aunt         ?DMI     Diabetes Cousin         ?DMI     Diabetes Cousin      Social History     Socioeconomic History     Marital status: Single     Spouse name: None     Number of children: None     Years of education: None     Highest education level: None   Occupational History     None   Social Needs     Financial resource strain: None     Food insecurity     Worry: None     Inability: None     Transportation needs     Medical: None     Non-medical: None   Tobacco Use     Smoking status: Never Smoker     Smokeless tobacco: Never Used   Substance and Sexual Activity     Alcohol use: Yes     Alcohol/week: 2.5 - 5.0 standard drinks     Drug use: No     Sexual activity: Yes     Partners: Male   Lifestyle     Physical activity     Days per week: None     Minutes per session: None     Stress: None   Relationships     Social connections     Talks on phone: None     Gets together: None     Attends  "Orthodoxy service: None     Active member of club or organization: None     Attends meetings of clubs or organizations: None     Relationship status: None     Intimate partner violence     Fear of current or ex partner: None     Emotionally abused: None     Physically abused: None     Forced sexual activity: None   Other Topics Concern     None   Social History Narrative     None   .         Review of Systems:   Review of Systems  ROS: denies fever, fatigue or weight loss.  HEENT: negative for allergy symptoms  RESP: negative for SOB or wheeze  CV: negative for chest pain. Some vascularity right ankle  GI: negative for liver concerns, abdominal pain, stool pattern changes  DERM: as per HPI.   : denies concern for STI currently. Male partners. Last HIV 2019(-)  NEURO: chronic low grade pain RLL, negative numbness or tingling.   MSK: Deformity right foot and ankle since birth, swelling, mild pain  ENDO: night sweats as per HPI  MOOD: negative for anxiety currently.        Physical Exam:     Vitals:    04/06/21 0853   BP: 121/77   Pulse: 61   Temp: 98.3  F (36.8  C)   TempSrc: Oral   SpO2: 96%   Weight: 69.8 kg (153 lb 12.8 oz)   Height: 1.806 m (5' 11.1\")     Body mass index is 21.39 kg/m .  Vitals were reviewed and were normal     Physical Exam  GEN: alert male in no acute distress.   NECK: supple, thyroid smooth and not enlarged. Lymph: negative anterior/posterior cervical, submandibular, supraclavicular, and axillary.  n  LUNGS: CTA with air entry throughout  CV: S1, S2, no MRG, HRRR  ABD: Semi-soft with BSx4 no HSM; liver size WNL, nontender to palpation.   DERM: Scattered random erythematous papules back. 2cm scaly patch right ankle/foot junction medial aspect   MSK: edematous right ankle and foot extending to 3-4 inches above ankle, warm. Limited flexion and lateral movements. Slightly darker pigmentation. Pedal pulses intact.  Varicosity (mild) right medial surface foot  MOOD: slighty anxious    Results: "   Results are ordered and pending    Assessment and Plan        1. Night sweats  Consider endocrine etiology, Could have underlying autoimmune given FMH and other presenting symptoms.   - HIV Antigen Antibody Combo  - TSH with free T4 reflex  - Rheumatology Referral; Future    2. Rash  Skin is warm, moist, does not appear to be dry skin.    - HIV Antigen Antibody Combo  - Erythrocyte sedimentation rate auto  - Anti Nuclear Marilee IgG by IFA with Reflex  - Rheumatology Referral; Future    3. Itching  Will check LFTs in addition to other labs  - HIV Antigen Antibody Combo  - CBC with platelets differential  - Comprehensive metabolic panel  - TSH with free T4 reflex    4. Bone deformity  Uncertain etiology. Will refer to rheum for consult given constellation of symptoms.   - Anti Nuclear Marilee IgG by IFA with Reflex  - Rheumatology Referral; Future    5. Neutropenia, unspecified type (H)  Review of chart indicates this is ongoing.  Monitor.   - Rheumatology Referral; Future         There are no discontinued medications.    Options for treatment and follow-up care were reviewed with the patient. Manuel Goddard  engaged in the decision making process and verbalized understanding of the options discussed and agreed with the final plan.    STEWART Wharton CNP  Folate and B12 added to labs. Hematology referral. See note documenting discussion with hematology. Jeannie WILLIAM CNP

## 2021-04-06 NOTE — NURSING NOTE
"35 year old  Chief Complaint   Patient presents with     Sweats     not new     Derm Problem     x1 month, woke up, related to inflmmation?       Blood pressure 121/77, pulse 61, temperature 98.3  F (36.8  C), temperature source Oral, height 1.806 m (5' 11.1\"), weight 69.8 kg (153 lb 12.8 oz), SpO2 96 %. Body mass index is 21.39 kg/m .      Patient Active Problem List   Diagnosis     Hypertrophy of bone of lower leg     Lymphedema of right lower extremity     Anxiety     Social anxiety disorder       Wt Readings from Last 2 Encounters:   04/06/21 69.8 kg (153 lb 12.8 oz)   09/13/19 71.3 kg (157 lb 3.2 oz)     BP Readings from Last 3 Encounters:   04/06/21 121/77   09/13/19 103/68   06/14/19 112/70       No Known Allergies    Current Outpatient Medications   Medication     Multiple Vitamins-Minerals (MULTIVITAMIN ADULT PO)     clonazePAM (KLONOPIN) 1 MG tablet     No current facility-administered medications for this visit.        Social History     Tobacco Use     Smoking status: Never Smoker     Smokeless tobacco: Never Used   Substance Use Topics     Alcohol use: Yes     Alcohol/week: 2.5 - 5.0 standard drinks     Drug use: No       Honoring Choices - Health Care Directive Guide offered to patient at time of visit.    Health Maintenance Due   Topic Date Due     ADVANCE CARE PLANNING  Never done     HEPATITIS C SCREENING  Never done     INFLUENZA VACCINE (1) Never done     PREVENTIVE CARE VISIT  09/13/2020       Immunization History   Administered Date(s) Administered     TDAP Vaccine (Boostrix) 09/13/2019       No results found for: PAP      Recent Labs   Lab Test 09/13/19  0933 06/17/15  0920   LDL 94  --    HDL 72  --    TRIG 38  --    ALT 19  --    CR 0.84  --    GFRESTIMATED >90  --    GFRESTBLACK >90  --    ALBUMIN 4.0  --    POTASSIUM 3.9  --    TSH  --  3.57       PHQ-2 ( 1999 Pfizer) 9/13/2019 6/14/2019   Q1: Little interest or pleasure in doing things 0 0   Q2: Feeling down, depressed or hopeless 0 0 "   PHQ-2 Score 0 0       PHQ-9 SCORE 4/6/2021   PHQ-9 Total Score 3       PEARL-7 SCORE 4/6/2021   Total Score 2       No flowsheet data found.      Grisel Romo, [facility]  April 6, 2021 8:55 AM

## 2021-04-06 NOTE — TELEPHONE ENCOUNTER
TC to Winnie [facility]iz re: lab results show neutropenia with wbc count 2.4 and neutrophils 1%. Has chronic neutropenia. Discussed presenting signs and history. Recommends referral to hematology. Since this is not new issue with documented low white count in the past, he does not think this is emergent.  Referred to hematology [facility] re: leukopenia and low neutrophils. Dr. Zhou recommended adding B12 and Folate which were added to labs.  Hematology referral made.  Message left for  Patient. My Chart message sent as well.   Rest of Labs WNL. Jeannie WILLIAM CNP

## 2021-04-07 ENCOUNTER — TELEPHONE (OUTPATIENT)
Dept: FAMILY MEDICINE | Facility: CLINIC | Age: 36
End: 2021-04-07

## 2021-04-07 LAB
ANA SER QL IF: NEGATIVE
VIT B12 SERPL-MCNC: 430 PG/ML (ref 193–986)

## 2021-04-07 ASSESSMENT — ANXIETY QUESTIONNAIRES: GAD7 TOTAL SCORE: 2

## 2021-04-07 NOTE — TELEPHONE ENCOUNTER
"[facility]    Phone Message    May a detailed message be left on voicemail: yes     Reason for Call: Appointment Intake    Referring Provider Name: Jeannie Solis CNP  Diagnosis and/or Symptoms: Night sweats, Rash   Bone deformity, Neutropenia, unspecified type. 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 Recent night sweats and itching skin. FMH autoimmune disorder, unknown type.\"     Spoke with pt, [place] [facility] [place] to drive and wants to stay in [place].     Action Taken: Message routed to:  [facility]    Travel Screening: Not Applicable    "

## 2021-04-08 DIAGNOSIS — E53.8 FOLIC ACID DEFICIENCY (NON ANEMIC): Primary | ICD-10-CM

## 2021-04-08 RX ORDER — FOLIC ACID 1 MG/1
1 TABLET ORAL DAILY
Qty: 100 TABLET | Refills: 3 | Status: SHIPPED | OUTPATIENT
Start: 2021-04-08 | End: 2021-09-29

## 2021-04-22 NOTE — TELEPHONE ENCOUNTER
"Please review and advise if Dr. Andersen can see Pt for DX of \"Night sweats, Rash   Bone deformity, Neutropenia, unspecified type. 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 Recent night sweats and itching skin. FMH autoimmune disorder, unknown type.\"       [facility] and [facility] Rheum providers are unable to see Pt with this Dx.  "

## 2021-04-30 DIAGNOSIS — D72.828 NEUTROPHILIA: Primary | ICD-10-CM

## 2021-04-30 DIAGNOSIS — E53.8 FOLATE DEFICIENCY: ICD-10-CM

## 2021-05-04 ENCOUNTER — OFFICE VISIT (OUTPATIENT)
Dept: FAMILY MEDICINE | Facility: CLINIC | Age: 36
End: 2021-05-04
Payer: COMMERCIAL

## 2021-05-04 VITALS
WEIGHT: 156.5 LBS | DIASTOLIC BLOOD PRESSURE: 71 MMHG | TEMPERATURE: 97.9 F | HEART RATE: 57 BPM | OXYGEN SATURATION: 98 % | RESPIRATION RATE: 16 BRPM | BODY MASS INDEX: 21.91 KG/M2 | SYSTOLIC BLOOD PRESSURE: 111 MMHG | HEIGHT: 71 IN

## 2021-05-04 DIAGNOSIS — B35.4 TINEA CORPORIS: Primary | ICD-10-CM

## 2021-05-04 DIAGNOSIS — D72.828 NEUTROPHILIA: ICD-10-CM

## 2021-05-04 DIAGNOSIS — E53.8 FOLATE DEFICIENCY: ICD-10-CM

## 2021-05-04 DIAGNOSIS — Q66.90 FOOT DEFORMITY, CONGENITAL: ICD-10-CM

## 2021-05-04 LAB
BASOPHILS # BLD AUTO: 0 10E9/L (ref 0–0.2)
BASOPHILS NFR BLD AUTO: 0.8 %
DIFFERENTIAL METHOD BLD: ABNORMAL
EOSINOPHIL # BLD AUTO: 0.1 10E9/L (ref 0–0.7)
EOSINOPHIL NFR BLD AUTO: 1.3 %
ERYTHROCYTE [DISTWIDTH] IN BLOOD BY AUTOMATED COUNT: 13.2 % (ref 10–15)
FOLATE SERPL-MCNC: 11.9 NG/ML
HCT VFR BLD AUTO: 46.6 % (ref 40–53)
HGB BLD-MCNC: 15.8 G/DL (ref 13.3–17.7)
IMM GRANULOCYTES # BLD: 0 10E9/L (ref 0–0.4)
IMM GRANULOCYTES NFR BLD: 0.3 %
LYMPHOCYTES # BLD AUTO: 1.5 10E9/L (ref 0.8–5.3)
LYMPHOCYTES NFR BLD AUTO: 38.3 %
MCH RBC QN AUTO: 31.3 PG (ref 26.5–33)
MCHC RBC AUTO-ENTMCNC: 33.9 G/DL (ref 31.5–36.5)
MCV RBC AUTO: 93 FL (ref 78–100)
MONOCYTES # BLD AUTO: 0.4 10E9/L (ref 0–1.3)
MONOCYTES NFR BLD AUTO: 8.9 %
NEUTROPHILS # BLD AUTO: 2 10E9/L (ref 1.6–8.3)
NEUTROPHILS NFR BLD AUTO: 50.4 %
NRBC # BLD AUTO: 0 10*3/UL
NRBC BLD AUTO-RTO: 0 /100
PLATELET # BLD AUTO: 179 10E9/L (ref 150–450)
RBC # BLD AUTO: 5.04 10E12/L (ref 4.4–5.9)
VIT B12 SERPL-MCNC: 493 PG/ML (ref 193–986)
WBC # BLD AUTO: 3.9 10E9/L (ref 4–11)

## 2021-05-04 ASSESSMENT — MIFFLIN-ST. JEOR: SCORE: 1658.59

## 2021-05-04 ASSESSMENT — PAIN SCALES - GENERAL: PAINLEVEL: NO PAIN (0)

## 2021-05-04 NOTE — NURSING NOTE
"35 year old  Chief Complaint   Patient presents with     RECHECK     Abnormal labs       Blood pressure 111/71, pulse 57, temperature 97.9  F (36.6  C), temperature source Oral, resp. rate 16, height 1.806 m (5' 11.1\"), weight 71 kg (156 lb 8 oz), SpO2 98 %. Body mass index is 21.77 kg/m .  BP completed using cuff size:    Patient Active Problem List   Diagnosis     Hypertrophy of bone of lower leg     Lymphedema of right lower extremity     Anxiety     Social anxiety disorder       Wt Readings from Last 2 Encounters:   05/04/21 71 kg (156 lb 8 oz)   04/06/21 69.8 kg (153 lb 12.8 oz)     BP Readings from Last 3 Encounters:   05/04/21 111/71   04/06/21 121/77   09/13/19 103/68       No Known Allergies    Current Outpatient Medications   Medication     folic acid (FOLVITE) 1 MG tablet     Multiple Vitamins-Minerals (MULTIVITAMIN ADULT PO)     clonazePAM (KLONOPIN) 1 MG tablet     No current facility-administered medications for this visit.        Social History     Tobacco Use     Smoking status: Never Smoker     Smokeless tobacco: Never Used   Substance Use Topics     Alcohol use: Yes     Alcohol/week: 2.5 - 5.0 standard drinks     Drug use: No       Honoring Choices - Health Care Directive Guide offered to patient at time of visit.    Health Maintenance Due   Topic Date Due     ADVANCE CARE PLANNING  Never done     Pneumococcal Vaccine: Pediatrics (0 to 5 Years) and At-Risk Patients (6 to 64 Years) (1 of 4 - PCV13) Never done     HEPATITIS C SCREENING  Never done     PREVENTIVE CARE VISIT  09/13/2020       Immunization History   Administered Date(s) Administered     COVID-19,PF,[place] 04/03/2021     TDAP Vaccine (Boostrix) 09/13/2019       No results found for: PAP      Recent Labs   Lab Test 04/06/21  0919 09/13/19  0933 06/17/15  0920   LDL  --  94  --    HDL  --  72  --    TRIG  --  38  --    ALT 24 19  --    CR 0.99 0.84  --    GFRESTIMATED >90 >90  --    GFRESTBLACK >90 >90  --    ALBUMIN 4.1 4.0  --  "   POTASSIUM 4.0 3.9  --    TSH 2.61  --  3.57       PHQ-2 ( 1999 Pfizer) 5/4/2021 9/13/2019   Q1: Little interest or pleasure in doing things 1 0   Q2: Feeling down, depressed or hopeless 1 0   PHQ-2 Score 2 0       PHQ-9 SCORE 4/6/2021   PHQ-9 Total Score 3       PEARL-7 SCORE 4/6/2021   Total Score 2       No flowsheet data found.  Rachele Camara RN    May 4, 2021 7:55 AM

## 2021-05-04 NOTE — PATIENT INSTRUCTIONS
Neutropenia history and folate deficiency  Continue multiple vitamin with folic acid  Recheck CBC with differential today, folate and B12  Suspect previous neutropenia might have been due to proximity of COVID vaccine to lab draw. Will recheck today to see if returned to baseline. If back to baseline, hematology appointment might not be necessary  No appreciable lymphadenopathy on exam    Rash on ankle appears to be fungal  Over the counter clotrimazole 1% cream 2-3 times per day for 2-3 weeks. Return if worsening problems.     Could revisit orthopedics re: deformity of right foot

## 2021-05-04 NOTE — PROGRESS NOTES
HPI       Manuel Goddard is a 35 year old  who presents for   Chief Complaint   Patient presents with     RECHECK     Abnormal labs   35year-old male here for follow up of neutropenia. Neutrophils 1% at last visit 4/6/2021 and wbc 2.4. Has history of baseline neutropenia but this value was lower than previous.  Folate also found to be deficient and folic acid was ordered for him, however, he elected to begin multiple vitamin with folate instead of taking supplement. Would like recheck today. Reveals that he had COVID vaccine (Timmy and Timmy) about 4 days before last encounter so is possible this affected lab results.   Thought he noticed pain in left axilla and swollen node there after last visit, but nothing since then.   Denies fatigue, is engaging in usual activities without SOB, fatigue, chest pain. Is not sleeping more than usual. Appetite is good and weight is stable.  He swims daily.    At last encounter, he was referred to rheumatology re:  History of night sweats, joint pain, itching skin and FMH of autoimmune disorders but that referral was rejected. He reports that night sweats are rare now, itching sensation has resolved. 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 The ankle continues to bother him. LFTs were normal at last check and HIV was negative.     Would also like right ankle checked, Has rash there which is raised, but not itchy. He has not treated it with anything.      Problem, Medication and Allergy Lists were reviewed and updated if needed. Kept folate on med list for now pending lab results.     Patient is an established patient of this clinic. I reviewed and updated history as needed.         Review of Systems:   Review of Systems   GEN: Denies fatigue, sleeping and eating well.  ENDO: night sweats rare. TSH at last encounter WNL  CV: Negative for chest pain  RESP: negative for SOB, cought  GI:negative for nausea or  "abdominal pain  DERM: rash right nakle  LYMPH/Heme: denies bleeding bruising, swollen painful nodes currently.   MSK: right ankle deformity, ongoing discomfort, limited ROM. He feels it has changed.          Physical Exam:     Vitals:    05/04/21 0748   BP: 111/71   BP Location: Left arm   Patient Position: Sitting   Cuff Size: Adult Regular   Pulse: 57   Resp: 16   Temp: 97.9  F (36.6  C)   TempSrc: Oral   SpO2: 98%   Weight: 71 kg (156 lb 8 oz)   Height: 1.806 m (5' 11.1\")     Body mass index is 21.77 kg/m .  Vitals were reviewed and were normal. HR slightly low     Physical Exam  GEN: alert talkative male in NAD  CV: HRRR S1 s2 no MRG  RSP: Lungs clear to auscultation with air entry throughout  LYMPH: few shottly lymph nodes, nontender, anterior cervical chain, rest negative in neck, axilla, supraclavicular, mediastinal.   DERM: erythematous annular lesion (half-moon) right ankle and satellite annular area medial surface. Scaly, mild hyperpigmentation.  MSK: marked deformity right ankle with swelling. No erythema or warmth.    Results:   Results are ordered and pending    Assessment and Plan      1. Folate deficiency  Recheck folate and B12 today. Continue multiple vitamin. If folate remains low, begin folic acid supplementation.  Healthy diet.   - Vitamin B12  - Folate    2. Neutrophilia  Recheck CBC with differential today. Suspect previous labs might have been due to timing of COVID vaccine 4 days before encounter. Patient has history of wbc around 3. If returned to baseline, might be OK to monitor and cancel heme appointment.  Discussed rheumatology referral. I think we can hold on this for now given stability of presentation, resolution of some symptoms.   - CBC with platelets differential    3. Tinea Corporis  Apply clotrimazole 1% 2-3 times per day for 2-3 weeks to rash.  Return if persistent or spreading    4. Foot deformity  Discussed with patient. He could see ortho again for further evaluation as he " senses change in status.     There are no discontinued medications.    Options for treatment and follow-up care were reviewed with the patient. Manuel Goddard  engaged in the decision making process and verbalized understanding of the options discussed and agreed with the final plan.    STEWART Wharton CNP

## 2021-06-01 ENCOUNTER — TELEPHONE (OUTPATIENT)
Dept: ORTHOPEDICS | Facility: CLINIC | Age: 36
End: 2021-06-01

## 2021-06-01 DIAGNOSIS — M89.369: ICD-10-CM

## 2021-06-01 DIAGNOSIS — I89.0 LYMPHEDEMA OF RIGHT LOWER EXTREMITY: ICD-10-CM

## 2021-06-01 DIAGNOSIS — M79.89 MASS OF SOFT TISSUE: Primary | ICD-10-CM

## 2021-06-01 NOTE — TELEPHONE ENCOUNTER
Call to Manuel to perform MRI screening and discuss plan of care. Manuel would like the MRI and clinic visit on same day.  This RN will place order and schedule and return call to Manuel.   Manuel verbalized understanding.    AMOR ValleN, RN  RN Care Coordinator, Dr. John WARREN [facility]

## 2021-06-01 NOTE — TELEPHONE ENCOUNTER
Returned call to Manuel to discuss scheduling of MRI and in-person clinic visit with Dr. Lopez on Monday June 28th.  All questions answered.    AMOR ValleN, RN  RN Care Coordinator, Dr. John WARREN [facility]

## 2021-06-25 ASSESSMENT — ENCOUNTER SYMPTOMS
FEVER: 0
POLYPHAGIA: 0
DEPRESSION: 0
POLYDIPSIA: 0
NIGHT SWEATS: 1
INSOMNIA: 1
DECREASED CONCENTRATION: 0
HALLUCINATIONS: 0
FATIGUE: 0
WEIGHT LOSS: 0
INCREASED ENERGY: 0
ALTERED TEMPERATURE REGULATION: 0
CHILLS: 0
WEIGHT GAIN: 0
DECREASED APPETITE: 0
NERVOUS/ANXIOUS: 1
PANIC: 0

## 2021-06-28 ENCOUNTER — OFFICE VISIT (OUTPATIENT)
Dept: ORTHOPEDICS | Facility: CLINIC | Age: 36
End: 2021-06-28
Payer: COMMERCIAL

## 2021-06-28 ENCOUNTER — ANCILLARY PROCEDURE (OUTPATIENT)
Dept: MRI IMAGING | Facility: CLINIC | Age: 36
End: 2021-06-28
Attending: ORTHOPAEDIC SURGERY
Payer: COMMERCIAL

## 2021-06-28 DIAGNOSIS — I89.0 LYMPHEDEMA OF RIGHT LOWER EXTREMITY: Primary | ICD-10-CM

## 2021-06-28 DIAGNOSIS — I89.0 LYMPHEDEMA OF RIGHT LOWER EXTREMITY: ICD-10-CM

## 2021-06-28 DIAGNOSIS — M89.369: ICD-10-CM

## 2021-06-28 DIAGNOSIS — M79.89 MASS OF SOFT TISSUE: ICD-10-CM

## 2021-06-28 PROCEDURE — 73718 MRI LOWER EXTREMITY W/O DYE: CPT | Mod: RT | Performed by: RADIOLOGY

## 2021-06-28 PROCEDURE — 99214 OFFICE O/P EST MOD 30 MIN: CPT | Performed by: ORTHOPAEDIC SURGERY

## 2021-06-28 NOTE — LETTER
6/28/2021         RE: Manuel Goddard  [address]   [address]  [place] [address]        Dear Colleague,    Thank you for referring your patient, Manuel Goddard, to the [facility] [place]. Please see a copy of my visit note below.         [facility], Orthopaedic Oncology Surgery Consultation  by Santana Lopez M.D.     Manuel Goddard MRN# [id number]   Age: 33 year old YOB: 1985      Requesting physician: Jailene Peña       Background history:  DX:  1. Pravin-hypertrophy of the right lower extremity  2. Chronic right lower extremity lymphadenopathy     TREATMENTS:  1. Multiple right lower extremity fatty tissue debulking procedures  2. Right ankle osteophytic spur removal         Assessment and Plan:   Assessment:  33-year-old male with past medical history significant for right lower extremity hemihypertrophy with MRI findings consistent with fatty hypertrophy and infiltration.  Some bony degenerative changes as well.  We had a general discussion regarding potential malignant degeneration.  Knowledge about this condition is sparse however I would predict that although there is probably some elevated risk for him, it is small.     Plan:  He also had concerns regarding functional issues especially with his foot and toe region.  He has had prior surgical procedures performed by Dr. Jordan Cobian.  I encouraged him to continue his follow-up with Dr. Cobian to address functional problems related to his foot pain.    I suggested a follow-up visit in several years time and with MRI examination of the entire right lower extremity from hip to toe instead of only from knee to ankle.           History of Present Illness:   33 year old male with past medical history of hemihypertrophy for his right lower extremity and lymphadenopathy of unknown origin who presents to clinic today for evaluation of proximal medial calf soft  tissue swelling and prior pain.     He is doing well at the present time and is fully functional.  Is ambulating independently.  He comes in today for MRI follow-up and serial imaging comparison to previous studies.            Physical Exam:      EXAMINATION pertinent findings:   VITAL SIGNS: There were no vitals taken for this visit.  There is no height or weight on file to calculate BMI.    NEURO: grossly normal   VASCULAR: satisfactory perfusion of all extremities   MUSCULOSKELETAL:   Focused examination of his right lower extremity.  -He has generalized soft tissue hypertrophy in addition to edema from the knee down through the great toe.    A valgus knee with crepitance on range of motion from 0 to 100 degrees of flexion.  He has pseudolaxity to varus stress testing about 0 30 degrees of flexion.    His foot is perfused well without any evidence of ischemia.  He has a bulbous deformity to the right great toe.  With ambulation he has of slight plantar flexion contracture of his tibiotalar joint ambulates on the ball of his foot.           Data:   All laboratory data reviewed  All imaging studies reviewed by me      MRI examination is compared to previous study and demonstrates hemihypertrophy of the right lower extremity.  MRI study only extends from the knee to his ankle unfortunately it does not include the entire lower extremity.  However no areas of malignant degeneration appreciated.     Santana Lopez MD  [facility] Family Professor  Oncology and Adult Reconstructive Surgery  Dept Orthopaedic Surgery, [facility]  [phone] office, [phone] pager  [email or web address]      MRI report:  Final Report     Exam: MRI of the right lower extremity dated 6/28/2021.    COMPARISON: MRI 6/18/2019.    CLINICAL HISTORY: Hemihypertrophy of the right lower extremity.    TECHNIQUE: Multiplanar, multisequence MR imaging of the right lower  extremity was obtained using standard sequences in 3 orthogonal  planes  without the use of intravenous or intra-articular gadolinium contrast.    FINDINGS:    Again seen is prominent fatty tissue within the subcutaneous tissues  of the right lower extremity, along the medial aspect, asymmetric in  comparison to the left lower extremity. Within this prominent  subcutaneous fat, there are prominent vessels as well and has minimal  stranding. For example, the transverse dimension of the subcutaneous  fat in the right lower extremity proximally measures 6.2 cm in  transverse dimension, in comparison on the left where it measures 7  mm, axial series 8001 image 31.     There is soft tissue edema within the medial aspect of the right lower  extremity at the level of the ankle, not completely evaluated. Severe  osteoarthrosis of the right ankle joint, poorly evaluated with likely  hypertrophy of the medial malleolus with adjacent soft tissue edema.    Small amount of fluid in the right knee joint.    No marrow signal in the lower extremities to suggest fracture or  marrow infiltration.     Question of mild hypertrophy of the right knee medial femoral condyle.    Focal area of full-thickness cartilage fissuring and delamination  along the medial patellar facet of the left lower extremity.    IMPRESSION:  1. Prominent subcutaneous fat along the medial aspect right lower  extremity, markedly asymmetric in comparison to the left lower  extremity. Within this prominent fat, there are prominent vessels.  Findings consistent with patient's history of hemihypertrophy.  2. Severe osteoarthrosis of the right ankle tibiotalar joint with soft  tissue edema around the medial malleolus, poorly evaluated on the  images obtained due to the field-of-view.  3. No marrow signal abnormalities in the lower extremities to suggest  fracture or marrow infiltration.  4. Very subtle fatty infiltration within the musculature of the right  lower extremity in comparison to the left.  5. Unchanged masslike  nodularity deep to the right knee proximal  tibial collateral ligament..    MONICA EDOUARD MD      Principal     Name: MONICA EDOUARD  Provider ID: [id number]  Home Phone Number: [phone]    Answers for HPI/ROS submitted by the patient on 6/25/2021   General Symptoms: Yes  Skin Symptoms: No  HENT Symptoms: No  EYE SYMPTOMS: No  HEART SYMPTOMS: No  LUNG SYMPTOMS: No  INTESTINAL SYMPTOMS: No  URINARY SYMPTOMS: No  REPRODUCTIVE SYMPTOMS: No  SKELETAL SYMPTOMS: No  BLOOD SYMPTOMS: No  NERVOUS SYSTEM SYMPTOMS: No  MENTAL HEALTH SYMPTOMS: Yes  Fever: No  Loss of appetite: No  Weight loss: No  Weight gain: No  Fatigue: No  Night sweats: Yes  Chills: No  Increased stress: Yes  Excessive hunger: No  Excessive thirst: No  Feeling hot or cold when others believe the temperature is normal: No  Loss of height: No  Post-operative complications: No  Surgical site pain: Yes  Hallucinations: No  Change in or Loss of Energy: No  Hyperactivity: No  Confusion: No  Nervous or Anxious: Yes  Depression: No  Trouble sleeping: Yes  Trouble thinking or concentrating: No  Mood changes: No  Panic attacks: No

## 2021-06-28 NOTE — PROGRESS NOTES
[facility], Orthopaedic Oncology Surgery Consultation  by Santana Lopez M.D.     Manuel Goddard MRN# [id number]   Age: 33 year old YOB: 1985      Requesting physician: Jailene Peña       Background history:  DX:  1. Pravin-hypertrophy of the right lower extremity  2. Chronic right lower extremity lymphadenopathy     TREATMENTS:  1. Multiple right lower extremity fatty tissue debulking procedures  2. Right ankle osteophytic spur removal         Assessment and Plan:   Assessment:  33-year-old male with past medical history significant for right lower extremity hemihypertrophy with MRI findings consistent with fatty hypertrophy and infiltration.  Some bony degenerative changes as well.  We had a general discussion regarding potential malignant degeneration.  Knowledge about this condition is sparse however I would predict that although there is probably some elevated risk for him, it is small.     Plan:  He also had concerns regarding functional issues especially with his foot and toe region.  He has had prior surgical procedures performed by Dr. Jordan Cobian.  I encouraged him to continue his follow-up with Dr. Cobian to address functional problems related to his foot pain.    I suggested a follow-up visit in several years time and with MRI examination of the entire right lower extremity from hip to toe instead of only from knee to ankle.           History of Present Illness:   33 year old male with past medical history of hemihypertrophy for his right lower extremity and lymphadenopathy of unknown origin who presents to clinic today for evaluation of proximal medial calf soft tissue swelling and prior pain.     He is doing well at the present time and is fully functional.  Is ambulating independently.  He comes in today for MRI follow-up and serial imaging comparison to previous studies.            Physical Exam:      EXAMINATION pertinent findings:    VITAL SIGNS: There were no vitals taken for this visit.  There is no height or weight on file to calculate BMI.    NEURO: grossly normal   VASCULAR: satisfactory perfusion of all extremities   MUSCULOSKELETAL:   Focused examination of his right lower extremity.  -He has generalized soft tissue hypertrophy in addition to edema from the knee down through the great toe.    A valgus knee with crepitance on range of motion from 0 to 100 degrees of flexion.  He has pseudolaxity to varus stress testing about 0 30 degrees of flexion.    His foot is perfused well without any evidence of ischemia.  He has a bulbous deformity to the right great toe.  With ambulation he has of slight plantar flexion contracture of his tibiotalar joint ambulates on the ball of his foot.           Data:   All laboratory data reviewed  All imaging studies reviewed by me      MRI examination is compared to previous study and demonstrates hemihypertrophy of the right lower extremity.  MRI study only extends from the knee to his ankle unfortunately it does not include the entire lower extremity.  However no areas of malignant degeneration appreciated.     Santana Lopez MD  [facility] [place] Family Professor  Oncology and Adult Reconstructive Surgery  Dept Orthopaedic Surgery, [facility]  [phone] office, [phone] pager  [email or web address]      MRI report:  Final Report     Exam: MRI of the right lower extremity dated 6/28/2021.    COMPARISON: MRI 6/18/2019.    CLINICAL HISTORY: Hemihypertrophy of the right lower extremity.    TECHNIQUE: Multiplanar, multisequence MR imaging of the right lower  extremity was obtained using standard sequences in 3 orthogonal planes  without the use of intravenous or intra-articular gadolinium contrast.    FINDINGS:    Again seen is prominent fatty tissue within the subcutaneous tissues  of the right lower extremity, along the medial aspect, asymmetric in  comparison to the left lower extremity. Within this  prominent  subcutaneous fat, there are prominent vessels as well and has minimal  stranding. For example, the transverse dimension of the subcutaneous  fat in the right lower extremity proximally measures 6.2 cm in  transverse dimension, in comparison on the left where it measures 7  mm, axial series 8001 image 31.     There is soft tissue edema within the medial aspect of the right lower  extremity at the level of the ankle, not completely evaluated. Severe  osteoarthrosis of the right ankle joint, poorly evaluated with likely  hypertrophy of the medial malleolus with adjacent soft tissue edema.    Small amount of fluid in the right knee joint.    No marrow signal in the lower extremities to suggest fracture or  marrow infiltration.     Question of mild hypertrophy of the right knee medial femoral condyle.    Focal area of full-thickness cartilage fissuring and delamination  along the medial patellar facet of the left lower extremity.    IMPRESSION:  1. Prominent subcutaneous fat along the medial aspect right lower  extremity, markedly asymmetric in comparison to the left lower  extremity. Within this prominent fat, there are prominent vessels.  Findings consistent with patient's history of hemihypertrophy.  2. Severe osteoarthrosis of the right ankle tibiotalar joint with soft  tissue edema around the medial malleolus, poorly evaluated on the  images obtained due to the field-of-view.  3. No marrow signal abnormalities in the lower extremities to suggest  fracture or marrow infiltration.  4. Very subtle fatty infiltration within the musculature of the right  lower extremity in comparison to the left.  5. Unchanged masslike nodularity deep to the right knee proximal  tibial collateral ligament..    MONICA EDOUARD MD      Principal     Name: MONICA EDOUARD  Provider ID: [id number]  Home Phone Number: [phone]    Answers for HPI/ROS submitted by the patient on 6/25/2021   General Symptoms: Yes  Skin  Symptoms: No  HENT Symptoms: No  EYE SYMPTOMS: No  HEART SYMPTOMS: No  LUNG SYMPTOMS: No  INTESTINAL SYMPTOMS: No  URINARY SYMPTOMS: No  REPRODUCTIVE SYMPTOMS: No  SKELETAL SYMPTOMS: No  BLOOD SYMPTOMS: No  NERVOUS SYSTEM SYMPTOMS: No  MENTAL HEALTH SYMPTOMS: Yes  Fever: No  Loss of appetite: No  Weight loss: No  Weight gain: No  Fatigue: No  Night sweats: Yes  Chills: No  Increased stress: Yes  Excessive hunger: No  Excessive thirst: No  Feeling hot or cold when others believe the temperature is normal: No  Loss of height: No  Post-operative complications: No  Surgical site pain: Yes  Hallucinations: No  Change in or Loss of Energy: No  Hyperactivity: No  Confusion: No  Nervous or Anxious: Yes  Depression: No  Trouble sleeping: Yes  Trouble thinking or concentrating: No  Mood changes: No  Panic attacks: No

## 2021-06-28 NOTE — NURSING NOTE
Chief Complaint   Patient presents with     RECHECK     review MRI and discuss treatment plan        35 year old  1985              Pain Assessment  Patient Currently in Pain: Yes  0-10 Pain Scale: 5  Primary Pain Location: Leg(right leg and right great toe)           [facility] - [place], [place] - [address]        No Known Allergies        Current Outpatient Medications   Medication     clonazePAM (KLONOPIN) 1 MG tablet     folic acid (FOLVITE) 1 MG tablet     Multiple Vitamins-Minerals (MULTIVITAMIN ADULT PO)     No current facility-administered medications for this visit.

## 2021-09-29 ENCOUNTER — VIRTUAL VISIT (OUTPATIENT)
Dept: INTERNAL MEDICINE | Facility: CLINIC | Age: 36
End: 2021-09-29
Payer: COMMERCIAL

## 2021-09-29 DIAGNOSIS — G47.00 INSOMNIA, UNSPECIFIED TYPE: ICD-10-CM

## 2021-09-29 PROCEDURE — 99213 OFFICE O/P EST LOW 20 MIN: CPT | Mod: 95 | Performed by: INTERNAL MEDICINE

## 2021-09-29 RX ORDER — CLONAZEPAM 1 MG/1
1 TABLET ORAL
Qty: 30 TABLET | Refills: 0 | Status: SHIPPED | OUTPATIENT
Start: 2021-09-29 | End: 2022-04-21

## 2021-09-29 ASSESSMENT — ANXIETY QUESTIONNAIRES
GAD7 TOTAL SCORE: 4
4. TROUBLE RELAXING: SEVERAL DAYS
7. FEELING AFRAID AS IF SOMETHING AWFUL MIGHT HAPPEN: NOT AT ALL
5. BEING SO RESTLESS THAT IT IS HARD TO SIT STILL: NOT AT ALL
3. WORRYING TOO MUCH ABOUT DIFFERENT THINGS: SEVERAL DAYS
7. FEELING AFRAID AS IF SOMETHING AWFUL MIGHT HAPPEN: NOT AT ALL
8. IF YOU CHECKED OFF ANY PROBLEMS, HOW DIFFICULT HAVE THESE MADE IT FOR YOU TO DO YOUR WORK, TAKE CARE OF THINGS AT HOME, OR GET ALONG WITH OTHER PEOPLE?: NOT DIFFICULT AT ALL
1. FEELING NERVOUS, ANXIOUS, OR ON EDGE: SEVERAL DAYS
6. BECOMING EASILY ANNOYED OR IRRITABLE: NOT AT ALL
2. NOT BEING ABLE TO STOP OR CONTROL WORRYING: SEVERAL DAYS
GAD7 TOTAL SCORE: 4
GAD7 TOTAL SCORE: 4

## 2021-09-29 ASSESSMENT — PATIENT HEALTH QUESTIONNAIRE - PHQ9
SUM OF ALL RESPONSES TO PHQ QUESTIONS 1-9: 4
10. IF YOU CHECKED OFF ANY PROBLEMS, HOW DIFFICULT HAVE THESE PROBLEMS MADE IT FOR YOU TO DO YOUR WORK, TAKE CARE OF THINGS AT HOME, OR GET ALONG WITH OTHER PEOPLE: SOMEWHAT DIFFICULT
SUM OF ALL RESPONSES TO PHQ QUESTIONS 1-9: 4

## 2021-09-29 NOTE — PROGRESS NOTES
CC:  Med refill    HPI:  35 year old male    Recently finished stressful job, starting a new job. Having intermittent difficulty sleeping. Describes as difficulty settling thoughts when trying to fall asleep.  Has sense of dread/fretting.  No middle insomnia.  If he wake up too early in the morning, then can't get back to sleep.  Tries reading prior to going to sleep, has tried melatonin (actually makes insomnia worse), ear buds/music, exercises once per day (walk/swim). No noise interference, no sleep behavior enactments, no PLM's, sx's of PRIYANKA, no noise interference. Occasionally has drink in the evening.  No excessive alcohol use.    In 2017 he was rx'd cloazepam for insomnia which worked well.  He hasn't had to use it in the interim, and back then would use it on average once per month. No red flags. Reminded him to not combine the rx with other sedatives or alcohol.  Don't drive or engage in other activities requiring alertness if drowsy.   Reviewed PEARL and PHQ responses (see below)    PEARL-7   [employer], 2002; Used with Permission) 9/29/2021   1. Feeling nervous, anxious, or on edge Several days   2. Not being able to stop or control worrying Several days   3. Worrying too much about different things Several days   4. Trouble relaxing Several days   5. Being so restless that it is hard to sit still Not at all   6. Becoming easily annoyed or irritable Not at all   7. Feeling afraid, as if something awful might happen Not at all   PEARL 7 TOTAL SCORE 4 (minimal anxiety)   1. Feeling nervous, anxious, or on edge 1   2. Not being able to stop or control worrying 1   3. Worrying too much about different things 1   4. Trouble relaxing 1   5. Being so restless that it is hard to sit still 0   6. Becoming easily annoyed or irritable 0   7. Feeling afraid, as if something awful might happen 0   PEARL-7 Total Score 4   If you checked any problems, how difficult have they made it for you to do your work, take care of things  at home, or get along with other people?      PHQ-9 ([employer]) 9/29/2021   1.  Little interest or pleasure in doing things 1   2.  Feeling down, depressed, or hopeless 1   3.  Trouble falling or staying asleep, or sleeping too much 0   4.  Feeling tired or having little energy 0   5.  Poor appetite or overeating 0   6.  Feeling bad about yourself 1   7.  Trouble concentrating 1   8.  Moving slowly or restless 0   9.  Suicidal or self-harm thoughts 0   PHQ-9 Total Score 4   Difficulty at work, home, or with people    1.  Little interest or pleasure in doing things Several days   2.  Feeling down, depressed, or hopeless Several days   3.  Trouble falling or staying asleep, or sleeping too much Not at all   4.  Feeling tired or having little energy Not at all   5.  Poor appetite or overeating Not at all   6.  Feeling bad about yourself Several days   7.  Trouble concentrating Several days   8.  Moving slowly or restless Not at all   9.  Suicidal or self-harm thoughts Not at all   PHQ-9 via [employer] TOTAL SCORE-----> 4 (Minimal depression)   Difficulty at work, home, or with people Somewhat difficult     Answers for HPI/ROS submitted by the patient on 9/29/2021  If you checked off any problems, how difficult have these problems made it for you to do your work, take care of things at home, or get along with other people?: Somewhat difficult  PHQ9 TOTAL SCORE: 4  PEARL 7 TOTAL SCORE: 4      Patient Active Problem List   Diagnosis     Hypertrophy of bone of lower leg     Lymphedema of right lower extremity     Anxiety     Social anxiety disorder     Tinea corporis     Current Outpatient Medications   Medication Sig Dispense Refill     Multiple Vitamins-Minerals (MULTIVITAMIN ADULT PO)        clonazePAM (KLONOPIN) 1 MG tablet Take 1 tablet (1 mg) by mouth nightly as needed for anxiety (Patient not taking: Reported on 6/14/2019) 30 tablet 2     No Known Allergies  BP Readings from Last 6 Encounters:   05/04/21 111/71   04/06/21  "121/77   09/13/19 103/68   06/14/19 112/70   09/19/17 108/76   09/08/17 128/82     Wt Readings from Last 5 Encounters:   05/04/21 71 kg (156 lb 8 oz)   04/06/21 69.8 kg (153 lb 12.8 oz)   09/13/19 71.3 kg (157 lb 3.2 oz)   06/14/19 68.9 kg (151 lb 14.4 oz)   09/19/17 68.9 kg (151 lb 14.4 oz)     Estimated body mass index is 21.77 kg/m  as calculated from the following:    Height as of 5/4/21: 1.806 m (5' 11.1\").    Weight as of 5/4/21: 71 kg (156 lb 8 oz).  General:  Alert, appears generally healthy, not pale, range of affect within normal limits, breathing comfortably, actively engaged in conversation. No cough, wheeze, shortness of breath. EOM's intact, no facial assymetry, no dysarthria.    Manuel was seen today for recheck medication.    Diagnoses and all orders for this visit:    Insomnia, unspecified type  -     clonazePAM (KLONOPIN) 1 MG tablet; Take 1 tablet (1 mg) by mouth nightly as needed for anxiety or sleep      F/U with Dr. Ivory as needed.    Uzma Guerrero M.D.  Internal Medicine  [facility]     Patients: if you have questions or concerns about this progress note, please discuss them with the provider at a future office visit.    "

## 2021-09-29 NOTE — NURSING NOTE
Chief Complaint   Patient presents with     Recheck Medication     Brant Concepcion, EMT at 8:48 AM on 9/29/2021

## 2021-09-29 NOTE — PROGRESS NOTES
"This patient is being evaluated via a billable video visit as an alternative to an in-person visit.       The patient has been notified of following:     \"This video visit will be conducted via a call between you and your physician/provider. We have found that certain health care needs can be provided without the need for an in-person physical exam.  This service lets us provide the care you need with a video conversation.  If a prescription is necessary we can send it directly to your pharmacy.  If lab work is needed we can place an order for that and you can then stop by our lab to have the test done at a later time. If during the course of the call the physician/provider feels a video visit is not appropriate, you will not be charged for this service.\"     Patient has given verbal consent for virtual video visit? Yes  Did patient initiate this virtual visit? Yes    Person spoken to:  Patient    This was a synchronous virtual visit  Location of patient: home  Location of physician:  home office  Department name:  Medicine  Mode of communication:  Video Conference via [employer]    Time video initiated:  9:12 a.m.  Time video ended:  9:30 a.m.  Total length of video visit: 18 min    Uzma Guerrero M.D.  Internal Medicine  [facility]       Patients: if you have questions or concerns about this progress note, please discuss them with the provider at a future office visit.    "

## 2021-09-30 ASSESSMENT — ANXIETY QUESTIONNAIRES: GAD7 TOTAL SCORE: 4

## 2021-10-03 ENCOUNTER — HEALTH MAINTENANCE LETTER (OUTPATIENT)
Age: 36
End: 2021-10-03

## 2022-01-04 ENCOUNTER — TELEPHONE (OUTPATIENT)
Dept: SURGERY | Facility: CLINIC | Age: 37
End: 2022-01-04
Payer: COMMERCIAL

## 2022-01-04 NOTE — TELEPHONE ENCOUNTER
[facility] Call Center    Phone Message    May a detailed message be left on voicemail: yes     Reason for Call: Other: Online request - pt sent a request to be seen for Rectal polyps / growths       -Pt is scheduled for an opening 1/5 @ 4PM with Dr. Valdez.     Sending TE for review per guidelines. Thank you!     Action Taken: Message routed to:  [facility] ([facility] Colorectal     Travel Screening: Not Applicable

## 2022-01-05 NOTE — TELEPHONE ENCOUNTER
"I called patient to make sure that he was properly scheduled for his clinic appt. He is looking to be seen for what sounds like skin tags. He has non painful, nonbleeding \"extra skin\" on the outside of the anus. He does not have a rectal polyp. Kept appt for 3/30/2022  "

## 2022-01-06 NOTE — TELEPHONE ENCOUNTER
Diagnosis, Referred by & from: Possible skin tags   Appt date: 3/30/2022   NOTES STATUS DETAILS   OFFICE NOTE from referring provider  N/A    OFFICE NOTE from other specialist   Care Everywhere [facility]:  8/6/18 - DERM OV with Dr. Wilson   DISCHARGE SUMMARY from hospital  N/A    DISCHARGE REPORT from the ER N/A    OPERATIVE REPORT  N/A    MEDICATION LIST Internal    LABS N/A    DIAGNOSTIC PROCEDURES N/A    IMAGING (DISC & REPORT) N/A

## 2022-01-23 ENCOUNTER — HEALTH MAINTENANCE LETTER (OUTPATIENT)
Age: 37
End: 2022-01-23

## 2022-03-29 ASSESSMENT — ENCOUNTER SYMPTOMS
ARTHRALGIAS: 1
BACK PAIN: 0
STIFFNESS: 1
NECK PAIN: 0
MUSCLE WEAKNESS: 0
MUSCLE CRAMPS: 0
JOINT SWELLING: 1
MYALGIAS: 0

## 2022-03-29 NOTE — PROGRESS NOTES
"Colon and Rectal Surgery Consult Clinic Note    Referring provider:  Referred Self, MD  No address on file       RE: Manuel Goddard  : 1985  ASH: 3/30/2022      Manuel Goddard is a very pleasant 36 year old male who presents today for anal skin tags.    HISTORY OF PRESENT ILNESS:  Alcides has noticed a \"fleshy protuberance\" from his anus in the past 1 to 2 months.  He does not remember noticing this in the past.  He denies any pain or bleeding associated with it.  He does not have any difficulty with bowel movements.  He does have a history of anal warts in the past.  He does not smoke.  He has anal receptive intercourse.  He is otherwise healthy and denies any immune suppression.    PLEASE SEE NOTE BELOW FOR PHYSICAL EXAMINATION, REVIEW OF SYSTEMS, AND OTHER HISTORY.    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  No evidence of recurrent warts on exam today.  I did recommend anal Pap given his history of anal warts and anal receptive intercourse.  This was obtained today.  If normal, can repeat in 1 year.  If abnormal, we will have him return to clinic for high-resolution anoscopy with Jannie Peoples NP.  We discussed management of the skin tag with mucosal prolapse.  If this is not significantly bothersome, no further intervention is necessary.  However, if it is bothersome, we could try hemorrhoid banding to pexy up the tissue to see if this reduces the amount of mucosal prolapse.  He would like to think about this more and will return to clinic if he wishes to try banding.      20 minutes spent on the date of encounter (excluding time performing procedures with or without biopsy) performing chart review, history and exam, documentation and further activities as noted above.    Hamida Valdez MD  Colon and Rectal Surgery Staff  [facility]      -------------------------------------------------------------------------------------------------------------------          Medical history:  Past Medical History:   Diagnosis Date     Chronic osteoarthritis      Degenerative joint disease      Hypertrophy of bone of lower leg     see details in problem list     Other neutropenia (H)        Surgical history:  Past Surgical History:   Procedure Laterality Date     KNEE SURGERY       leg surgeries      5-6 debulking surgeries of R lower leg and stunding of the growth plate       Problem list:    Patient Active Problem List    Diagnosis Date Noted     Tinea corporis 05/04/2021     Priority: Medium     Anxiety 04/11/2018     Priority: Medium     Social anxiety disorder 04/11/2018     Priority: Medium     Lymphedema of right lower extremity 09/19/2017     Priority: Medium     Hypertrophy of bone of lower leg 06/17/2015     Priority: Medium     Since birth, hypertrophy of R leg/foot.  Mostly hypertrophy of fatty tissue -- ?benign lipoma; however, required mult debulking surgeries and stunting of the growth plate.         Medications:  Current Outpatient Medications   Medication Sig Dispense Refill     clonazePAM (KLONOPIN) 1 MG tablet Take 1 tablet (1 mg) by mouth nightly as needed for anxiety or sleep 30 tablet 0     emtricitabine-tenofovir (TRUVADA) 200-300 MG per tablet        Multiple Vitamins-Minerals (MULTIVITAMIN ADULT PO)          Allergies:  No Known Allergies    Family history:  Family History   Problem Relation Age of Onset     Cerebrovascular Disease Maternal Grandfather         late 70's     Thyroid Cancer Mother      Cancer Mother         Thyroid Cancer     Autoimmune Disease Mother      Diabetes Paternal Aunt         ?DMI     Diabetes Cousin         ?DMI     Diabetes Cousin        Social history:  Social History     Socioeconomic History     Marital status: Single     Spouse name: Not on file     Number of children: Not on file     Years of  "education: Not on file     Highest education level: Not on file   Occupational History     Not on file   Tobacco Use     Smoking status: Never Smoker     Smokeless tobacco: Never Used   Substance and Sexual Activity     Alcohol use: Yes     Alcohol/week: 2.5 - 5.0 standard drinks     Drug use: No     Sexual activity: Yes     Partners: Male   Other Topics Concern     Not on file   Social History Narrative     Not on file     Social Determinants of Health     Financial Resource Strain: Not on file   Food Insecurity: Not on file   Transportation Needs: Not on file   Physical Activity: Not on file   Stress: Not on file   Social Connections: Not on file   Intimate Partner Violence: Not on file   Housing Stability: Not on file         Nursing Notes:   Paula Hoskins  3/30/2022  4:43 PM  Signed  Chief Complaint   Patient presents with     New Patient     Anal Skin Tags       Vitals:    03/30/22 1641   BP: 123/82   BP Location: Left arm   Patient Position: Sitting   Cuff Size: Adult Regular   Pulse: (!) 45   SpO2: 100%   Weight: 156 lb 6.4 oz   Height: 5' 11\"       Body mass index is 21.81 kg/m .    Paula Hoskins CMA         Physical Examination:  /82 (BP Location: Left arm, Patient Position: Sitting, Cuff Size: Adult Regular)   Pulse (!) 45   Ht 1.803 m (5' 11\")   Wt 70.9 kg (156 lb 6.4 oz)   SpO2 100%   BMI 21.81 kg/m    General: alert, oriented, in no acute distress, sitting comfortably  HEENT: mucous membranes moist  Perianal external examination:  Perianal skin: Intact with no excoriation or lichenification.  Lesions: No evidence of an external lesion, nodularity, or induration in the perianal region.  Eversion of buttocks: There was not evidence of an anal fissure. Details: N/A.  Skin tags or external hemorrhoids: Yes: small skin tag in the anterior/right anterior position with small mucosal prolapse.  Digital rectal examination: Was performed.   Sphincter tone: Good.  Palpable lesions: No.  Prostate: " Normal.  Other: None..    Anoscopy: Was performed.   Hemorrhoids: Yes. Small internal hemorrhoids without active bleeding  Lesions: No.

## 2022-03-30 ENCOUNTER — PRE VISIT (OUTPATIENT)
Dept: SURGERY | Facility: CLINIC | Age: 37
End: 2022-03-30

## 2022-03-30 ENCOUNTER — OFFICE VISIT (OUTPATIENT)
Dept: SURGERY | Facility: CLINIC | Age: 37
End: 2022-03-30
Payer: COMMERCIAL

## 2022-03-30 VITALS
SYSTOLIC BLOOD PRESSURE: 123 MMHG | BODY MASS INDEX: 21.9 KG/M2 | HEIGHT: 71 IN | HEART RATE: 45 BPM | DIASTOLIC BLOOD PRESSURE: 82 MMHG | OXYGEN SATURATION: 100 % | WEIGHT: 156.4 LBS

## 2022-03-30 DIAGNOSIS — A63.0 ANAL CONDYLOMA: Primary | ICD-10-CM

## 2022-03-30 DIAGNOSIS — K64.4 ANAL SKIN TAG: ICD-10-CM

## 2022-03-30 DIAGNOSIS — K64.8 HEMORRHOID PROLAPSE: ICD-10-CM

## 2022-03-30 LAB
LAB DIRECTOR COMMENTS: ABNORMAL
LAB DIRECTOR DISCLAIMER: ABNORMAL
LAB DIRECTOR INTERPRETATION: ABNORMAL
LAB DIRECTOR METHODOLOGY: ABNORMAL
LAB DIRECTOR RESULTS: ABNORMAL
SPECIMEN DESCRIPTION: ABNORMAL

## 2022-03-30 PROCEDURE — 88112 CYTOPATH CELL ENHANCE TECH: CPT | Mod: TC | Performed by: COLON & RECTAL SURGERY

## 2022-03-30 PROCEDURE — 88112 CYTOPATH CELL ENHANCE TECH: CPT | Mod: 26 | Performed by: PATHOLOGY

## 2022-03-30 PROCEDURE — G0452 MOLECULAR PATHOLOGY INTERPR: HCPCS | Mod: 26 | Performed by: PATHOLOGY

## 2022-03-30 PROCEDURE — 46600 DIAGNOSTIC ANOSCOPY SPX: CPT | Performed by: COLON & RECTAL SURGERY

## 2022-03-30 PROCEDURE — 87624 HPV HI-RISK TYP POOLED RSLT: CPT | Performed by: COLON & RECTAL SURGERY

## 2022-03-30 PROCEDURE — 99203 OFFICE O/P NEW LOW 30 MIN: CPT | Mod: 25 | Performed by: COLON & RECTAL SURGERY

## 2022-03-30 RX ORDER — EMTRICITABINE AND TENOFOVIR DISOPROXIL FUMARATE 200; 300 MG/1; MG/1
TABLET, FILM COATED ORAL
COMMUNITY
End: 2022-04-22

## 2022-03-30 ASSESSMENT — PAIN SCALES - GENERAL: PAINLEVEL: NO PAIN (0)

## 2022-03-30 NOTE — NURSING NOTE
"Chief Complaint   Patient presents with     New Patient     Anal Skin Tags       Vitals:    03/30/22 1641   BP: 123/82   BP Location: Left arm   Patient Position: Sitting   Cuff Size: Adult Regular   Pulse: (!) 45   SpO2: 100%   Weight: 156 lb 6.4 oz   Height: 5' 11\"       Body mass index is 21.81 kg/m .    Paula Hoskins CMA    " ROS: Constitutional- +fever, +chills.  Respiratory- No cough, No SOB  Cardiac- no chest pain, no palpitations, ENT- +rhinorrhea, +sore throat, +congestion. No loss of sense of smell or taste. Abdomen- No nausea, no vomiting, no diarrhea.  Urinary- no dysuria, no urgency, no frequency.  Skin- No rashes ~ Ye Ng PA-C

## 2022-03-30 NOTE — LETTER
"3/30/2022       RE: Manuel Goddard  [address]  [address]  [facility] [address]     Dear Colleague,    Thank you for referring your patient, Manuel Goddard, to the [facility] COLON AND RECTAL SURGERY CLINIC [place] at [facility]. Please see a copy of my visit note below.    Colon and Rectal Surgery Consult Clinic Note    Referring provider:  Referred Self, MD  No address on file       RE: Manuel Goddard  : 1985  ASH: 3/30/2022      Manuel Goddard is a very pleasant 36 year old male who presents today for anal skin tags.    HISTORY OF PRESENT ILNESS:  Alcides has noticed a \"fleshy protuberance\" from his anus in the past 1 to 2 months.  He does not remember noticing this in the past.  He denies any pain or bleeding associated with it.  He does not have any difficulty with bowel movements.  He does have a history of anal warts in the past.  He does not smoke.  He has anal receptive intercourse.  He is otherwise healthy and denies any immune suppression.    PLEASE SEE NOTE BELOW FOR PHYSICAL EXAMINATION, REVIEW OF SYSTEMS, AND OTHER HISTORY.    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  No evidence of recurrent warts on exam today.  I did recommend anal Pap given his history of anal warts and anal receptive intercourse.  This was obtained today.  If normal, can repeat in 1 year.  If abnormal, we will have him return to clinic for high-resolution anoscopy with Jannie Peoples NP.  We discussed management of the skin tag with mucosal prolapse.  If this is not significantly bothersome, no further intervention is necessary.  However, if it is bothersome, we could try hemorrhoid banding to pexy up the tissue to see if this reduces the amount of mucosal prolapse.  He would like to think about this more and will return to clinic if he wishes to try banding.      20 " minutes spent on the date of encounter (excluding time performing procedures with or without biopsy) performing chart review, history and exam, documentation and further activities as noted above.    Hamida Valdez MD  Colon and Rectal Surgery Staff  [facility]      -------------------------------------------------------------------------------------------------------------------          Medical history:  Past Medical History:   Diagnosis Date     Chronic osteoarthritis      Degenerative joint disease      Hypertrophy of bone of lower leg     see details in problem list     Other neutropenia (H)        Surgical history:  Past Surgical History:   Procedure Laterality Date     KNEE SURGERY       leg surgeries      5-6 debulking surgeries of R lower leg and stunding of the growth plate       Problem list:    Patient Active Problem List    Diagnosis Date Noted     Tinea corporis 05/04/2021     Priority: Medium     Anxiety 04/11/2018     Priority: Medium     Social anxiety disorder 04/11/2018     Priority: Medium     Lymphedema of right lower extremity 09/19/2017     Priority: Medium     Hypertrophy of bone of lower leg 06/17/2015     Priority: Medium     Since birth, hypertrophy of R leg/foot.  Mostly hypertrophy of fatty tissue -- ?benign lipoma; however, required mult debulking surgeries and stunting of the growth plate.         Medications:  Current Outpatient Medications   Medication Sig Dispense Refill     clonazePAM (KLONOPIN) 1 MG tablet Take 1 tablet (1 mg) by mouth nightly as needed for anxiety or sleep 30 tablet 0     emtricitabine-tenofovir (TRUVADA) 200-300 MG per tablet        Multiple Vitamins-Minerals (MULTIVITAMIN ADULT PO)          Allergies:  No Known Allergies    Family history:  Family History   Problem Relation Age of Onset     Cerebrovascular Disease Maternal Grandfather         late 70's     Thyroid Cancer Mother      Cancer Mother         Thyroid Cancer      "Autoimmune Disease Mother      Diabetes Paternal Aunt         ?DMI     Diabetes Cousin         ?DMI     Diabetes Cousin        Social history:  Social History     Socioeconomic History     Marital status: Single     Spouse name: Not on file     Number of children: Not on file     Years of education: Not on file     Highest education level: Not on file   Occupational History     Not on file   Tobacco Use     Smoking status: Never Smoker     Smokeless tobacco: Never Used   Substance and Sexual Activity     Alcohol use: Yes     Alcohol/week: 2.5 - 5.0 standard drinks     Drug use: No     Sexual activity: Yes     Partners: Male   Other Topics Concern     Not on file   Social History Narrative     Not on file     Social Determinants of Health     Financial Resource Strain: Not on file   Food Insecurity: Not on file   Transportation Needs: Not on file   Physical Activity: Not on file   Stress: Not on file   Social Connections: Not on file   Intimate Partner Violence: Not on file   Housing Stability: Not on file         Nursing Notes:   Paula Hoskins  3/30/2022  4:43 PM  Signed  Chief Complaint   Patient presents with     New Patient     Anal Skin Tags       Vitals:    03/30/22 1641   BP: 123/82   BP Location: Left arm   Patient Position: Sitting   Cuff Size: Adult Regular   Pulse: (!) 45   SpO2: 100%   Weight: 156 lb 6.4 oz   Height: 5' 11\"       Body mass index is 21.81 kg/m .    Paula Hoskins CMA         Physical Examination:  /82 (BP Location: Left arm, Patient Position: Sitting, Cuff Size: Adult Regular)   Pulse (!) 45   Ht 1.803 m (5' 11\")   Wt 70.9 kg (156 lb 6.4 oz)   SpO2 100%   BMI 21.81 kg/m    General: alert, oriented, in no acute distress, sitting comfortably  HEENT: mucous membranes moist  Perianal external examination:  Perianal skin: Intact with no excoriation or lichenification.  Lesions: No evidence of an external lesion, nodularity, or induration in the perianal region.  Eversion of " buttocks: There was not evidence of an anal fissure. Details: N/A.  Skin tags or external hemorrhoids: Yes: small skin tag in the anterior/right anterior position with small mucosal prolapse.  Digital rectal examination: Was performed.   Sphincter tone: Good.  Palpable lesions: No.  Prostate: Normal.  Other: None..    Anoscopy: Was performed.   Hemorrhoids: Yes. Small internal hemorrhoids without active bleeding  Lesions: No.        Hamida Valdez MD

## 2022-04-01 LAB
PATH REPORT.COMMENTS IMP SPEC: ABNORMAL
PATH REPORT.COMMENTS IMP SPEC: YES
PATH REPORT.FINAL DX SPEC: ABNORMAL
PATH REPORT.GROSS SPEC: ABNORMAL
PATH REPORT.RELEVANT HX SPEC: ABNORMAL

## 2022-04-05 ENCOUNTER — PATIENT OUTREACH (OUTPATIENT)
Dept: SURGERY | Facility: CLINIC | Age: 37
End: 2022-04-05
Payer: COMMERCIAL

## 2022-04-05 NOTE — PROGRESS NOTES
Anal pap: Atypical squamous cells of undetermined significance, cannot exclude high-grade squamous intraepithelial lesion     Discussed results of anal pap and explained that we would reccommended a procedure called HRA. Scheduled HRA.

## 2022-04-07 ENCOUNTER — OFFICE VISIT (OUTPATIENT)
Dept: SURGERY | Facility: CLINIC | Age: 37
End: 2022-04-07
Payer: COMMERCIAL

## 2022-04-07 VITALS
BODY MASS INDEX: 21.91 KG/M2 | SYSTOLIC BLOOD PRESSURE: 104 MMHG | WEIGHT: 156.5 LBS | HEIGHT: 71 IN | DIASTOLIC BLOOD PRESSURE: 64 MMHG | OXYGEN SATURATION: 98 % | HEART RATE: 58 BPM

## 2022-04-07 DIAGNOSIS — K64.8 HEMORRHOID PROLAPSE: Primary | ICD-10-CM

## 2022-04-07 DIAGNOSIS — R85.619 ABNORMAL PAP SMEAR OF ANUS: ICD-10-CM

## 2022-04-07 PROCEDURE — 46221 LIGATION OF HEMORRHOID(S): CPT | Performed by: NURSE PRACTITIONER

## 2022-04-07 ASSESSMENT — PAIN SCALES - GENERAL: PAINLEVEL: NO PAIN (0)

## 2022-04-07 NOTE — LETTER
2022       RE: Manuel Goddard  [address]  [facility] [address]     Dear Colleague,    Thank you for referring your patient, Manuel Goddard, to the [facility] COLON AND RECTAL SURGERY CLINIC [place] at [facility]. Please see a copy of my visit note below.      Colon and Rectal Surgery Clinic High Resolution Anoscopy Note    RE: Manuel Goddard  : 1985  ASH: 2022      Manuel Goddard is a 36 year old male with a history of anal condyloma and ASCUS on anal Pap who presents today for high resolution anoscopy.     HPI: Alcides has been doing well. He did decide that the mucosal prolapse is bothersome and he would like to try hemorrhoid banding as previously discussed with Dr. Valdez.     ASSESSMENT: Written, informed consent was obtained prior to procedure.  Prior to the start of the procedure and with procedural staff participation, I verbally confirmed the patient s identity using two indicators, relevant allergies, that the procedure was appropriate and matched the consent or emergent situation, and that the correct equipment/implants were available. Immediately prior to starting the procedure I conducted the Time Out with the procedural staff and re-confirmed the patient s name, procedure, and site/side. (The Joint Commission universal protocol was followed.)  Yes    Sedation (Moderate or Deep): None    Anal cytology was not obtained. Digital anal rectal exam was performed with no palpable lesions. Dilute acetic acid soak was completed for 2 minutes. Lubricant was used to insert the anoscope. Performed high resolution microscopy using the colposcope. The dentate line was viewed in its entirety. Mild acetowhitening with striations in the distal anal canal but no bright acetowhitening or punctation. No verruciform lesions.   The perianal area was inspected after acetic acid soak. The findings noted were no external  lesions. Small skin tag in the anterior midline with mucosal prolapse.     After discussing the risks and benefits, the patient agreed to proceed with internal hemorrhoidal banding.  A suction hemorrhoidal  was used to place a total of 1 band(s) in the anterior position(s).  There was no significant bleeding. The patient tolerated the procedure well.  The patient tolerated the procedures well.    PLAN: No evidence of high grade dysplasia. Repeat high resolution anoscopy in one year. If this is again normal, can follow with annual anal cytology. Patient's questions were answered to his stated satisfaction and he is in agreement with this plan.    For details of past medical history, surgical history, family history, medications, allergies, and review of systems, please see details below.    Medical history:  Past Medical History:   Diagnosis Date     Chronic osteoarthritis      Degenerative joint disease      Hypertrophy of bone of lower leg     see details in problem list     Other neutropenia (H)        Surgical history:  Past Surgical History:   Procedure Laterality Date     KNEE SURGERY       leg surgeries      5-6 debulking surgeries of R lower leg and stunding of the growth plate       Family history:  Family History   Problem Relation Age of Onset     Cerebrovascular Disease Maternal Grandfather         late 70's     Thyroid Cancer Mother      Cancer Mother         Thyroid Cancer     Autoimmune Disease Mother      Diabetes Paternal Aunt         ?DMI     Diabetes Cousin         ?DMI     Diabetes Cousin        Medications:  Current Outpatient Medications   Medication Sig Dispense Refill     clonazePAM (KLONOPIN) 1 MG tablet Take 1 tablet (1 mg) by mouth nightly as needed for anxiety or sleep 30 tablet 0     emtricitabine-tenofovir (TRUVADA) 200-300 MG per tablet        Multiple Vitamins-Minerals (MULTIVITAMIN ADULT PO)        Allergies:  The patienthas No Known Allergies.    Social history:  Social History  "    Tobacco Use     Smoking status: Never Smoker     Smokeless tobacco: Never Used   Substance Use Topics     Alcohol use: Yes     Alcohol/week: 2.5 - 5.0 standard drinks     Marital status: single.    Review of Systems:  Nursing Notes:   Paula Hoskins  4/7/2022  3:00 PM  Signed  Chief Complaint   Patient presents with     Consult     New HRA        Vitals:    04/07/22 1457   BP: 104/64   BP Location: Left arm   Patient Position: Sitting   Cuff Size: Adult Regular   Pulse: 58   SpO2: 98%   Weight: 156 lb 8 oz   Height: 5' 11\"       Body mass index is 21.83 kg/m .    Paula Hoskins CMA         This procedure was performed under a collaborative agreement with Dr. Portillo Bolden MD, Chief of Colon and Rectal Surgery, [facility].    Jannie Peoples NP-C  Colon and Rectal Surgery  [facility]      This note was created using speech recognition software and may contain unintended word substitutions.  "

## 2022-04-07 NOTE — NURSING NOTE
"Chief Complaint   Patient presents with     Consult     New HRA        Vitals:    04/07/22 1457   BP: 104/64   BP Location: Left arm   Patient Position: Sitting   Cuff Size: Adult Regular   Pulse: 58   SpO2: 98%   Weight: 156 lb 8 oz   Height: 5' 11\"       Body mass index is 21.83 kg/m .    Paula Hoskins CMA    "

## 2022-04-07 NOTE — PROGRESS NOTES
Colon and Rectal Surgery Clinic High Resolution Anoscopy Note    RE: Manuel Goddard  : 1985  ASH: 2022      Manuel Goddard is a 36 year old male with a history of anal condyloma and ASCUS on anal Pap who presents today for high resolution anoscopy.     HPI: Alcides has been doing well. He did decide that the mucosal prolapse is bothersome and he would like to try hemorrhoid banding as previously discussed with Dr. Valdez.     ASSESSMENT: Written, informed consent was obtained prior to procedure.  Prior to the start of the procedure and with procedural staff participation, I verbally confirmed the patient s identity using two indicators, relevant allergies, that the procedure was appropriate and matched the consent or emergent situation, and that the correct equipment/implants were available. Immediately prior to starting the procedure I conducted the Time Out with the procedural staff and re-confirmed the patient s name, procedure, and site/side. (The Joint Commission universal protocol was followed.)  Yes    Sedation (Moderate or Deep): None    Anal cytology was not obtained. Digital anal rectal exam was performed with no palpable lesions. Dilute acetic acid soak was completed for 2 minutes. Lubricant was used to insert the anoscope. Performed high resolution microscopy using the colposcope. The dentate line was viewed in its entirety. Mild acetowhitening with striations in the distal anal canal but no bright acetowhitening or punctation. No verruciform lesions.   The perianal area was inspected after acetic acid soak. The findings noted were no external lesions. Small skin tag in the anterior midline with mucosal prolapse.     After discussing the risks and benefits, the patient agreed to proceed with internal hemorrhoidal banding.  A suction hemorrhoidal  was used to place a total of 1 band(s) in the anterior position(s).  There was no significant bleeding. The patient tolerated  the procedure well.  The patient tolerated the procedures well.    PLAN: No evidence of high grade dysplasia. Repeat high resolution anoscopy in one year. If this is again normal, can follow with annual anal cytology. Patient's questions were answered to his stated satisfaction and he is in agreement with this plan.    For details of past medical history, surgical history, family history, medications, allergies, and review of systems, please see details below.    Medical history:  Past Medical History:   Diagnosis Date     Chronic osteoarthritis      Degenerative joint disease      Hypertrophy of bone of lower leg     see details in problem list     Other neutropenia (H)        Surgical history:  Past Surgical History:   Procedure Laterality Date     KNEE SURGERY       leg surgeries      5-6 debulking surgeries of R lower leg and stunding of the growth plate       Family history:  Family History   Problem Relation Age of Onset     Cerebrovascular Disease Maternal Grandfather         late 70's     Thyroid Cancer Mother      Cancer Mother         Thyroid Cancer     Autoimmune Disease Mother      Diabetes Paternal Aunt         ?DMI     Diabetes Cousin         ?DMI     Diabetes Cousin        Medications:  Current Outpatient Medications   Medication Sig Dispense Refill     clonazePAM (KLONOPIN) 1 MG tablet Take 1 tablet (1 mg) by mouth nightly as needed for anxiety or sleep 30 tablet 0     emtricitabine-tenofovir (TRUVADA) 200-300 MG per tablet        Multiple Vitamins-Minerals (MULTIVITAMIN ADULT PO)        Allergies:  The patienthas No Known Allergies.    Social history:  Social History     Tobacco Use     Smoking status: Never Smoker     Smokeless tobacco: Never Used   Substance Use Topics     Alcohol use: Yes     Alcohol/week: 2.5 - 5.0 standard drinks     Marital status: single.    Review of Systems:  Nursing Notes:   Paula Hoskins  4/7/2022  3:00 PM  Signed  Chief Complaint   Patient presents with     Consult      "New HRA        Vitals:    04/07/22 1457   BP: 104/64   BP Location: Left arm   Patient Position: Sitting   Cuff Size: Adult Regular   Pulse: 58   SpO2: 98%   Weight: 156 lb 8 oz   Height: 5' 11\"       Body mass index is 21.83 kg/m .    Paula Hoskins CMA         This procedure was performed under a collaborative agreement with Dr. Portillo Bolden MD, Chief of Colon and Rectal Surgery, [facility].    Jannie Peoples NP-C  Colon and Rectal Surgery  [facility]      This note was created using speech recognition software and may contain unintended word substitutions.    "

## 2022-04-19 NOTE — PROGRESS NOTES
ASSESSMENT AND PLAN:     COUNSELING:   Reviewed preventive health counseling, as reflected in patient instructions       Regular exercise       Immunizations    Vaccinated for: Human Papillomavirus         Safe sex practices/STD prevention       Consider Hep C screening for all patients one time for ages 18-79 years       Syphilis screening for high risk patients        HIV screeninx in teen years, 1x in adult years, and at intervals if high risk       PrEP retrovirus therapy for HIV prevention     (Z00.00) Visit for well man health check  (primary encounter diagnosis)  Comment: Age appropriate screening and preventive services provided.   Plan:     (Z11.59) Need for hepatitis C screening test  Plan: Hepatitis C Screen Reflex to HCV RNA Quant and         Genotype          (R85.81) Anal high risk HPV DNA test positive  (Z23) Need for vaccination  Comment: Discussed uncertain magnitude of benefits of HPV vaccination at age 36 with known warts/HPV. Alcides wishes to proceed with vaccination - first dose given today.   Plan: HPV9          (Z11.3) Routine screening for STI (sexually transmitted infection)  Comment:   Plan: Treponema Abs w Reflex to RPR and Titer, HIV         Antigen Antibody Combo          (G47.00) Insomnia, unspecified type  Comment: Chronic, stable. Infrequent use of clonazepam for anxiety-related insomnia. We discussed risks of long-term frequent use of benzodiazepines. If use ever starts to escalate, we'll readdress alternatives for insomnia.  CSA signed today and will be scanned to chart.   Plan: clonazePAM (KLONOPIN) 1 MG tablet    (Z20.6) Contact with and (suspected) exposure to human immunodeficiency virus (hiv)  Comment: Doing well on PrEP. Update labs. Plan to refill Truvada.   Plan: HIV Antigen Antibody Combo, Hepatitis B core         antibody, Hepatitis B Surface Antibody,         Hepatitis B surface antigen, Creatinine          Follow up q3 months for PrEP lab visits.  Follow up with me 1  "year for annual wellness.       Aries Swain MD   [facility]  04/21/2022, 9:39 AM      SUBJECTIVE:   Alcides is a 36 year old male who presents to clinic today to establish care and for annual wellness exam.    # Leg Asymmetry  Since birth, hypertrophy of right leg/foot.  Mostly hypertrophy of fatty tissue - required mult debulking surgeries and stunting of the growth plate.  - has also had surgeries for ankle spurs, toe  - has pain mostly in ankle, sometimes in knee, stable  - mostly used to it, walking sometimes painful    - saw Dr. Lopez with ortho oncology 6/28/21:  \"33-year-old male with past medical history significant for right lower extremity hemihypertrophy with MRI findings consistent with fatty hypertrophy and infiltration.  Some bony degenerative changes as well.  We had a general discussion regarding potential malignant degeneration.  Knowledge about this condition is sparse however I would predict that although there is probably some elevated risk for him, it is small.    Plan:  He also had concerns regarding functional issues especially with his foot and toe region.  He has had prior surgical procedures performed by Dr. Jordan Cobian.  I encouraged him to continue his follow-up with Dr. Cobian to address functional problems related to his foot pain.     I suggested a follow-up visit in several years time and with MRI examination of the entire right lower extremity from hip to toe instead of only from knee to ankle.\"      # HIV Pre-exposure Prophylaxis  - Risk Factors for HIV infection: he and his partner recently opened their relationship, both have outside partners, condoms sometime with outside partners  - no history of STDs other than HPV  - PrEP Start Date: October 2021, Truvada  - no concerns for side effects  - Missed doses?: no, has daily reminder on phone  - HBV testing: do today, was vaccinated as a child because father is a doctor  - HCV testing: do today  - HAV " immunization: has not had  Creatinine   Date Value Ref Range Status   2021 0.99 0.66 - 1.25 mg/dL Final   2019 0.84 0.66 - 1.25 mg/dL Final   - 10/25/21 Creatinine 1.02    # Insomnia  - takes clonazepam 1mg as needed for sleep  - takes about 1-4 tablets a month  - was put on clonazepam rather than a hypnotic because insomnia is anxiety-related    # History of anal warts  - saw [facility] recently for hemorrhoid, was recommended to have HPV testing given history of warts  - 3/30/22 screening anal cytology showed ASCUS, positive HPV 82v, weakly oncogenic type  - had HRA in 2022 with Jannie Peoples, normal, repeat HRA in 1 year       # Health Maintenance  - BP:   BP Readings from Last 3 Encounters:   22 108/71   22 104/64   22 123/82   - Cholesterol:   Recent Labs   Lab Test 19  0933   CHOL 173   HDL 72   LDL 94   TRIG 38     - Diabetes Screenin21 random glucose 98  - Exercise: swims average of 5-6 days a week, 20-30 minutes at a time    Today's PHQ-2 Score:   PHQ-2 (  [employer]) 2022 3/30/2022   Q1: Little interest or pleasure in doing things 0 0   Q2: Feeling down, depressed or hopeless 0 0   PHQ-2 Score 0 0   PHQ-2 Total Score (12-17 Years)- Positive if 3 or more points; Administer PHQ-A if positive - -     Review of Systems:   Constitutional - no fevers, chills, night sweats, unintentional weight loss/gain   Eyes - no vision concerns   Ears/Nose/Throat - no hearing concerns, no dysphagia/odynophagia   Cardiovascular - no chest pain, palpitations   Pulmonary - no shortness of breath, wheezing, coughing   GI - no abdominal pain, constipation, diarrhea, nausea, vomiting    - no dysuria, polyuria, hematuria   Musculoskeletal - chronic right ankle/knee pain  Integument - no rash   Neuro - no weakness, numbness, paresthesia   Heme - no easy bruising/bleeding   Endocrine - no polyuria, weight loss/gain, dry skin, excessive sweating, hair loss   Psychiatric - no  "feelings of depressed mood or anhedonia in past 2 weeks   Allergic/Immunologic - no history of anaphylaxis, no history of recurrent infections    Past Medical History:   Diagnosis Date     Chronic osteoarthritis      Degenerative joint disease      Hypertrophy of bone of lower leg     see details in problem list     Other neutropenia (H)      Past Surgical History:   Procedure Laterality Date     KNEE SURGERY       leg surgeries      5-6 debulking surgeries of R lower leg and stunding of the growth plate     Family History   Problem Relation Age of Onset     Thyroid Cancer Mother      Immunodeficiency Mother      No Known Problems Father      No Known Problems Sister      No Known Problems Brother      Cerebrovascular Disease Maternal Grandfather         late 70's     Diabetes Paternal Aunt         ?DMI     Diabetes Cousin         ?DMI     Diabetes Cousin      No Known Problems Half-Brother      Social History     Tobacco Use     Smoking status: Never Smoker     Smokeless tobacco: Never Used   Vaping Use     Vaping Use: Never used   Substance Use Topics     Alcohol use: Yes     Alcohol/week: 6.0 standard drinks     Types: 6 Standard drinks or equivalent per week     Drug use: No     Social History     Social History Narrative     Not on file       Current Outpatient Medications   Medication     clonazePAM (KLONOPIN) 1 MG tablet     emtricitabine-tenofovir (TRUVADA) 200-300 MG per tablet     Multiple Vitamins-Minerals (MULTIVITAMIN ADULT PO)     No current facility-administered medications for this visit.     I have reviewed the patient's past medical, surgical, family, and social history.     OBJECTIVE:   /71 (BP Location: Right arm, Patient Position: Sitting, Cuff Size: Adult Regular)   Pulse 52   Temp 98  F (36.7  C) (Skin)   Resp 12   Ht 1.835 m (6' 0.24\")   Wt 69.1 kg (152 lb 4 oz)   SpO2 99%   BMI 20.51 kg/m      Constitutional: well-appearing, appears stated age  Eyes: conjunctivae without " erythema, sclera anicteric. Pupils equal, round, and reactive to light.   ENT: oropharynx clear, TM grey bilateral  Cardiac: regular rate and rhythm, normal S1/S2, no murmur/rubs/gallops  Respiratory: lungs clear to auscultation bilaterally, normal work of breathing, no wheezes/crackles  GI: abdomen soft, non-tender, non-distended  Extremities: warm and well perfused, radial pulses 2+ and equal, cap refill brisk.  Lymph: no cervical or supraclavicular lymphadenopathy  Skin: no rashes, lesions, or wounds  Psych: affect is full and appropriate, speech is fluent and non-pressured

## 2022-04-21 ENCOUNTER — OFFICE VISIT (OUTPATIENT)
Dept: FAMILY MEDICINE | Facility: CLINIC | Age: 37
End: 2022-04-21
Payer: COMMERCIAL

## 2022-04-21 VITALS
HEIGHT: 72 IN | BODY MASS INDEX: 20.62 KG/M2 | SYSTOLIC BLOOD PRESSURE: 108 MMHG | HEART RATE: 52 BPM | TEMPERATURE: 98 F | OXYGEN SATURATION: 99 % | WEIGHT: 152.25 LBS | RESPIRATION RATE: 12 BRPM | DIASTOLIC BLOOD PRESSURE: 71 MMHG

## 2022-04-21 DIAGNOSIS — Z11.59 NEED FOR HEPATITIS C SCREENING TEST: ICD-10-CM

## 2022-04-21 DIAGNOSIS — Z11.3 ROUTINE SCREENING FOR STI (SEXUALLY TRANSMITTED INFECTION): ICD-10-CM

## 2022-04-21 DIAGNOSIS — Z20.6 CONTACT WITH AND (SUSPECTED) EXPOSURE TO HUMAN IMMUNODEFICIENCY VIRUS (HIV): ICD-10-CM

## 2022-04-21 DIAGNOSIS — G47.00 INSOMNIA, UNSPECIFIED TYPE: ICD-10-CM

## 2022-04-21 DIAGNOSIS — Z00.00 VISIT FOR WELL MAN HEALTH CHECK: Primary | ICD-10-CM

## 2022-04-21 DIAGNOSIS — Z23 NEED FOR VACCINATION: ICD-10-CM

## 2022-04-21 DIAGNOSIS — R85.81 ANAL HIGH RISK HPV DNA TEST POSITIVE: ICD-10-CM

## 2022-04-21 LAB
CREAT SERPL-MCNC: 0.95 MG/DL (ref 0.66–1.25)
GFR SERPL CREATININE-BSD FRML MDRD: >90 ML/MIN/1.73M2
HBV CORE AB SERPL QL IA: NONREACTIVE
HBV SURFACE AB SERPL IA-ACNC: 73.38 M[IU]/ML
HBV SURFACE AG SERPL QL IA: NONREACTIVE
HCV AB SERPL QL IA: NONREACTIVE
HIV 1+2 AB+HIV1 P24 AG SERPL QL IA: NONREACTIVE
T PALLIDUM AB SER QL: NONREACTIVE

## 2022-04-21 PROCEDURE — 86706 HEP B SURFACE ANTIBODY: CPT | Performed by: FAMILY MEDICINE

## 2022-04-21 PROCEDURE — 86803 HEPATITIS C AB TEST: CPT | Performed by: FAMILY MEDICINE

## 2022-04-21 PROCEDURE — 82565 ASSAY OF CREATININE: CPT | Performed by: FAMILY MEDICINE

## 2022-04-21 PROCEDURE — 86780 TREPONEMA PALLIDUM: CPT | Performed by: FAMILY MEDICINE

## 2022-04-21 PROCEDURE — 87389 HIV-1 AG W/HIV-1&-2 AB AG IA: CPT | Performed by: FAMILY MEDICINE

## 2022-04-21 PROCEDURE — 87340 HEPATITIS B SURFACE AG IA: CPT | Performed by: FAMILY MEDICINE

## 2022-04-21 PROCEDURE — 86704 HEP B CORE ANTIBODY TOTAL: CPT | Performed by: FAMILY MEDICINE

## 2022-04-21 RX ORDER — CLONAZEPAM 1 MG/1
1 TABLET ORAL
Qty: 30 TABLET | Refills: 0 | Status: SHIPPED | OUTPATIENT
Start: 2022-04-21

## 2022-04-21 NOTE — NURSING NOTE
Prior to immunization administration, verified patients identity using patient s name and date of birth. Please see Immunization Activity for additional information.     Screening Questionnaire for Adult Immunization    Are you sick today?   No   Do you have allergies to medications, food, a vaccine component or latex?   No   Have you ever had a serious reaction after receiving a vaccination?   No   Do you have a long-term health problem with heart, lung, kidney, or metabolic disease (e.g., diabetes), asthma, a blood disorder, no spleen, complement component deficiency, a cochlear implant, or a spinal fluid leak?  Are you on long-term aspirin therapy?   No   Do you have cancer, leukemia, HIV/AIDS, or any other immune system problem?   No   Do you have a parent, brother, or sister with an immune system problem?   No   In the past 3 months, have you taken medications that affect  your immune system, such as prednisone, other steroids, or anticancer drugs; drugs for the treatment of rheumatoid arthritis, Crohn s disease, or psoriasis; or have you had radiation treatments?   No   Have you had a seizure, or a brain or other nervous system problem?   No   During the past year, have you received a transfusion of blood or blood    products, or been given immune (gamma) globulin or antiviral drug?   No   For women: Are you pregnant or is there a chance you could become       pregnant during the next month?   No   Have you received any vaccinations in the past 4 weeks?   No     Immunization questionnaire answers were all negative.        Per orders of Dr. Swain, injection of HPV 9 given by Mandie Alexander CMA. Patient instructed to remain in clinic for 15 minutes afterwards, and to report any adverse reaction to me immediately.       Screening performed by Mandie Alexander CMA on 4/21/2022 at 10:35 AM.

## 2022-04-21 NOTE — NURSING NOTE
"36 year old  Chief Complaint   Patient presents with     Truvada     3 month check in     Immunization     Start HPV series     Establish Care       Blood pressure 108/71, pulse 52, temperature 98  F (36.7  C), temperature source Skin, resp. rate 12, height 1.835 m (6' 0.24\"), weight 69.1 kg (152 lb 4 oz), SpO2 99 %. Body mass index is 20.51 kg/m .  Patient Active Problem List   Diagnosis     Hypertrophy of bone of lower leg     Lymphedema of right lower extremity     Anxiety     Social anxiety disorder     Tinea corporis       Wt Readings from Last 2 Encounters:   04/21/22 69.1 kg (152 lb 4 oz)   04/07/22 71 kg (156 lb 8 oz)     BP Readings from Last 3 Encounters:   04/21/22 108/71   04/07/22 104/64   03/30/22 123/82         Current Outpatient Medications   Medication     clonazePAM (KLONOPIN) 1 MG tablet     emtricitabine-tenofovir (TRUVADA) 200-300 MG per tablet     Multiple Vitamins-Minerals (MULTIVITAMIN ADULT PO)     No current facility-administered medications for this visit.       Social History     Tobacco Use     Smoking status: Never Smoker     Smokeless tobacco: Never Used   Vaping Use     Vaping Use: Never used   Substance Use Topics     Alcohol use: Yes     Alcohol/week: 6.0 standard drinks     Types: 6 Standard drinks or equivalent per week     Drug use: No       Health Maintenance Due   Topic Date Due     ADVANCE CARE PLANNING  Never done     Pneumococcal Vaccine: Pediatrics (0 to 5 Years) and At-Risk Patients (6 to 64 Years) (1 of 4 - PCV13) Never done     HEPATITIS C SCREENING  Never done     PREVENTIVE CARE VISIT  09/13/2020     COVID-19 Vaccine (3 - Booster for Sandro series) 01/31/2022       No results found for: PAP      April 21, 2022 9:16 AM    "

## 2022-04-21 NOTE — LETTER
[facility]  04/21/22  Patient: Manuel Goddard  YOB: 1985  Medical Record Number: [id number]                                                                                  Non-Opioid Controlled Substance Agreement    This is an agreement between you and your provider regarding safe and appropriate use of controlled substances prescribed by your care team. Controlled substances are?medicines that can cause physical and mental dependence (abuse).     There are strict laws about having and using these medicines. We here at [facility] are  committed to working with you in your efforts to get better. To support you in this work, we'll help you schedule regular office appointments for medicine refills. If we must cancel or change your appointment for any reason, we'll make sure you have enough medicine to last until your next appointment.     As a Provider, I will:     Listen carefully to your concerns while treating you with respect.     Recommend a treatment plan that I believe is in your best interest and may involve therapies other than medicine.      Talk with you often about the possible benefits and the risk of harm of any medicine that we prescribe for you.    Assess the safety of this medicine and check how well it works.      Provide a plan on how to taper (discontinue or go off) using this medicine if the decision is made to stop its use.      ::  As a Patient, I understand controlled substances:       Are prescribed by my care provider to help me function or work and manage my condition(s).?    Are strong medicines and can cause serious side effects.       Need to be taken exactly as prescribed.?Combining controlled substances with certain medicines or chemicals (such as illegal drugs, alcohol, sedatives, sleeping pills, and benzodiazepines) can be dangerous or even fatal.? If I stop taking my medicines suddenly, I may have severe withdrawal symptoms.     The risks, benefits,  and side effects of these medicine(s) were explained to me. I agree that:    1. I will take part in other treatments as advised by my care team. This may be psychiatry or counseling, physical therapy, behavioral therapy, group treatment or a referral to specialist.    2. I will keep all my appointments and understand this is part of the monitoring of controlled substances.?My care team may require an office visit for EVERY controlled substance refill. If I miss appointments or don t follow instructions, my care team may stop my medicine    3. I will take my medicines as prescribed. I will not change the dose or schedule unless my care team tells me to. There will be no refills if I run out early.      4. I may be asked to come to the clinic and complete a urine drug test or complete a pill count. If I don t give a urine sample or participate in a pill count, the care team may stop my medicine.    5. I will only receive controlled substance prescriptions from this clinic. If I am treated by another provider, I will tell them that I am taking controlled substances and that I have a treatment agreement with this provider. I will inform my [facility] care team within one business day if I am given a prescription for any controlled substance by another healthcare provider. My [facility] care team can contact other providers and pharmacists about my use of any medicines.    6. It is up to me to make sure that I don't run out of my medicines on weekends or holidays.?If my care team is willing to refill my prescription without a visit, I must request refills only during office hours. Refills may take up to 3 business days to process. I will use one pharmacy to fill all my controlled substance prescriptions. I will notify the clinic about any changes to my insurance or medicine availability.    7. I am responsible for my prescriptions. If the medicine/prescription is lost, stolen or destroyed, it will not be  replaced.?I also agree not to share controlled substance medicines with anyone.     8. I am aware I should not use any illegal or recreational drugs. I agree not to drink alcohol unless my care team says I can.     9. If I enroll in the [place] Medical Cannabis program, I will tell my care team before my next refill.    10. I will tell my care team right away if I become pregnant, have a new medical problem treated outside of my regular clinic, or have a change in my medicines.     11. I understand that this medicine can affect my thinking, judgment and reaction time.? Alcohol and drugs affect the brain and body, which can affect the safety of my driving. Being under the influence of alcohol or drugs can affect my decision-making, behaviors, personal safety and the safety of others. Driving while impaired (DWI) can occur if a person is driving, operating or in physical control of a car, motorcycle, boat, snowmobile, ATV, motorbike, off-road vehicle or any other motor vehicle ([place] Statute 169A.20). I understand the risk if I choose to drive or operate any vehicle or machinery.    I understand that if I do not follow any of the conditions above, my prescriptions or treatment may be stopped or changed.   I agree that my provider, clinic care team and pharmacy may work with any city, state or federal law enforcement agency that investigates the misuse, sale or other diversion of my controlled medicine. I will allow my provider to discuss my care with, or share a copy of, this agreement with any other treating provider, pharmacy or emergency room where I receive care.     I have read this agreement and have asked questions about anything I did not understand.    ________________________________________________________  Patient Signature - Manuel Goddard     ___________________                   Date     ________________________________________________________  Provider Signature - Aries Swain MD        ___________________                   Date     ________________________________________________________  Witness Signature (required if provider not present while patient signing)          ___________________                   Date

## 2022-04-22 DIAGNOSIS — Z20.6 CONTACT WITH AND (SUSPECTED) EXPOSURE TO HUMAN IMMUNODEFICIENCY VIRUS (HIV): Primary | ICD-10-CM

## 2022-04-22 RX ORDER — EMTRICITABINE AND TENOFOVIR DISOPROXIL FUMARATE 200; 300 MG/1; MG/1
1 TABLET, FILM COATED ORAL DAILY
Qty: 90 TABLET | Refills: 0 | Status: SHIPPED | OUTPATIENT
Start: 2022-04-22 | End: 2022-07-25

## 2022-04-24 PROBLEM — R85.81 ANAL HIGH RISK HPV DNA TEST POSITIVE: Status: ACTIVE | Noted: 2022-04-24

## 2022-04-24 PROBLEM — G47.00 INSOMNIA, UNSPECIFIED TYPE: Status: ACTIVE | Noted: 2022-04-24

## 2022-04-24 PROBLEM — G47.00 INSOMNIA, UNSPECIFIED TYPE: Chronic | Status: ACTIVE | Noted: 2022-04-24

## 2022-04-24 PROBLEM — R85.81 ANAL HIGH RISK HPV DNA TEST POSITIVE: Chronic | Status: ACTIVE | Noted: 2022-04-24

## 2022-04-24 PROBLEM — B35.4 TINEA CORPORIS: Status: RESOLVED | Noted: 2021-05-04 | Resolved: 2022-04-24

## 2022-04-24 PROBLEM — F41.9 ANXIETY: Status: RESOLVED | Noted: 2018-04-11 | Resolved: 2022-04-24

## 2022-04-24 PROBLEM — Z20.6 CONTACT WITH AND (SUSPECTED) EXPOSURE TO HUMAN IMMUNODEFICIENCY VIRUS (HIV): Status: ACTIVE | Noted: 2022-04-24

## 2022-04-24 PROBLEM — I89.0 LYMPHEDEMA OF RIGHT LOWER EXTREMITY: Status: RESOLVED | Noted: 2017-09-19 | Resolved: 2022-04-24

## 2022-04-24 PROBLEM — F40.10 SOCIAL ANXIETY DISORDER: Status: RESOLVED | Noted: 2018-04-11 | Resolved: 2022-04-24

## 2022-04-24 PROBLEM — Z20.6 CONTACT WITH AND (SUSPECTED) EXPOSURE TO HUMAN IMMUNODEFICIENCY VIRUS (HIV): Chronic | Status: ACTIVE | Noted: 2022-04-24

## 2022-05-24 ENCOUNTER — NURSE TRIAGE (OUTPATIENT)
Dept: FAMILY MEDICINE | Facility: CLINIC | Age: 37
End: 2022-05-24
Payer: COMMERCIAL

## 2022-05-24 NOTE — TELEPHONE ENCOUNTER
Message left for Alcides to call the clinic to discuss his positive COVID-19 home antigen test.  Will await return call.  DEREK Cheek, RN, [facility]  05/24/22  1:48 PM

## 2022-05-24 NOTE — TELEPHONE ENCOUNTER
Reason for Disposition    [1] COVID-19 diagnosed by positive lab test (e.g., PCR, rapid self-test kit) AND [2] mild symptoms (e.g., cough, fever, others) AND [3] no complications or SOB    Answer Assessment - Initial Assessment Questions  1. COVID-19 DIAGNOSIS: Positive Home COVID-19 Antigen Test 5/24/22  2. COVID-19 EXPOSURE: Unknown  3. ONSET: 5/23/22  4. WORST SYMPTOM: Sore throat  5. COUGH: No  6. FEVER: No  7. RESPIRATORY STATUS: Normal  8. BETTER-SAME-WORSE: A little worse, fatigue is worse  9. HIGH RISK DISEASE: Depression/anxiety  10. VACCINE: J&J - 4/3/21  11. BOOSTER: Pfizer #1 - 12/6/21  12. PREGNANCY: N/A  13. OTHER SYMPTOMS: Fatigue  14. O2 SATURATION MONITOR:  No    Protocols used: CORONAVIRUS (COVID-19) DIAGNOSED OR [id number] 1.18.2022    Reviewed home remedy and isolation protocols.  Discussed when to seek urgent care and [facility] visit over the weekend should his symptoms worsen during his 5-day window of treatment initiation.  AMOR CheekN, RN, [facility]  05/24/22  2:19 PM

## 2022-06-04 NOTE — TELEPHONE ENCOUNTER
[facility] - Primary Care Specialists    Comprehensive and complex medical care - Chronic disease management - Shared decision making - Care coordination - Compassionate care    Patient advocacy - Rational deprescribing - Minimally disruptive medicine - Ethical focus - Customized care         Date of Service: 6/2/2022  Primary Provider: Damian Stevenson    Patient Care Team:  Damian Stevenson MD as PCP - Dana Galaviz, Melissa as Pharmacist (Pharmacist)  Damian Stevenson MD as Assigned PCP  Adina Villalba MD as Assigned Surgical Provider  Zoila Franco as MD (Dermatology)  Kathleen Pathak MD as MD (Ophthalmology)  Monico Gamez DO as Assigned Neuroscience Provider  Emerson Nunez MD as Assigned Heart and Vascular Provider          Patient's Pharmacy:    [id number] [[address] [address]  Phone: [phone] Fax: [phone]     Patient's Contacts:  Name Home Phone Work Phone Mobile Phone Relationship Lgl TITA Scott   [phone] Spouse    RAVI HERNÁNDEZ   [phone] Other      Patient's Insurance:    Payor: ARE / Plan: ARE MEDICARE / Product Type: HMO /            Active Problem List:  Problem List as of 6/2/2022 Reviewed: 3/1/2022  3:08 PM by Shasta Morales PA-C       High    Tobacco abuse disorder    DNR (do not resuscitate)    PAF (paroxysmal atrial fibrillation) (H)    Chronic systolic heart failure (H)    CAD (coronary artery disease)       Medium    Severe aortic stenosis    Spinal stenosis at L4-L5 level, severe    Anemia of chronic disease    Immunosuppression (H)    Pressure injury of left buttock, stage 2 (H)       Unprioritized    Infection due to 2019 novel coronavirus       Low    Carotid artery stenosis    Incisional hernia    OP (osteoporosis)    Gastrointestinal hemorrhage, unspecified gastrointestinal hemorrhage type    Guaiac + stool     UPDATE: Patient had to reschedule this appointment for 3/30 @ 5PM.    Ground glass opacity present on imaging of lung - 12/20 - Follow up due 12/22    Poor dentition    Closed fracture of multiple ribs of left side, initial encounter    CNH (chondrodermatitis nodularis helicis), bilateral       Other    HTN (hypertension)    HLD (hyperlipidemia)    GERD (gastroesophageal reflux disease)    Macrocytic anemia    Psoriasiform dermatitis    Anxiety       Other    Hypothyroidism due to amiodarone       Other    Chronic rhinitis           Current Outpatient Medications   Medication Instructions     acetaminophen (TYLENOL) 1,000 mg, 3 TIMES DAILY     albuterol (PROAIR HFA;PROVENTIL HFA;VENTOLIN HFA) 90 mcg/actuation inhaler 2 puffs, 4 TIMES DAILY     apixaban ANTICOAGULANT (ELIQUIS) 2.5 mg, Oral, 2 TIMES DAILY     atorvastatin (LIPITOR) 40 mg, Oral, DAILY     baclofen (LIORESAL) 5-10 mg, Oral, 2 TIMES DAILY PRN     calcipotriene (DOVONOX) 0.005 % cream 1 Application, SEE ADMIN INSTRUCTIONS     cholecalciferol 1,000 Units, DAILY     citalopram (CELEXA) 10 mg, Oral, AT BEDTIME     clobetasoL (TEMOVATE) 0.05 % ointment 1 Application, TWICE WEEKLY     DEBROX 6.5 % otic solution INSTILL 5 DROPS IN BOTH EARS TWICE A DAY FOR 3 DAYS     diclofenac (VOLTAREN) 1 % topical gel APPLY 2 G TOPICALLY 3 TIMES DAILY AS NEEDED FOR PAIN     famotidine (PEPCID) 40 mg, Oral, DAILY     ferrous sulfate (FEROSUL) 325 mg, Oral, DAILY     furosemide (LASIX) 40 mg, Oral, DAILY     gabapentin (NEURONTIN) 100 mg, Oral, AT BEDTIME     hydrALAZINE (APRESOLINE) 50 mg, Oral, 3 TIMES DAILY     levothyroxine (SYNTHROID/LEVOTHROID) 75 mcg, Oral, DAILY     losartan (COZAAR) 50 mg, Oral, DAILY     methyl salicylate-menthol Oint 1 Application, 4 TIMES DAILY PRN     metoprolol tartrate (LOPRESSOR) 50 mg, Oral, 3 TIMES DAILY     MULTIVITAMIN ORAL 1 tablet, DAILY     PACERONE 100 MG TABS tablet TAKE ONE TABLET BY MOUTH ONE TIME DAILY     triamcinolone (KENALOG) 0.1 % cream 1 Application, 2 TIMES DAILY     vitamin C (ASCORBIC ACID)  1,000 mg, DAILY     Social History     Social History Narrative    Patient of Dr. Stevenson since 2015. [place] with her     Moved to [place] assisted [facility] in 2021.  [employer]       Subjective:     Cecy Sneed is a 83 year old female who comes in today for:    Chief Complaint   Patient presents with     RECHECK     AFIB, aortic stenosis      Accompanied by  today.      Generally about the same.    No new symptoms related to her aortic stenosis.  Breathing is stable.    Sore gone on buttock.    Reviewed recent WALK IN CARE visits.  Is following up with Dr. Montoya through [facility].    Reviewed her hypertension today.  Blood pressure has been in the goal range.  Denies any excessive dizziness from the medication with this.     Reviewed the patient's atrial fibrillation.  No issues with this.  Anticoagulation previously reviewed.     Osteoporosis (OP) stable as per Dr. Boswell.    We reviewed her other issues noted in the assessment but not specifically addressed in the HPI above.     Objective:     Wt Readings from Last 3 Encounters:   05/23/22 57.7 kg (127 lb 1.6 oz)   05/02/22 52.9 kg (116 lb 11.2 oz)   03/01/22 52.6 kg (115 lb 15.4 oz)     BP Readings from Last 3 Encounters:   06/02/22 132/64   05/23/22 128/60   05/02/22 133/54     /64   Pulse 56   Resp 18   SpO2 98%    The patient is comfortable, no acute distress.  Mood good.  Insight good.  Eyes are nonicteric.  Neck is supple without mass.  No cervical adenopathy.  No thyromegaly. Heart regular rate and rhythm.  Murmur stable.  Lungs clear to auscultation bilaterally.  Respiratory effort is good.  Abdomen soft and nontender.   Extremities trace edema.    Diagnostics:     Lab on 02/15/2022   Component Date Value Ref Range Status     Amiodarone Level 02/15/2022 186 (A) 1000 - 2500 ng/mL Final     Desethylamiodarone Level 02/15/2022 175  ng/mL Final    Note:  To convert from ng/ml to ug/ml, divide the  result by         1000. Reference range (amiodarone): 1.00-2.50 ug/mL.    This test was developed and its performance characteristics  determined by [employer]. It has not been cleared or approved  by the Food and Drug Administration.     ALT 02/15/2022 18  0 - 45 U/L Final     TSH 02/15/2022 2.78  0.30 - 5.00 uIU/mL Final     Haptoglobin 02/15/2022 58  33 - 171 mg/dL Final     Cold Agglutinins 02/15/2022 <1:32  <1:32 Final    INTERPRETIVE INFORMATION: Cold Agglutinins    Titers of 1:32 or higher are considered elevated by this   technique. Elevated titers are rarely seen except in   primary atypical pneumonia and in certain hemolytic   anemias. If the agglutination is not reversible after   incubation at 37 degrees Celsius, then the reaction is not   due to cold agglutinins.    Primary atypical pneumonia can be caused by Mycoplasma   pneumoniae, influenza A, influenza B, parainfluenza, and   adenoviruses. However, a fourfold rise in the cold   agglutinins usually begins to appear late in the first week   or during the second week of the disease and begins to   decrease between the fourth and sixth week. Low titers of   cold agglutinins have been demonstrated in malaria,   peripheral vascular disease, and common respiratory disease.  Performed By: [facility]  [address] [place] [id number]  : Gabriela Todd MD     RAD interpretation 02/15/2022 Borderline Positive (A) Negative Final      Negative:              <1:40  Borderline Positive:   1:40 - 1:80  Positive:              >1:80     RAD pattern 1 02/15/2022 Speckled   Final     RAD titer 1 02/15/2022 1:80   Final     Immunoglobulin G 02/15/2022 1,206  700-1,700 mg/dL Final     Immunoglobulin A 02/15/2022 381  65 - 400 mg/dL Final     Immunoglobulin M 02/15/2022 65  60 - 280 mg/dL Final     CAITLYN, ANTI-IGG, C3 02/15/2022 NEG  Negative Final     SPECIMEN EXPIRATION DATE 02/15/2022 [id number]   Final        No results found for  any visits on 06/02/22.    Assessment:     1. Age-related osteoporosis without current pathological fracture    2. Primary hypertension    3. Severe aortic stenosis    4. Psoriasiform dermatitis    5. PAF (paroxysmal atrial fibrillation) (H)    6. Macrocytic anemia         Plan:     1. Blood work to be done at next visit.  2. Doing about the same overall.  3. Patient generally not wanting a lot more aggressive management.  4. Continue current medications otherwise.  5. Follow up sooner if issues.    No orders of the defined types were placed in this encounter.      Damian Stevenson MD  General Internal Medicine  [facility]    Return in about 18 weeks (around 10/6/2022), or if symptoms worsen or fail to improve, for visit and blood work.     Future Appointments   Date Time Provider Department Center   6/28/2022 10:30 AM SPMW MA/LPN [facility]   6/28/2022  2:00 PM [email or web address]   10/6/2022  2:00 PM [facility]   5/26/2023  1:30 PM [facility]

## 2022-06-10 ENCOUNTER — ALLIED HEALTH/NURSE VISIT (OUTPATIENT)
Dept: FAMILY MEDICINE | Facility: CLINIC | Age: 37
End: 2022-06-10
Payer: COMMERCIAL

## 2022-06-10 DIAGNOSIS — Z23 NEED FOR VACCINATION: Primary | ICD-10-CM

## 2022-06-10 NOTE — PROGRESS NOTES
Prior to immunization administration, verified patients identity using patient s name and date of birth. Please see Immunization Activity for additional information.     Screening Questionnaire for Adult Immunization    Are you sick today?   No   Do you have allergies to medications, food, a vaccine component or latex?   No   Have you ever had a serious reaction after receiving a vaccination?   No   Do you have a long-term health problem with heart, lung, kidney, or metabolic disease (e.g., diabetes), asthma, a blood disorder, no spleen, complement component deficiency, a cochlear implant, or a spinal fluid leak?  Are you on long-term aspirin therapy?   No   Do you have cancer, leukemia, HIV/AIDS, or any other immune system problem?   No   Do you have a parent, brother, or sister with an immune system problem?   No   In the past 3 months, have you taken medications that affect  your immune system, such as prednisone, other steroids, or anticancer drugs; drugs for the treatment of rheumatoid arthritis, Crohn s disease, or psoriasis; or have you had radiation treatments?   No   Have you had a seizure, or a brain or other nervous system problem?   No   During the past year, have you received a transfusion of blood or blood    products, or been given immune (gamma) globulin or antiviral drug?   No   For women: Are you pregnant or is there a chance you could become       pregnant during the next month?   No   Have you received any vaccinations in the past 4 weeks?   No     Immunization questionnaire answers were all negative.        Per orders of Dr. Swain, injection of 2nd HPV given by Mora Langley CMA. Patient instructed to remain in clinic for 15 minutes afterwards, and to report any adverse reaction to me immediately.       Screening performed by Mora Langley CMA on 6/10/2022 at 11:19 AM.      Manuel KELVIN Goddard comes into clinic today at the request of Dr. Swain Ordering Provider for HPV vaccine.      This service  provided today was under the supervising provider of the day Dr. Swain, who was available if needed.    Mora Langley, CMA

## 2022-06-16 DIAGNOSIS — I89.0 LYMPHEDEMA OF RIGHT LOWER EXTREMITY: Primary | ICD-10-CM

## 2022-06-16 DIAGNOSIS — M89.369: ICD-10-CM

## 2022-07-13 ENCOUNTER — MYC REFILL (OUTPATIENT)
Dept: FAMILY MEDICINE | Facility: CLINIC | Age: 37
End: 2022-07-13

## 2022-07-13 DIAGNOSIS — Z11.3 ROUTINE SCREENING FOR STI (SEXUALLY TRANSMITTED INFECTION): Primary | ICD-10-CM

## 2022-07-13 DIAGNOSIS — Z20.6 CONTACT WITH AND (SUSPECTED) EXPOSURE TO HUMAN IMMUNODEFICIENCY VIRUS (HIV): ICD-10-CM

## 2022-07-14 RX ORDER — EMTRICITABINE AND TENOFOVIR DISOPROXIL FUMARATE 200; 300 MG/1; MG/1
1 TABLET, FILM COATED ORAL DAILY
Qty: 90 TABLET | Refills: 0 | OUTPATIENT
Start: 2022-07-14

## 2022-07-22 ENCOUNTER — LAB (OUTPATIENT)
Dept: LAB | Facility: CLINIC | Age: 37
End: 2022-07-22
Payer: COMMERCIAL

## 2022-07-22 ENCOUNTER — ANCILLARY PROCEDURE (OUTPATIENT)
Dept: MRI IMAGING | Facility: CLINIC | Age: 37
End: 2022-07-22
Attending: ORTHOPAEDIC SURGERY
Payer: COMMERCIAL

## 2022-07-22 DIAGNOSIS — Z11.3 ROUTINE SCREENING FOR STI (SEXUALLY TRANSMITTED INFECTION): ICD-10-CM

## 2022-07-22 DIAGNOSIS — Z20.6 CONTACT WITH AND (SUSPECTED) EXPOSURE TO HUMAN IMMUNODEFICIENCY VIRUS (HIV): ICD-10-CM

## 2022-07-22 DIAGNOSIS — I89.0 LYMPHEDEMA OF RIGHT LOWER EXTREMITY: ICD-10-CM

## 2022-07-22 DIAGNOSIS — M89.369: ICD-10-CM

## 2022-07-22 LAB — T PALLIDUM AB SER QL: NONREACTIVE

## 2022-07-22 PROCEDURE — 86780 TREPONEMA PALLIDUM: CPT | Performed by: FAMILY MEDICINE

## 2022-07-22 PROCEDURE — 87491 CHLMYD TRACH DNA AMP PROBE: CPT | Mod: XS | Performed by: FAMILY MEDICINE

## 2022-07-22 PROCEDURE — 73718 MRI LOWER EXTREMITY W/O DYE: CPT | Mod: RT | Performed by: RADIOLOGY

## 2022-07-22 PROCEDURE — 87389 HIV-1 AG W/HIV-1&-2 AB AG IA: CPT | Performed by: FAMILY MEDICINE

## 2022-07-23 LAB
C TRACH DNA SPEC QL PROBE+SIG AMP: NEGATIVE
N GONORRHOEA DNA SPEC QL NAA+PROBE: NEGATIVE

## 2022-07-24 LAB — HIV 1+2 AB+HIV1 P24 AG SERPL QL IA: NONREACTIVE

## 2022-07-25 ENCOUNTER — MYC REFILL (OUTPATIENT)
Dept: FAMILY MEDICINE | Facility: CLINIC | Age: 37
End: 2022-07-25

## 2022-07-25 DIAGNOSIS — Z20.6 CONTACT WITH AND (SUSPECTED) EXPOSURE TO HUMAN IMMUNODEFICIENCY VIRUS (HIV): ICD-10-CM

## 2022-07-25 RX ORDER — EMTRICITABINE AND TENOFOVIR DISOPROXIL FUMARATE 200; 300 MG/1; MG/1
1 TABLET, FILM COATED ORAL DAILY
Qty: 90 TABLET | Refills: 0 | Status: SHIPPED | OUTPATIENT
Start: 2022-07-25 | End: 2022-11-14

## 2022-07-28 ENCOUNTER — OFFICE VISIT (OUTPATIENT)
Dept: ORTHOPEDICS | Facility: CLINIC | Age: 37
End: 2022-07-28
Payer: COMMERCIAL

## 2022-07-28 DIAGNOSIS — Q89.8 HEMIHYPERTROPHY OF LOWER EXTREMITY: Primary | ICD-10-CM

## 2022-07-28 PROCEDURE — 99213 OFFICE O/P EST LOW 20 MIN: CPT | Performed by: ORTHOPAEDIC SURGERY

## 2022-07-28 NOTE — PROGRESS NOTES
[facility], Orthopaedic Oncology Surgery Consultation  by Santana Lopez M.D.     Manuel Goddard MRN# [id number]    YOB: 1985      Requesting physician: Jailene Peña       Background history:  DX:  1. Pravin-hypertrophy of the right lower extremity  2. Chronic right lower extremity lymphadenopathy     TREATMENTS:  1. Multiple right lower extremity fatty tissue debulking procedures  2. Right ankle osteophytic spur removal                   Assessment and Plan:   Assessment:  36-year-old male with past medical history significant for right lower extremity hemihypertrophy with MRI findings consistent with fatty hypertrophy and infiltration.  Stable appearance without any appreciable change on imaging or clinically, i.e. symptomatically.  Some bony degenerative changes as well.      Plan:  Follow-up on an as needed basis.    We had a general discussion regarding potential malignant degeneration.  Knowledge about this condition is sparse however I would predict that although there is probably some elevated risk for him, it is small.     I suggested that periodic MRI examinations every 5 to 10 years might be worthwhile or sooner if he should develop symptoms or functional problems.             History of Present Illness:   33 year old male with past medical history of hemihypertrophy for his right lower extremity and lymphadenopathy of unknown origin who presents to clinic today for evaluation of proximal medial calf soft tissue swelling and prior pain.     He is doing well at the present time and is fully functional.  Is ambulating independently.  He comes in today for MRI follow-up and serial imaging comparison to previous studies.          Physical Exam:      EXAMINATION pertinent findings:   VITAL SIGNS: There were no vitals taken for this visit.  There is no height or weight on file to calculate BMI.    NEURO: grossly normal   VASCULAR: satisfactory  perfusion of all extremities   MUSCULOSKELETAL:   Focused examination of his right lower extremity.  -He has generalized soft tissue hypertrophy in addition to edema from the knee down through the great toe.    A valgus knee with crepitance on range of motion from 0 to 100 degrees of flexion.  He has pseudolaxity to varus stress testing about 0 30 degrees of flexion.    His foot is perfused well without any evidence of ischemia.  He has a bulbous deformity to the right great toe.  With ambulation he has of slight plantar flexion contracture of his tibiotalar joint ambulates on the ball of his foot.           Data:   All laboratory data reviewed  All imaging studies reviewed by me      MRI examination of entire extremity from hip to ankle is compared to previous MRI examinations and demonstrates hemihypertrophy of the right lower extremity.  No appreciable demonstrable change.  No concern for any specific soft tissue masses.    MD Adelaida Walker Family Professor  Oncology and Adult Reconstructive Surgery  Dept Orthopaedic Surgery, [facility]  [phone] office, [phone] pager  [email or web address]    Total combined visit time and work time before and after clinic visit = 20 min

## 2022-07-28 NOTE — LETTER
7/28/2022         RE: Manuel Goddard  [address]  [facility] [address]        Dear Colleague,    Thank you for referring your patient, Manuel Goddard, to the [facility] [place]. Please see a copy of my visit note below.         [facility], Orthopaedic Oncology Surgery Consultation  by Santana Lopez M.D.     Manuel Goddard MRN# [id number]    YOB: 1985      Requesting physician: Jailene Peña       Background history:  DX:  1. Pravin-hypertrophy of the right lower extremity  2. Chronic right lower extremity lymphadenopathy     TREATMENTS:  1. Multiple right lower extremity fatty tissue debulking procedures  2. Right ankle osteophytic spur removal                   Assessment and Plan:   Assessment:  36-year-old male with past medical history significant for right lower extremity hemihypertrophy with MRI findings consistent with fatty hypertrophy and infiltration.  Stable appearance without any appreciable change on imaging or clinically, i.e. symptomatically.  Some bony degenerative changes as well.      Plan:  Follow-up on an as needed basis.    We had a general discussion regarding potential malignant degeneration.  Knowledge about this condition is sparse however I would predict that although there is probably some elevated risk for him, it is small.     I suggested that periodic MRI examinations every 5 to 10 years might be worthwhile or sooner if he should develop symptoms or functional problems.             History of Present Illness:   33 year old male with past medical history of hemihypertrophy for his right lower extremity and lymphadenopathy of unknown origin who presents to clinic today for evaluation of proximal medial calf soft tissue swelling and prior pain.     He is doing well at the present time and is fully functional.  Is ambulating independently.  He comes in today for MRI follow-up and  serial imaging comparison to previous studies.          Physical Exam:      EXAMINATION pertinent findings:   VITAL SIGNS: There were no vitals taken for this visit.  There is no height or weight on file to calculate BMI.    NEURO: grossly normal   VASCULAR: satisfactory perfusion of all extremities   MUSCULOSKELETAL:   Focused examination of his right lower extremity.  -He has generalized soft tissue hypertrophy in addition to edema from the knee down through the great toe.    A valgus knee with crepitance on range of motion from 0 to 100 degrees of flexion.  He has pseudolaxity to varus stress testing about 0 30 degrees of flexion.    His foot is perfused well without any evidence of ischemia.  He has a bulbous deformity to the right great toe.  With ambulation he has of slight plantar flexion contracture of his tibiotalar joint ambulates on the ball of his foot.           Data:   All laboratory data reviewed  All imaging studies reviewed by me      MRI examination of entire extremity from hip to ankle is compared to previous MRI examinations and demonstrates hemihypertrophy of the right lower extremity.  No appreciable demonstrable change.  No concern for any specific soft tissue masses.    Santana Lopez MD  [facility] Family Professor  Oncology and Adult Reconstructive Surgery  Dept Orthopaedic Surgery, [facility]  [phone] office, [phone] pager  [email or web address]    Total combined visit time and work time before and after clinic visit = 20 min

## 2022-09-11 ENCOUNTER — HEALTH MAINTENANCE LETTER (OUTPATIENT)
Age: 37
End: 2022-09-11

## 2022-11-08 ENCOUNTER — MYC MEDICAL ADVICE (OUTPATIENT)
Dept: FAMILY MEDICINE | Facility: CLINIC | Age: 37
End: 2022-11-08

## 2022-11-08 DIAGNOSIS — Z11.3 ROUTINE SCREENING FOR STI (SEXUALLY TRANSMITTED INFECTION): ICD-10-CM

## 2022-11-08 DIAGNOSIS — Z20.6 CONTACT WITH AND (SUSPECTED) EXPOSURE TO HUMAN IMMUNODEFICIENCY VIRUS (HIV): Primary | ICD-10-CM

## 2022-11-11 ENCOUNTER — LAB (OUTPATIENT)
Dept: LAB | Facility: CLINIC | Age: 37
End: 2022-11-11
Payer: COMMERCIAL

## 2022-11-11 DIAGNOSIS — Z20.6 CONTACT WITH AND (SUSPECTED) EXPOSURE TO HUMAN IMMUNODEFICIENCY VIRUS (HIV): ICD-10-CM

## 2022-11-11 DIAGNOSIS — Z11.3 ROUTINE SCREENING FOR STI (SEXUALLY TRANSMITTED INFECTION): ICD-10-CM

## 2022-11-11 LAB
CREAT SERPL-MCNC: 1.05 MG/DL (ref 0.67–1.17)
GFR SERPL CREATININE-BSD FRML MDRD: >90 ML/MIN/1.73M2
HIV 1+2 AB+HIV1 P24 AG SERPL QL IA: NONREACTIVE
T PALLIDUM AB SER QL: NONREACTIVE

## 2022-11-11 PROCEDURE — 87389 HIV-1 AG W/HIV-1&-2 AB AG IA: CPT | Performed by: FAMILY MEDICINE

## 2022-11-11 PROCEDURE — 87591 N.GONORRHOEAE DNA AMP PROB: CPT | Performed by: FAMILY MEDICINE

## 2022-11-11 PROCEDURE — 87491 CHLMYD TRACH DNA AMP PROBE: CPT | Performed by: FAMILY MEDICINE

## 2022-11-11 PROCEDURE — 82565 ASSAY OF CREATININE: CPT | Performed by: FAMILY MEDICINE

## 2022-11-11 PROCEDURE — 86780 TREPONEMA PALLIDUM: CPT | Performed by: FAMILY MEDICINE

## 2022-11-12 LAB
C TRACH DNA SPEC QL PROBE+SIG AMP: NEGATIVE
N GONORRHOEA DNA SPEC QL NAA+PROBE: NEGATIVE

## 2022-11-14 DIAGNOSIS — Z20.6 CONTACT WITH AND (SUSPECTED) EXPOSURE TO HUMAN IMMUNODEFICIENCY VIRUS (HIV): ICD-10-CM

## 2022-11-14 RX ORDER — EMTRICITABINE AND TENOFOVIR DISOPROXIL FUMARATE 200; 300 MG/1; MG/1
1 TABLET, FILM COATED ORAL DAILY
Qty: 90 TABLET | Refills: 0 | Status: SHIPPED | OUTPATIENT
Start: 2022-11-14 | End: 2023-02-24

## 2022-11-15 ENCOUNTER — TELEPHONE (OUTPATIENT)
Dept: FAMILY MEDICINE | Facility: CLINIC | Age: 37
End: 2022-11-15

## 2022-11-15 NOTE — TELEPHONE ENCOUNTER
Prior Authorization Not Needed per Insurance    Medication: emtricitabine-tenofovir (TRUVADA) 200-300 MG per tablet  Insurance Company: [employer] ([facility]) - Phone [phone] Fax [phone]  Expected CoPay:      Pharmacy Filling the Rx: [facility] -- [place], [place] - [address] [place] [address]. [address]  Pharmacy Notified: No  Patient Notified: No    No PA needed, medication covered under plan.

## 2022-11-15 NOTE — TELEPHONE ENCOUNTER
Prior Authorization Retail Medication Request    Medication/Dose: emtricitabine-tenofovir (TRUVADA) 200-300 MG per tablet    Manuel REED, RN  11/15/22 3:00 PM

## 2022-11-15 NOTE — TELEPHONE ENCOUNTER
Central Prior Authorization Team   Phone: [phone]    PA Initiation    Medication: emtricitabine-tenofovir (TRUVADA) 200-300 MG per tablet  Insurance Company: [employer] ([employer]) - Phone [phone] Fax [phone]  Pharmacy Filling the Rx: [facility] -- [place], [place] - [address]  Filling Pharmacy Phone: [phone]  Filling Pharmacy Fax:    Start Date: 11/15/2022

## 2023-01-31 ENCOUNTER — ALLIED HEALTH/NURSE VISIT (OUTPATIENT)
Dept: FAMILY MEDICINE | Facility: CLINIC | Age: 38
End: 2023-01-31
Payer: COMMERCIAL

## 2023-01-31 DIAGNOSIS — Z23 NEED FOR VACCINATION: Primary | ICD-10-CM

## 2023-01-31 NOTE — PROGRESS NOTES
Prior to immunization administration, verified patients identity using patient s name and date of birth. Please see Immunization Activity for additional information.     Screening Questionnaire for Adult Immunization    Are you sick today?   No   Do you have allergies to medications, food, a vaccine component or latex?   No   Have you ever had a serious reaction after receiving a vaccination?   No   Do you have a long-term health problem with heart, lung, kidney, or metabolic disease (e.g., diabetes), asthma, a blood disorder, no spleen, complement component deficiency, a cochlear implant, or a spinal fluid leak?  Are you on long-term aspirin therapy?   No   Do you have cancer, leukemia, HIV/AIDS, or any other immune system problem?   No   Do you have a parent, brother, or sister with an immune system problem?   No   In the past 3 months, have you taken medications that affect  your immune system, such as prednisone, other steroids, or anticancer drugs; drugs for the treatment of rheumatoid arthritis, Crohn s disease, or psoriasis; or have you had radiation treatments?   No   Have you had a seizure, or a brain or other nervous system problem?   No   During the past year, have you received a transfusion of blood or blood    products, or been given immune (gamma) globulin or antiviral drug?   No   For women: Are you pregnant or is there a chance you could become       pregnant during the next month?   No   Have you received any vaccinations in the past 4 weeks?   No     Immunization questionnaire answers were all negative.        Per orders of Dr. Swain, injection of HPV given by Mora Langley CMA. Patient instructed to remain in clinic for 15 minutes afterwards, and to report any adverse reaction to me immediately.       Screening performed by Mora Langley CMA on 1/31/2023 at 10:17 AM.    Manuel KELVIN Goddard comes into clinic today at the request of Dr. Swain Ordering Provider for HPV.      This service provided  today was under the supervising provider of the day Dr. Huizar, who was available if needed.    Mora Langley, CMA

## 2023-02-20 ENCOUNTER — MYC REFILL (OUTPATIENT)
Dept: FAMILY MEDICINE | Facility: CLINIC | Age: 38
End: 2023-02-20

## 2023-02-20 DIAGNOSIS — Z11.3 ROUTINE SCREENING FOR STI (SEXUALLY TRANSMITTED INFECTION): Primary | ICD-10-CM

## 2023-02-20 DIAGNOSIS — Z20.6 CONTACT WITH AND (SUSPECTED) EXPOSURE TO HUMAN IMMUNODEFICIENCY VIRUS (HIV): ICD-10-CM

## 2023-02-20 RX ORDER — EMTRICITABINE AND TENOFOVIR DISOPROXIL FUMARATE 200; 300 MG/1; MG/1
1 TABLET, FILM COATED ORAL DAILY
Qty: 90 TABLET | Refills: 0 | Status: CANCELLED | OUTPATIENT
Start: 2023-02-20

## 2023-02-23 ENCOUNTER — LAB (OUTPATIENT)
Dept: LAB | Facility: CLINIC | Age: 38
End: 2023-02-23
Payer: COMMERCIAL

## 2023-02-23 DIAGNOSIS — Z11.3 ROUTINE SCREENING FOR STI (SEXUALLY TRANSMITTED INFECTION): ICD-10-CM

## 2023-02-23 DIAGNOSIS — Z20.6 CONTACT WITH AND (SUSPECTED) EXPOSURE TO HUMAN IMMUNODEFICIENCY VIRUS (HIV): ICD-10-CM

## 2023-02-23 LAB
CREAT SERPL-MCNC: 0.98 MG/DL (ref 0.67–1.17)
GFR SERPL CREATININE-BSD FRML MDRD: >90 ML/MIN/1.73M2

## 2023-02-23 PROCEDURE — 82565 ASSAY OF CREATININE: CPT | Performed by: FAMILY MEDICINE

## 2023-02-23 PROCEDURE — 87389 HIV-1 AG W/HIV-1&-2 AB AG IA: CPT | Performed by: FAMILY MEDICINE

## 2023-02-23 PROCEDURE — 87591 N.GONORRHOEAE DNA AMP PROB: CPT | Performed by: FAMILY MEDICINE

## 2023-02-23 PROCEDURE — 86780 TREPONEMA PALLIDUM: CPT | Performed by: FAMILY MEDICINE

## 2023-02-24 DIAGNOSIS — Z20.6 CONTACT WITH AND (SUSPECTED) EXPOSURE TO HUMAN IMMUNODEFICIENCY VIRUS (HIV): ICD-10-CM

## 2023-02-24 LAB
C TRACH DNA SPEC QL PROBE+SIG AMP: NEGATIVE
HIV 1+2 AB+HIV1 P24 AG SERPL QL IA: NONREACTIVE
N GONORRHOEA DNA SPEC QL NAA+PROBE: NEGATIVE
T PALLIDUM AB SER QL: NONREACTIVE

## 2023-02-24 RX ORDER — EMTRICITABINE AND TENOFOVIR DISOPROXIL FUMARATE 200; 300 MG/1; MG/1
1 TABLET, FILM COATED ORAL DAILY
Qty: 90 TABLET | Refills: 0 | Status: SHIPPED | OUTPATIENT
Start: 2023-02-24 | End: 2023-05-22

## 2023-05-05 ENCOUNTER — MYC MEDICAL ADVICE (OUTPATIENT)
Dept: FAMILY MEDICINE | Facility: CLINIC | Age: 38
End: 2023-05-05

## 2023-05-05 DIAGNOSIS — Z11.3 ROUTINE SCREENING FOR STI (SEXUALLY TRANSMITTED INFECTION): Primary | ICD-10-CM

## 2023-05-05 DIAGNOSIS — Z20.6 CONTACT WITH AND (SUSPECTED) EXPOSURE TO HUMAN IMMUNODEFICIENCY VIRUS (HIV): ICD-10-CM

## 2023-05-12 ENCOUNTER — OFFICE VISIT (OUTPATIENT)
Dept: FAMILY MEDICINE | Facility: CLINIC | Age: 38
End: 2023-05-12
Payer: COMMERCIAL

## 2023-05-12 VITALS
SYSTOLIC BLOOD PRESSURE: 112 MMHG | DIASTOLIC BLOOD PRESSURE: 69 MMHG | TEMPERATURE: 97.8 F | OXYGEN SATURATION: 98 % | WEIGHT: 150 LBS | BODY MASS INDEX: 20.21 KG/M2 | HEART RATE: 51 BPM | RESPIRATION RATE: 15 BRPM

## 2023-05-12 DIAGNOSIS — H92.01 RIGHT EAR PAIN: Primary | ICD-10-CM

## 2023-05-12 NOTE — PROGRESS NOTES
Medical assistant intake:  Manuel Goddard is a 37 year old male who presents to [facility] today for Ear Problem (Hearing popping sounds in right ear with pressure, denies pain, 1-2 weeks )        ASSESSMENT/PLAN:    Alcides has some RIGHT ear popping with a normal exam.   No other symptoms.     Suggested   1. Short course of sudafed or other decongestant in case there is some Eustacian tube dysfunction.   2. Also, try some flonase nasal spray  3. Use Debrox ear drops in case there is some wax or dryness causing symptoms.   4. If symptoms persist, using above for at least 1-2 weeks, then send us a message and we can refer you to E.N.T. specialists.       --Avtar Barrett MD      SUBJECTIVE:   This is my first time meeting Alcides. He's a healthy 36 yo who presents due to RIGHT ear with some popping sounds with pressure changes. This has been for 1-2 weeks. No pain.   No fevers or sweats or chills or rhinnorhea. Otherwise in his usual state of health    Review Of Systems:    See subjective.   Has otherwise been in usual state of health, e.g.   Cardiovascular: negative  Respiratory: No shortness of breath, dyspnea on exertion, cough, or hemoptysis  Gastrointestinal: negative  Genitourinary: negative    Problem list per EMR:  Patient Active Problem List   Diagnosis     Hemihypertrophy of lower extremity     Insomnia, unspecified type     Anal high risk HPV DNA test positive     Contact with and (suspected) exposure to human immunodeficiency virus (hiv)       Current Outpatient Medications   Medication Sig Dispense Refill     clonazePAM (KLONOPIN) 1 MG tablet Take 1 tablet (1 mg) by mouth nightly as needed for anxiety or sleep 30 tablet 0     emtricitabine-tenofovir (TRUVADA) 200-300 MG per tablet Take 1 tablet by mouth daily 90 tablet 0     Multiple Vitamins-Minerals (MULTIVITAMIN ADULT PO)          No Known Allergies     Social:   Works in [employer].  Trained at the [employer].  Lives in the building  above our clinic.      OBJECTIVE    Vitals: /69 (BP Location: Right arm, Patient Position: Sitting, Cuff Size: Adult Regular)   Pulse 51   Temp 97.8  F (36.6  C) (Skin)   Resp 15   Wt 68 kg (150 lb)   SpO2 98%   BMI 20.21 kg/m    BMI= Body mass index is 20.21 kg/m .  His ear exams are normal.  Tympanic membranes have a sharp light reflex.    Nasal passages are patent.    Oropharynx is clear    Neck is supple without lymphadenopathy    Cardiovascular-regular rate and rhythm without murmur.  Lungs are clear to auscultation    SEE TOP OF NOTE FOR ASSESSMENT AND PLAN    --Avtar Barrett MD  [facility], Department of Family Medicine and Community Health

## 2023-05-12 NOTE — NURSING NOTE
37 year old  Chief Complaint   Patient presents with     Ear Problem     Hearing popping sounds in right ear with pressure, denies pain, 1-2 weeks        Blood pressure 112/69, pulse 51, temperature 97.8  F (36.6  C), temperature source Skin, resp. rate 15, weight 68 kg (150 lb), SpO2 98 %. Body mass index is 20.21 kg/m .  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       Social History     Tobacco Use     Smoking status: Never     Smokeless tobacco: Never   Vaping Use     Vaping status: Never Used   Substance Use Topics     Alcohol use: Yes     Alcohol/week: 6.0 standard drinks of alcohol     Types: 6 Standard drinks or equivalent per week     Comment: 3-4 days a week, 1-2 drinks at a time     Drug use: No       Health Maintenance Due   Topic Date Due     ADVANCE CARE PLANNING  Never done     Pneumococcal Vaccine: Pediatrics (0 to 5 Years) and At-Risk Patients (6 to 64 Years) (1 - PCV) Never done     COVID-19 Vaccine (3 - Booster for Sandro series) 01/31/2022     INFLUENZA VACCINE (1) 09/01/2022     YEARLY PREVENTIVE VISIT  04/21/2023       No results found for: PAP      May 12, 2023 1:41 PM

## 2023-05-12 NOTE — PATIENT INSTRUCTIONS
ASSESSMENT/PLAN:    Alcides has some RIGHT ear popping with a normal exam.   No other symptoms.     Suggested   Short course of sudafed or other decongestant in case there is some Eustacian tube dysfunction.   Also, try some flonase nasal spray  Use Debrox ear drops in case there is some wax or dryness causing symptoms.   If symptoms persist, using above for at least 1-2 weeks, then send us a message and we can refer you to E.N.T. specialists.       --Avtar Barrett MD

## 2023-05-17 ENCOUNTER — LAB (OUTPATIENT)
Dept: LAB | Facility: CLINIC | Age: 38
End: 2023-05-17
Payer: COMMERCIAL

## 2023-05-17 DIAGNOSIS — Z20.6 CONTACT WITH AND (SUSPECTED) EXPOSURE TO HUMAN IMMUNODEFICIENCY VIRUS (HIV): ICD-10-CM

## 2023-05-17 DIAGNOSIS — Z11.3 ROUTINE SCREENING FOR STI (SEXUALLY TRANSMITTED INFECTION): ICD-10-CM

## 2023-05-17 PROCEDURE — 87389 HIV-1 AG W/HIV-1&-2 AB AG IA: CPT

## 2023-05-17 PROCEDURE — 86780 TREPONEMA PALLIDUM: CPT

## 2023-05-17 PROCEDURE — 87591 N.GONORRHOEAE DNA AMP PROB: CPT

## 2023-05-22 ENCOUNTER — MYC REFILL (OUTPATIENT)
Dept: FAMILY MEDICINE | Facility: CLINIC | Age: 38
End: 2023-05-22

## 2023-05-22 DIAGNOSIS — Z20.6 CONTACT WITH AND (SUSPECTED) EXPOSURE TO HUMAN IMMUNODEFICIENCY VIRUS (HIV): ICD-10-CM

## 2023-05-22 RX ORDER — EMTRICITABINE AND TENOFOVIR DISOPROXIL FUMARATE 200; 300 MG/1; MG/1
1 TABLET, FILM COATED ORAL DAILY
Qty: 90 TABLET | Refills: 0 | Status: SHIPPED | OUTPATIENT
Start: 2023-05-22 | End: 2023-08-13

## 2023-05-22 NOTE — TELEPHONE ENCOUNTER
Medication requested: emtricitabine-tenofovir (TRUVADA) 200-300 MG per tablet  Last office visit: 5/12/23  [facility] appointments: 8/11/23  Medication last refilled: 2/24/23; 90 + 0 refills  Last qualifying labs:   Component      Latest Ref Rng 5/17/2023  10:24 AM   HIV Antigen Antibody Combo      Nonreactive  Nonreactive      Component      Latest Ref Rng 2/23/2023  2:50 PM   Creatinine      0.67 - 1.17 mg/dL 0.98      Prescription approved per [facility] Refill Protocol.    Manuel REED, RN  05/22/23 10:10 AM

## 2023-06-03 ENCOUNTER — HEALTH MAINTENANCE LETTER (OUTPATIENT)
Age: 38
End: 2023-06-03

## 2023-06-28 ENCOUNTER — MYC MEDICAL ADVICE (OUTPATIENT)
Dept: FAMILY MEDICINE | Facility: CLINIC | Age: 38
End: 2023-06-28

## 2023-06-28 DIAGNOSIS — Z20.2 EXPOSURE TO CHLAMYDIA: Primary | ICD-10-CM

## 2023-06-28 NOTE — CONFIDENTIAL NOTE
Called patient to clarify situation.  Unsure about the timeline of when this sexual partner caught chlamydia and when they last had sex with Alcides.  We discussed empiric antibiotics for chlamydia exposure and the rationale  Ultimately decided to start with triple site testing, which I've ordered    Aries Swain MD on 6/28/2023 at 4:49 PM

## 2023-06-29 ENCOUNTER — LAB (OUTPATIENT)
Dept: LAB | Facility: CLINIC | Age: 38
End: 2023-06-29
Payer: COMMERCIAL

## 2023-06-29 DIAGNOSIS — Z20.2 EXPOSURE TO CHLAMYDIA: ICD-10-CM

## 2023-06-29 PROCEDURE — 87591 N.GONORRHOEAE DNA AMP PROB: CPT

## 2023-06-30 LAB
C TRACH DNA SPEC QL PROBE+SIG AMP: NEGATIVE
N GONORRHOEA DNA SPEC QL NAA+PROBE: NEGATIVE

## 2023-08-06 NOTE — PROGRESS NOTES
SUBJECTIVE:   CC: Alcides is an 37 year old who presents for preventative health visit.     Healthy Habits:     Getting at least 3 servings of Calcium per day:  Yes    Bi-annual eye exam:  Yes    Dental care twice a year:  Yes    Sleep apnea or symptoms of sleep apnea:  None    Diet:  Regular (no restrictions)    Frequency of exercise:  4-5 days/week    Duration of exercise:  30-45 minutes    Taking medications regularly:  No    Barriers to taking medications:  Not applicable    Medication side effects:  None    Additional concerns today:  No    - Exercises regularly - swims or strength training, alternates    Have you ever done Advance Care Planning? (For example, a Health Directive, POLST, or a discussion with a medical provider or your loved ones about your wishes): No, advance care planning information given to patient to review.  Patient plans to discuss their wishes with loved ones or provider.      # HIV Pre-exposure Prophylaxis  - Risk Factors for HIV infection: in an open relationship, both have outside partners, condoms sometime with outside partners  - no history of STDs other than HPV  - PrEP Start Date: 2021, Truvada  - Missed doses?: none in the past month  - HBV testin22 sAg/cAb nonreactive, sAb reactive  - HCV testin22 Ab nonreactive  - HAV immunization: first dose today   - HPV immunization: yes, 3 doses on file with [facility]  - MPOX immunization: yes, 2 doses on file with [facility]    Creatinine   Date Value Ref Range Status   2023 0.98 0.67 - 1.17 mg/dL Final   2022 1.05 0.67 - 1.17 mg/dL Final   2022 0.95 0.66 - 1.25 mg/dL Final   2021 0.99 0.66 - 1.25 mg/dL Final   2019 0.84 0.66 - 1.25 mg/dL Final     # Insomnia  - takes clonazepam 1mg as needed for sleep  - was put on clonazepam rather than a hypnotic because insomnia is anxiety-related  - Sleep has been much better since last visit   - Hasn't needed clonazepam lately, doesn't think he has taken this  year    # History of anal warts  - saw CRC recently for hemorrhoid, was recommended to have HPV testing given history of warts  - 3/30/22 screening anal cytology showed ASCUS, positive HPV 82v, weakly oncogenic type  - Had HRA in 04/2022 with Jannie Peoples, normal; repeat HRA in 1 year, not completed   - Patient has no new concerns; Encouraged following up for repeat HRA     Social History     Tobacco Use    Smoking status: Never    Smokeless tobacco: Never   Substance Use Topics    Alcohol use: Yes     Alcohol/week: 6.0 standard drinks of alcohol     Types: 6 Standard drinks or equivalent per week     Comment: 3-4 days a week, 1-2 drinks at a time           8/10/2023     9:31 AM   Alcohol Use   Prescreen: >3 drinks/day or >7 drinks/week? No     Last PSA: No results found for: PSA    Reviewed orders with patient. Reviewed health maintenance and updated orders accordingly - Yes    Reviewed and updated as needed this visit by clinical staff   Tobacco  Allergies  Meds   Med Hx  Surg Hx  Fam Hx  Soc Hx        Reviewed and updated as needed this visit by Provider   Tobacco     Med Hx  Surg Hx  Fam Hx  Soc Hx        Review of Systems   Constitutional:  Negative for chills and fever.   HENT:  Negative for congestion, ear pain, hearing loss and sore throat.    Eyes:  Negative for pain and visual disturbance.   Respiratory:  Negative for cough and shortness of breath.    Cardiovascular:  Positive for peripheral edema. Negative for chest pain and palpitations.   Gastrointestinal:  Negative for abdominal pain, constipation, diarrhea, heartburn, hematochezia and nausea.   Genitourinary:  Negative for dysuria, frequency, genital sores, hematuria, impotence, penile discharge and urgency.   Musculoskeletal:  Positive for arthralgias. Negative for joint swelling and myalgias.   Skin:  Negative for rash.   Neurological:  Negative for dizziness, weakness, headaches and paresthesias.   Psychiatric/Behavioral:   "Negative for mood changes. The patient is not nervous/anxious.        OBJECTIVE:   /67   Pulse 65   Temp 97.8  F (36.6  C)   Ht 1.837 m (6' 0.32\")   Wt 66.7 kg (147 lb 1.9 oz)   SpO2 95%   BMI 19.78 kg/m      Physical Exam  GENERAL: healthy, alert and no distress  EYES: Eyes grossly normal to inspection, PERRL and conjunctivae and sclerae normal  HENT: ear canals and TM's normal, nose and mouth without ulcers or lesions  NECK: no adenopathy, no asymmetry, masses, or scars and thyroid normal to palpation  RESP: lungs clear to auscultation - no rales, rhonchi or wheezes  CV: regular rate and rhythm, normal S1 S2, no S3 or S4, no murmur, click or rub, no peripheral edema and peripheral pulses strong  ABDOMEN: soft, nontender, no hepatosplenomegaly, no masses and bowel sounds normal  MS: no gross musculoskeletal defects noted, no edema  SKIN: no suspicious lesions or rashes  NEURO: Normal strength and tone, mentation intact and speech normal  PSYCH: mentation appears normal, affect normal/bright    ASSESSMENT/PLAN:   (Z00.00) Annual physical exam  (primary encounter diagnosis)  Comment: Age appropriate screening and preventative services provided.   Plan: N/A    (Z20.6) Contact with and (suspected) exposure to human immunodeficiency virus (hiv)  Plan: HIV Antigen Antibody Combo           (Z11.3) Routine screening for STI (sexually transmitted infection)  Plan: Treponema Abs w Reflex to RPR and Titer,         Chlamydia trachomatis/Neisseria gonorrhoeae by         PCR, Chlamydia trachomatis/Neisseria         gonorrhoeae by PCR, Chlamydia         trachomatis/Neisseria gonorrhoeae by PCR            (D72.819) Leukopenia, unspecified type  Comment: Chronic mild leukopenia, unclear etiology. Update labs today.   Plan: CBC with platelets differential          (Z23) Need for hepatitis A immunization  Plan: HEPA VACCINE ADULT IM    COUNSELING:   Reviewed preventive health counseling, as reflected in patient " instructions    He reports that he has never smoked. He has never used smokeless tobacco.      Patient was seen and discussed with attending physician, Dr. Swain.  Aishwarya Turner, MS3     I was present with the medical student who participated in the service and in the documentation of the note. I have verified the history and personally performed the physical exam and medical decision making. I agree with the assessment and plan of care as documented in the note with the following additions:    (Z20.6) Contact with and (suspected) exposure to human immunodeficiency virus (hiv)  Comment: Doing well on PrEP - plan to continue.   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 Annual monitoring. Heme referral if ANC drops <1000.   Plan: CBC with platelets differential          (Z23) Need for hepatitis A immunization  Comment: Plan: HEPA VACCINE ADULT IM          (R85.81) Anal high risk HPV DNA test positive  Comment: Reminded of need and rationale for CRS follow up for HRA. Gave phone number to contact.   Plan:     Aries Swain MD  1:21 PM, August 11, 2023

## 2023-08-10 ASSESSMENT — ENCOUNTER SYMPTOMS
FREQUENCY: 0
SORE THROAT: 0
PARESTHESIAS: 0
EYE PAIN: 0
MYALGIAS: 0
CONSTIPATION: 0
DIARRHEA: 0
ARTHRALGIAS: 1
CHILLS: 0
HEMATURIA: 0
ABDOMINAL PAIN: 0
PALPITATIONS: 0
HEADACHES: 0
WEAKNESS: 0
HEMATOCHEZIA: 0
SHORTNESS OF BREATH: 0
HEARTBURN: 0
NERVOUS/ANXIOUS: 0
JOINT SWELLING: 0
COUGH: 0
NAUSEA: 0
FEVER: 0
DIZZINESS: 0
DYSURIA: 0

## 2023-08-11 ENCOUNTER — OFFICE VISIT (OUTPATIENT)
Dept: FAMILY MEDICINE | Facility: CLINIC | Age: 38
End: 2023-08-11
Payer: COMMERCIAL

## 2023-08-11 VITALS
SYSTOLIC BLOOD PRESSURE: 116 MMHG | HEIGHT: 72 IN | DIASTOLIC BLOOD PRESSURE: 67 MMHG | TEMPERATURE: 97.8 F | WEIGHT: 147.12 LBS | BODY MASS INDEX: 19.93 KG/M2 | OXYGEN SATURATION: 95 % | HEART RATE: 65 BPM

## 2023-08-11 DIAGNOSIS — D72.819 LEUKOPENIA, UNSPECIFIED TYPE: ICD-10-CM

## 2023-08-11 DIAGNOSIS — R85.81 ANAL HIGH RISK HPV DNA TEST POSITIVE: Chronic | ICD-10-CM

## 2023-08-11 DIAGNOSIS — Z20.6 CONTACT WITH AND (SUSPECTED) EXPOSURE TO HUMAN IMMUNODEFICIENCY VIRUS (HIV): ICD-10-CM

## 2023-08-11 DIAGNOSIS — Z11.3 ROUTINE SCREENING FOR STI (SEXUALLY TRANSMITTED INFECTION): ICD-10-CM

## 2023-08-11 DIAGNOSIS — Z23 NEED FOR HEPATITIS A IMMUNIZATION: ICD-10-CM

## 2023-08-11 DIAGNOSIS — Z00.00 ANNUAL PHYSICAL EXAM: Primary | ICD-10-CM

## 2023-08-11 LAB
BASO+EOS+MONOS # BLD AUTO: 0.3 10E3/UL (ref 0–2.2)
BASO+EOS+MONOS NFR BLD AUTO: 10 %
ERYTHROCYTE [DISTWIDTH] IN BLOOD BY AUTOMATED COUNT: 13.7 % (ref 10–15)
HCT VFR BLD AUTO: 44.5 % (ref 40–53)
HGB BLD-MCNC: 14.7 G/DL (ref 13.3–17.7)
HIV 1+2 AB+HIV1 P24 AG SERPL QL IA: NONREACTIVE
LYMPHOCYTES # BLD AUTO: 1.1 10E3/UL (ref 0.8–5.3)
LYMPHOCYTES NFR BLD AUTO: 41 %
MCH RBC QN AUTO: 31.6 PG (ref 26.5–33)
MCHC RBC AUTO-ENTMCNC: 33 G/DL (ref 31.5–36.5)
MCV RBC AUTO: 96 FL (ref 78–100)
NEUTROPHILS # BLD AUTO: 1.3 10E3/UL (ref 1.6–8.3)
NEUTROPHILS NFR BLD AUTO: 49 %
PLATELET # BLD AUTO: 210 10E3/UL (ref 150–450)
RBC # BLD AUTO: 4.65 10E6/UL (ref 4.4–5.9)
WBC # BLD AUTO: 2.7 10E3/UL (ref 4–11)

## 2023-08-11 PROCEDURE — 87491 CHLMYD TRACH DNA AMP PROBE: CPT | Performed by: FAMILY MEDICINE

## 2023-08-11 PROCEDURE — 87591 N.GONORRHOEAE DNA AMP PROB: CPT | Performed by: FAMILY MEDICINE

## 2023-08-11 PROCEDURE — 86780 TREPONEMA PALLIDUM: CPT | Performed by: FAMILY MEDICINE

## 2023-08-11 PROCEDURE — 87389 HIV-1 AG W/HIV-1&-2 AB AG IA: CPT | Performed by: FAMILY MEDICINE

## 2023-08-11 RX ORDER — EMTRICITABINE AND TENOFOVIR DISOPROXIL FUMARATE 200; 300 MG/1; MG/1
1 TABLET, FILM COATED ORAL DAILY
Qty: 90 TABLET | Refills: 0 | Status: CANCELLED | OUTPATIENT
Start: 2023-08-11

## 2023-08-11 NOTE — PATIENT INSTRUCTIONS
1) Call Jannie Peoples' office to schedule a repeat High Resolution Anoscopy, [phone]    2) Complete a health care directive and return a copy to me (mail or mychart is fine)  We have a notary here, [place], just call ahead if you want to sign in front of her    3) Get the new COVID booster this Fall with your flu shot

## 2023-08-11 NOTE — NURSING NOTE
"37 year old  Chief Complaint   Patient presents with    Physical       Blood pressure 116/67, pulse 65, temperature 97.8  F (36.6  C), height 1.837 m (6' 0.32\"), weight 66.7 kg (147 lb 1.9 oz), SpO2 95 %. Body mass index is 19.78 kg/m .  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       Social History     Tobacco Use    Smoking status: Never    Smokeless tobacco: Never   Vaping Use    Vaping Use: Never used   Substance Use Topics    Alcohol use: Yes     Alcohol/week: 6.0 standard drinks of alcohol     Types: 6 Standard drinks or equivalent per week     Comment: 3-4 days a week, 1-2 drinks at a time    Drug use: No       Health Maintenance Due   Topic Date Due    ADVANCE CARE PLANNING  Never done    Pneumococcal Vaccine: Pediatrics (0 to 5 Years) and At-Risk Patients (6 to 64 Years) (1 - PCV) Never done    HEPATITIS A IMMUNIZATION (1 of 2 - Risk 2-dose series) Never done    COVID-19 Vaccine (3 - Booster for Sandro series) 01/31/2022    YEARLY PREVENTIVE VISIT  04/21/2023       No results found for: PAP      August 11, 2023 7:53 AM    "

## 2023-08-11 NOTE — NURSING NOTE
Prior to immunization administration, verified patients identity using patient s name and date of birth. Please see Immunization Activity for additional information.     Screening Questionnaire for Adult Immunization    Are you sick today?   No   Do you have allergies to medications, food, a vaccine component or latex?   No   Have you ever had a serious reaction after receiving a vaccination?   No   Do you have a long-term health problem with heart, lung, kidney, or metabolic disease (e.g., diabetes), asthma, a blood disorder, no spleen, complement component deficiency, a cochlear implant, or a spinal fluid leak?  Are you on long-term aspirin therapy?   No   Do you have cancer, leukemia, HIV/AIDS, or any other immune system problem?   No   Do you have a parent, brother, or sister with an immune system problem?   No   In the past 3 months, have you taken medications that affect  your immune system, such as prednisone, other steroids, or anticancer drugs; drugs for the treatment of rheumatoid arthritis, Crohn s disease, or psoriasis; or have you had radiation treatments?   No   Have you had a seizure, or a brain or other nervous system problem?   No   During the past year, have you received a transfusion of blood or blood    products, or been given immune (gamma) globulin or antiviral drug?   No   For women: Are you pregnant or is there a chance you could become       pregnant during the next month?   No   Have you received any vaccinations in the past 4 weeks?   No     Immunization questionnaire answers were all negative.      Patient instructed to remain in clinic for 15 minutes afterwards, and to report any adverse reactions.     Screening performed by My Flores MA on 8/11/2023 at 8:38 AM.

## 2023-08-12 LAB
C TRACH DNA SPEC QL PROBE+SIG AMP: NEGATIVE
N GONORRHOEA DNA SPEC QL NAA+PROBE: NEGATIVE
T PALLIDUM AB SER QL: NONREACTIVE

## 2023-08-13 DIAGNOSIS — Z20.6 CONTACT WITH AND (SUSPECTED) EXPOSURE TO HUMAN IMMUNODEFICIENCY VIRUS (HIV): ICD-10-CM

## 2023-08-13 PROBLEM — D70.0 CONGENITAL NEUTROPENIA (H): Chronic | Status: ACTIVE | Noted: 2023-08-13

## 2023-08-13 RX ORDER — EMTRICITABINE AND TENOFOVIR DISOPROXIL FUMARATE 200; 300 MG/1; MG/1
1 TABLET, FILM COATED ORAL DAILY
Qty: 90 TABLET | Refills: 0 | Status: SHIPPED | OUTPATIENT
Start: 2023-08-13 | End: 2023-11-21

## 2023-11-17 ENCOUNTER — MYC REFILL (OUTPATIENT)
Dept: FAMILY MEDICINE | Facility: CLINIC | Age: 38
End: 2023-11-17

## 2023-11-17 ENCOUNTER — ALLIED HEALTH/NURSE VISIT (OUTPATIENT)
Dept: FAMILY MEDICINE | Facility: CLINIC | Age: 38
End: 2023-11-17
Payer: COMMERCIAL

## 2023-11-17 DIAGNOSIS — Z29.81 ENCOUNTER FOR HIV PRE-EXPOSURE PROPHYLAXIS: ICD-10-CM

## 2023-11-17 DIAGNOSIS — Z20.6 CONTACT WITH AND (SUSPECTED) EXPOSURE TO HUMAN IMMUNODEFICIENCY VIRUS (HIV): ICD-10-CM

## 2023-11-17 DIAGNOSIS — D70.9 NEUTROPENIA, UNSPECIFIED TYPE (H): Primary | ICD-10-CM

## 2023-11-17 DIAGNOSIS — Z23 NEED FOR VACCINATION: Primary | ICD-10-CM

## 2023-11-17 DIAGNOSIS — Z11.3 ROUTINE SCREENING FOR STI (SEXUALLY TRANSMITTED INFECTION): ICD-10-CM

## 2023-11-17 DIAGNOSIS — Z11.59 ENCOUNTER FOR HEPATITIS C SCREENING TEST FOR LOW RISK PATIENT: ICD-10-CM

## 2023-11-17 RX ORDER — EMTRICITABINE AND TENOFOVIR DISOPROXIL FUMARATE 200; 300 MG/1; MG/1
1 TABLET, FILM COATED ORAL DAILY
Qty: 90 TABLET | Refills: 0 | Status: CANCELLED | OUTPATIENT
Start: 2023-11-17

## 2023-11-17 NOTE — PROGRESS NOTES
Prior to immunization administration, verified patients identity using patient s name and date of birth. Please see Immunization Activity for additional information.     Screening Questionnaire for Adult Immunization    Are you sick today?   No   Do you have allergies to medications, food, a vaccine component or latex?   No   Have you ever had a serious reaction after receiving a vaccination?   No   Do you have a long-term health problem with heart, lung, kidney, or metabolic disease (e.g., diabetes), asthma, a blood disorder, no spleen, complement component deficiency, a cochlear implant, or a spinal fluid leak?  Are you on long-term aspirin therapy?   No   Do you have cancer, leukemia, HIV/AIDS, or any other immune system problem?   No   Do you have a parent, brother, or sister with an immune system problem?   No   In the past 3 months, have you taken medications that affect  your immune system, such as prednisone, other steroids, or anticancer drugs; drugs for the treatment of rheumatoid arthritis, Crohn s disease, or psoriasis; or have you had radiation treatments?   No   Have you had a seizure, or a brain or other nervous system problem?   No   During the past year, have you received a transfusion of blood or blood    products, or been given immune (gamma) globulin or antiviral drug?   No   For women: Are you pregnant or is there a chance you could become       pregnant during the next month?   No   Have you received any vaccinations in the past 4 weeks?   No     Immunization questionnaire answers were all negative.    I have reviewed the following standing orders:   This patient is due and qualifies for the Covid-19 vaccine.     Click here for COVID-19 Standing Order    I have reviewed the vaccines inclusion and exclusion criteria; No concerns regarding eligibility.     This patient is due and qualifies for the Influenza vaccine.    Click here for Influenza Vaccine Standing Order    I have reviewed the  vaccines inclusion and exclusion criteria; No concerns regarding eligibility.     Patient instructed to remain in clinic for 15 minutes afterwards, and to report any adverse reactions.     Screening performed by Celena Mcguire LPN on 11/17/2023 at 9:47 AM.

## 2023-11-20 ENCOUNTER — LAB (OUTPATIENT)
Dept: LAB | Facility: CLINIC | Age: 38
End: 2023-11-20
Payer: COMMERCIAL

## 2023-11-20 DIAGNOSIS — D70.9 NEUTROPENIA, UNSPECIFIED TYPE (H): ICD-10-CM

## 2023-11-20 DIAGNOSIS — Z29.81 ENCOUNTER FOR HIV PRE-EXPOSURE PROPHYLAXIS: ICD-10-CM

## 2023-11-20 DIAGNOSIS — Z11.59 ENCOUNTER FOR HEPATITIS C SCREENING TEST FOR LOW RISK PATIENT: ICD-10-CM

## 2023-11-20 DIAGNOSIS — D70.9 NEUTROPENIA, UNSPECIFIED TYPE (H): Primary | ICD-10-CM

## 2023-11-20 DIAGNOSIS — Z20.6 CONTACT WITH AND (SUSPECTED) EXPOSURE TO HUMAN IMMUNODEFICIENCY VIRUS (HIV): ICD-10-CM

## 2023-11-20 DIAGNOSIS — Z11.3 ROUTINE SCREENING FOR STI (SEXUALLY TRANSMITTED INFECTION): ICD-10-CM

## 2023-11-20 LAB
BASO+EOS+MONOS # BLD AUTO: 0.4 10E3/UL (ref 0–2.2)
BASO+EOS+MONOS NFR BLD AUTO: 17 %
BASOPHILS # BLD AUTO: ABNORMAL 10*3/UL
BASOPHILS NFR BLD AUTO: ABNORMAL %
EOSINOPHIL # BLD AUTO: ABNORMAL 10*3/UL
EOSINOPHIL NFR BLD AUTO: ABNORMAL %
ERYTHROCYTE [DISTWIDTH] IN BLOOD BY AUTOMATED COUNT: 13.6 % (ref 10–15)
HCT VFR BLD AUTO: 45.5 % (ref 40–53)
HCV AB SERPL QL IA: NONREACTIVE
HGB BLD-MCNC: 14.7 G/DL (ref 13.3–17.7)
HIV 1+2 AB+HIV1 P24 AG SERPL QL IA: NONREACTIVE
IMM GRANULOCYTES # BLD: ABNORMAL 10*3/UL
IMM GRANULOCYTES NFR BLD: ABNORMAL %
LYMPHOCYTES # BLD AUTO: 1.1 10E3/UL (ref 0.8–5.3)
LYMPHOCYTES NFR BLD AUTO: 51 %
MCH RBC QN AUTO: 31.4 PG (ref 26.5–33)
MCHC RBC AUTO-ENTMCNC: 32.3 G/DL (ref 31.5–36.5)
MCV RBC AUTO: 97 FL (ref 78–100)
MONOCYTES # BLD AUTO: ABNORMAL 10*3/UL
MONOCYTES NFR BLD AUTO: ABNORMAL %
NEUTROPHILS # BLD AUTO: 0.7 10E3/UL (ref 1.6–8.3)
NEUTROPHILS NFR BLD AUTO: 33 %
PLATELET # BLD AUTO: 175 10E3/UL (ref 150–450)
RBC # BLD AUTO: 4.68 10E6/UL (ref 4.4–5.9)
T PALLIDUM AB SER QL: NONREACTIVE
WBC # BLD AUTO: 2.2 10E3/UL (ref 4–11)

## 2023-11-21 LAB
C TRACH DNA SPEC QL PROBE+SIG AMP: NEGATIVE
N GONORRHOEA DNA SPEC QL NAA+PROBE: NEGATIVE

## 2023-11-21 RX ORDER — EMTRICITABINE AND TENOFOVIR DISOPROXIL FUMARATE 200; 300 MG/1; MG/1
1 TABLET, FILM COATED ORAL DAILY
Qty: 90 TABLET | Refills: 0 | Status: SHIPPED | OUTPATIENT
Start: 2023-11-21 | End: 2024-02-20

## 2023-11-22 LAB — HIV1 RNA BLD QL NAA+PROBE: NOT DETECTED

## 2024-01-23 NOTE — PROGRESS NOTES
Assessment & Plan   Problem List Items Addressed This Visit    None  Visit Diagnoses       Altered bowel function    -  Primary           Reassured Alcides that changes all fall within normal bowel function. Patterns can change for multiple reasons including diet, hydration, medication, mental health. Encouraged Alcides to contact this clinic if symptoms should worsen acutely.    28 minutes spent on the date of the encounter doing chart review, history and exam, documentation and further activities as noted.    Lewis Andrade MD  3:01 PM, January 24, 2024        Subjective   Alcides is a 38 year old, presenting for the following health issues:  Gastrointestinal Problem (BM changes -- see [employer] message. Issues have gotten better since making the appointment, but due to the length of sx wanted to still get checked out.) and Consult (Allergy testing. PREP concerns, see [employer] message.)    HPI   BM changes  - [employer] message of 1/18/24  I ve noticed bowel changes (softer stool) in the past few months that I cannot attribute to changes in diet or otherwise. I ve tried fiber supplements but am unable to correct the change. I am wondering if I can do a panel of allergens at your clinic? Also, I noticed the formulation/ of my PrEP changed in recent months so asking my pharmacy if they have a timeline on that; I recall having issues with that previously.   Today  - Alcides reports feeling like his stool has started looking more like what he is used to over the past few days.   - based upon a [place] stool chart, Alcides describes a change between 3 and 4  - no blood in stool, no pain with BMs, no diarrhea or constipation        Review of Systems  Constitutional, HEENT, cardiovascular, pulmonary, gi and gu systems are negative, except as otherwise noted.      Objective    /66 (BP Location: Left arm, Patient Position: Sitting, Cuff Size: Adult Regular)   Pulse 59   Temp 98.2  F (36.8  C) (Temporal)   Resp 16   Wt  66.9 kg (147 lb 8 oz)   SpO2 97%   BMI 19.83 kg/m    Body mass index is 19.83 kg/m .  Physical Exam   GENERAL: alert and no distress  NECK: no adenopathy, no asymmetry, masses, or scars  RESP: lungs clear to auscultation - no rales, rhonchi or wheezes  CV: regular rate and rhythm, normal S1 S2, no S3 or S4, no murmur, click or rub, no peripheral edema  ABDOMEN: soft, nontender, no hepatosplenomegaly, no masses and bowel sounds normal  MS: no gross musculoskeletal defects noted, no edema            Signed Electronically by: Lewis Andrade MD

## 2024-01-24 ENCOUNTER — OFFICE VISIT (OUTPATIENT)
Dept: FAMILY MEDICINE | Facility: CLINIC | Age: 39
End: 2024-01-24
Payer: COMMERCIAL

## 2024-01-24 VITALS
OXYGEN SATURATION: 97 % | HEART RATE: 59 BPM | WEIGHT: 147.5 LBS | TEMPERATURE: 98.2 F | RESPIRATION RATE: 16 BRPM | BODY MASS INDEX: 19.83 KG/M2 | SYSTOLIC BLOOD PRESSURE: 102 MMHG | DIASTOLIC BLOOD PRESSURE: 66 MMHG

## 2024-01-24 DIAGNOSIS — R19.8 ALTERED BOWEL FUNCTION: Primary | ICD-10-CM

## 2024-02-12 ENCOUNTER — MYC REFILL (OUTPATIENT)
Dept: FAMILY MEDICINE | Facility: CLINIC | Age: 39
End: 2024-02-12

## 2024-02-12 DIAGNOSIS — Z29.81 ENCOUNTER FOR HIV PRE-EXPOSURE PROPHYLAXIS: Primary | ICD-10-CM

## 2024-02-12 DIAGNOSIS — Z11.3 ROUTINE SCREENING FOR STI (SEXUALLY TRANSMITTED INFECTION): ICD-10-CM

## 2024-02-12 DIAGNOSIS — Z20.6 CONTACT WITH AND (SUSPECTED) EXPOSURE TO HUMAN IMMUNODEFICIENCY VIRUS (HIV): ICD-10-CM

## 2024-02-12 RX ORDER — EMTRICITABINE AND TENOFOVIR DISOPROXIL FUMARATE 200; 300 MG/1; MG/1
1 TABLET, FILM COATED ORAL DAILY
Qty: 90 TABLET | Refills: 0 | Status: CANCELLED | OUTPATIENT
Start: 2024-02-12

## 2024-02-19 ENCOUNTER — LAB (OUTPATIENT)
Dept: LAB | Facility: CLINIC | Age: 39
End: 2024-02-19
Payer: COMMERCIAL

## 2024-02-19 DIAGNOSIS — Z11.3 ROUTINE SCREENING FOR STI (SEXUALLY TRANSMITTED INFECTION): ICD-10-CM

## 2024-02-19 DIAGNOSIS — D70.9 NEUTROPENIA, UNSPECIFIED TYPE (H): ICD-10-CM

## 2024-02-19 DIAGNOSIS — Z29.81 ENCOUNTER FOR HIV PRE-EXPOSURE PROPHYLAXIS: ICD-10-CM

## 2024-02-19 LAB
BASOPHILS # BLD AUTO: 0 10E3/UL (ref 0–0.2)
BASOPHILS NFR BLD AUTO: 2 %
CREAT SERPL-MCNC: 1.12 MG/DL (ref 0.67–1.17)
EGFRCR SERPLBLD CKD-EPI 2021: 85 ML/MIN/1.73M2
EOSINOPHIL # BLD AUTO: 0.1 10E3/UL (ref 0–0.7)
EOSINOPHIL NFR BLD AUTO: 3 %
ERYTHROCYTE [DISTWIDTH] IN BLOOD BY AUTOMATED COUNT: 13.1 % (ref 10–15)
FOLATE SERPL-MCNC: 5.4 NG/ML (ref 4.6–34.8)
HCT VFR BLD AUTO: 43 % (ref 40–53)
HGB BLD-MCNC: 14.6 G/DL (ref 13.3–17.7)
HIV 1+2 AB+HIV1 P24 AG SERPL QL IA: NONREACTIVE
IMM GRANULOCYTES # BLD: 0 10E3/UL
IMM GRANULOCYTES NFR BLD: 0 %
LYMPHOCYTES # BLD AUTO: 1.3 10E3/UL (ref 0.8–5.3)
LYMPHOCYTES NFR BLD AUTO: 54 %
MCH RBC QN AUTO: 32.2 PG (ref 26.5–33)
MCHC RBC AUTO-ENTMCNC: 34 G/DL (ref 31.5–36.5)
MCV RBC AUTO: 95 FL (ref 78–100)
MONOCYTES # BLD AUTO: 0.2 10E3/UL (ref 0–1.3)
MONOCYTES NFR BLD AUTO: 9 %
NEUTROPHILS # BLD AUTO: 0.8 10E3/UL (ref 1.6–8.3)
NEUTROPHILS NFR BLD AUTO: 32 %
NRBC # BLD AUTO: 0 10E3/UL
NRBC BLD AUTO-RTO: 0 /100
PLATELET # BLD AUTO: 160 10E3/UL (ref 150–450)
RBC # BLD AUTO: 4.53 10E6/UL (ref 4.4–5.9)
RETICS # AUTO: 0.04 10E6/UL (ref 0.03–0.1)
RETICS/RBC NFR AUTO: 0.9 % (ref 0.5–2)
T PALLIDUM AB SER QL: NONREACTIVE
VIT B12 SERPL-MCNC: 660 PG/ML (ref 232–1245)
WBC # BLD AUTO: 2.4 10E3/UL (ref 4–11)

## 2024-02-19 PROCEDURE — 87389 HIV-1 AG W/HIV-1&-2 AB AG IA: CPT

## 2024-02-19 PROCEDURE — 87491 CHLMYD TRACH DNA AMP PROBE: CPT

## 2024-02-19 PROCEDURE — 86780 TREPONEMA PALLIDUM: CPT

## 2024-02-19 PROCEDURE — 85045 AUTOMATED RETICULOCYTE COUNT: CPT

## 2024-02-19 PROCEDURE — 82607 VITAMIN B-12: CPT

## 2024-02-19 PROCEDURE — 87535 HIV-1 PROBE&REVERSE TRNSCRPJ: CPT

## 2024-02-19 PROCEDURE — 82565 ASSAY OF CREATININE: CPT

## 2024-02-19 PROCEDURE — 85025 COMPLETE CBC W/AUTO DIFF WBC: CPT

## 2024-02-19 PROCEDURE — 82746 ASSAY OF FOLIC ACID SERUM: CPT

## 2024-02-19 PROCEDURE — 82525 ASSAY OF COPPER: CPT

## 2024-02-20 DIAGNOSIS — Z20.6 CONTACT WITH AND (SUSPECTED) EXPOSURE TO HUMAN IMMUNODEFICIENCY VIRUS (HIV): ICD-10-CM

## 2024-02-20 LAB
C TRACH DNA SPEC QL PROBE+SIG AMP: NEGATIVE
N GONORRHOEA DNA SPEC QL NAA+PROBE: NEGATIVE
PATH REPORT.COMMENTS IMP SPEC: NORMAL
PATH REPORT.FINAL DX SPEC: NORMAL
PATH REPORT.MICROSCOPIC SPEC OTHER STN: NORMAL
PATH REPORT.MICROSCOPIC SPEC OTHER STN: NORMAL

## 2024-02-20 RX ORDER — EMTRICITABINE AND TENOFOVIR DISOPROXIL FUMARATE 200; 300 MG/1; MG/1
1 TABLET, FILM COATED ORAL DAILY
Qty: 90 TABLET | Refills: 0 | Status: SHIPPED | OUTPATIENT
Start: 2024-02-20 | End: 2024-05-01

## 2024-02-21 LAB — COPPER SERPL-MCNC: 64.4 UG/DL

## 2024-02-22 LAB — HIV1 RNA BLD QL NAA+PROBE: NOT DETECTED

## 2024-02-26 PROBLEM — D70.8 OTHER NEUTROPENIA (H): Chronic | Status: ACTIVE | Noted: 2023-08-13

## 2024-05-01 ENCOUNTER — MYC MEDICAL ADVICE (OUTPATIENT)
Dept: FAMILY MEDICINE | Facility: CLINIC | Age: 39
End: 2024-05-01

## 2024-05-01 DIAGNOSIS — Z20.6 CONTACT WITH AND (SUSPECTED) EXPOSURE TO HUMAN IMMUNODEFICIENCY VIRUS (HIV): Primary | ICD-10-CM

## 2024-05-01 DIAGNOSIS — Z11.3 ROUTINE SCREENING FOR STI (SEXUALLY TRANSMITTED INFECTION): ICD-10-CM

## 2024-05-01 NOTE — TELEPHONE ENCOUNTER
Medication requested: emtricitabine-tenofovir (TRUVADA) 200-300 MG per tablet   Last office visit: 1/24/24  [facility] appointments: none  Medication last refilled: 2/20/24; 90 + 0 refills  Last qualifying labs: to be collected    Sent message to pt to schedule appointment for PrEP labs.    Manuel REED, RN  05/01/24 8:56 AM

## 2024-05-03 ENCOUNTER — LAB (OUTPATIENT)
Dept: LAB | Facility: CLINIC | Age: 39
End: 2024-05-03
Payer: COMMERCIAL

## 2024-05-03 DIAGNOSIS — Z11.3 ROUTINE SCREENING FOR STI (SEXUALLY TRANSMITTED INFECTION): ICD-10-CM

## 2024-05-03 DIAGNOSIS — Z20.6 CONTACT WITH AND (SUSPECTED) EXPOSURE TO HUMAN IMMUNODEFICIENCY VIRUS (HIV): ICD-10-CM

## 2024-05-05 LAB — HIV1 RNA BLD QL NAA+PROBE: NOT DETECTED

## 2024-05-06 RX ORDER — EMTRICITABINE AND TENOFOVIR DISOPROXIL FUMARATE 200; 300 MG/1; MG/1
1 TABLET, FILM COATED ORAL DAILY
Qty: 90 TABLET | Refills: 0 | Status: SHIPPED | OUTPATIENT
Start: 2024-05-06 | End: 2024-07-26

## 2024-05-06 NOTE — TELEPHONE ENCOUNTER
Component      Latest Ref Rng 5/3/2024  8:30 AM   HIV Antigen Antibody Combo      Nonreactive  Nonreactive       5/3/2024  8:30 AM   HIV-1 Qual by NAAT Not Detected      Component      Latest Ref Rng 2/19/2024  8:30 AM   Creatinine      0.67 - 1.17 mg/dL 1.12      Manuel REED, RN  05/06/24 8:02 AM

## 2024-06-25 ENCOUNTER — OFFICE VISIT (OUTPATIENT)
Dept: FAMILY MEDICINE | Facility: CLINIC | Age: 39
End: 2024-06-25
Payer: COMMERCIAL

## 2024-06-25 VITALS
TEMPERATURE: 98.4 F | HEART RATE: 75 BPM | OXYGEN SATURATION: 95 % | SYSTOLIC BLOOD PRESSURE: 93 MMHG | DIASTOLIC BLOOD PRESSURE: 60 MMHG

## 2024-06-25 DIAGNOSIS — H69.92 DYSFUNCTION OF LEFT EUSTACHIAN TUBE: Primary | ICD-10-CM

## 2024-06-25 NOTE — PATIENT INSTRUCTIONS
You ear pain/pressure is likely due to problems with your Eustachian tube.     Three things you can do to help this:  1) Use Afrin nose spray daily for 3 days. Do not use for longer than 3 days as it can cause rebound congestion  2) Use Flonase nose spray every day for 2 weeks  3) Gently plug your nose and blow into your fingers 3-4 times per day for the next 2 weeks.    Eustachian Tube Dysfunction (ETD)    The eustachian tube is a small tube that connects the back of the nose to the middle ear. Its purpose is to equalize air pressure across the eardrum so the middle ear can drain properly.     The eustachian tube normally remains closed, and opens with movements of the throat muscles, such as during swallowing, chewing, or yawning. Sometimes the tube can become blocked, swollen, or collapsed, making the tube difficult to open. Air and fluids then become trapped in the middle ear, causing a pressure change that affects eardrum movement. This affects hearing and causes sounds to be muffled or blocked, and can be uncomfortable.    What causes eustachian tube dysfunction (ETD)?  Eustachian tube dysfunction is commonly a result of upper respiratory infections ( cold ), or allergies. Some people who were born with a more narrow eustachian tube, or who have frequent  ear infections, may also experience ETD.    What are the symptoms?  pressure in the ear; similar to the feeling when changing altitude  occasional pain or discomfort  occasional ringing in the ears  mild balance disturbance; feeling dizzy or unsteady  nonspecific headaches or facial pain    How is ETD treated?  Because most symptoms are related to upper respiratory infections or a recent ear infection, ETD symptoms usually subside when the virus goes away.  Antibiotics are not necessary to treat ETD.    Treatments that may be beneficial include:  Use of an OTC nasal decongestant (Afrin ) or oral (pseudoephedrine), or if prescribed a  nasal steroid (Rx)  spray (such as  Flonase , Nasonex , Rhinocort )   Analgesics such as acetaminophen or ibuprofen as needed for pain relief.    For older children and teenagers- several times a day:   chew sugarless gum  suck on hard candy  blow up stiff balloons  use the forced Valsalva maneuver: by creating positive pressure against a closed nasal airway- squeeze your nostrils together with your fingers and then exhale forcefully through the nose.  You may feel a  popping  or  crackling  sensation which indicates that you are re-inflating the collapsed eustachian tube.    When should you consult us again?  Although complications are rare, consult a clinician if any of the following is experienced:  Persistent, severe or increasing ear pain  fever over 102 F despite aspirin, acetaminophen, or ibuprofen  bloody or pus-like drainage from the ear canal  severe dizziness  neck stiffness       What can improve ear discomfort when flying in airplanes?  Use of oral decongestants, antihistamines, and nasal decongestant sprays (such as Afrin  )  prior to flying may allow for easier pressure equalization.  Special types of ear plugs slow down the pressure changes from flying by allowing more time to equalize the pressure and may decrease ear pain.  (EarPlanes )  are available in drug stores and most airports.   When flying, try swallowing to open the eustachian tube or creating positive pressure against a closed nasal airway by squeezing the nostrils together with the fingers and then exhaling forcefully through the nose. This usually equalizes pressure in the middle ear to the outside. Do this frequently during the change in altitude (especially during the 15-30 minutes of descent for landing) to reduce large pressure differences. Chewing gum or sucking on hard candy can also help. If your infant is asleep during descent, wake them and encourage a bottle or breastfeeding.

## 2024-06-25 NOTE — NURSING NOTE
"38 year old  Chief Complaint   Patient presents with    Ear Problem     Left ear pain with movement of jaw and change in pressure. Has been going on for a couple weeks. For last year has hear small popping sounds.        Blood pressure 93/60, pulse 75, temperature 98.4  F (36.9  C), temperature source Skin, SpO2 95%. There is no height or weight on file to calculate BMI.  Patient Active Problem List   Diagnosis    Hemihypertrophy of lower extremity    Insomnia, unspecified type    Anal high risk HPV DNA test positive    Contact with and (suspected) exposure to human immunodeficiency virus (hiv)    Other neutropenia (H24)       Wt Readings from Last 2 Encounters:   01/24/24 66.9 kg (147 lb 8 oz)   08/11/23 66.7 kg (147 lb 1.9 oz)     BP Readings from Last 3 Encounters:   06/25/24 93/60   01/24/24 102/66   08/11/23 116/67         Current Outpatient Medications   Medication Sig Dispense Refill    clonazePAM (KLONOPIN) 1 MG tablet Take 1 tablet (1 mg) by mouth nightly as needed for anxiety or sleep 30 tablet 0    emtricitabine-tenofovir (TRUVADA) 200-300 MG per tablet Take 1 tablet by mouth daily 90 tablet 0    Multiple Vitamins-Minerals (MULTIVITAMIN ADULT PO)        No current facility-administered medications for this visit.       Social History     Tobacco Use    Smoking status: Never    Smokeless tobacco: Never   Vaping Use    Vaping status: Never Used   Substance Use Topics    Alcohol use: Yes     Alcohol/week: 6.0 standard drinks of alcohol     Types: 6 Standard drinks or equivalent per week     Comment: 3-4 days a week, 1-2 drinks at a time    Drug use: No       Health Maintenance Due   Topic Date Due    Pneumococcal Vaccine: Pediatrics (0 to 5 Years) and At-Risk Patients (6 to 64 Years) (1 of 2 - PCV) Never done    COVID-19 Vaccine (4 - 2023-24 season) 01/12/2024    GLUCOSE  04/06/2024    HEPATITIS A IMMUNIZATION (2 of 2 - Risk 2-dose series) 02/11/2024       No results found for: \"PAP\"      June 25, 2024 3:45 " PM

## 2024-06-25 NOTE — PROGRESS NOTES
Assessment & Plan     Dysfunction of left eustachian tube  Symptoms most consistent with eustachian tube dysfunction given significant retraction seen on exam today. Recommend Afrin + Flonase + frequent pressure equalization. Given recurrence and chronicity of issue will also refer to ENT. Less likely to be TMJ-referred pain given normal exam today.  - Adult ENT  Referral; Future      Subjective   Alcides is a 38 year old, presenting for the following health issues:  Ear Problem (Left ear pain with movement of jaw and change in pressure. Has been going on for a couple weeks. For last year has hear small popping sounds. )    HPI     L ear discomfort    Last few weeks has been painful with pressure in the jaw  Pressure in the ear has been painful to pop  Has had similar prssure and popping sensation over the past year  More persistent about 1 year ago, came for appointment and went away    Seen last year for similar symptoms in R ear, did recommended course of treatment without significant improvement in symptoms.    Started Flonase nasal spray again  No change in hearing  Is a jaw clencher, feels like it's in the ear    No significant allergy symptoms  No issues with flying, doesn't bother symptoms      Objective    BP 93/60 (BP Location: Left arm, Patient Position: Sitting, Cuff Size: Adult Regular)   Pulse 75   Temp 98.4  F (36.9  C) (Skin)   SpO2 95%   There is no height or weight on file to calculate BMI.  Physical Exam   General: Well-appearing in NAD   HEENT: Normocephalic, atraumatic. Mucus membranes moist. R TM and auditory canal are normal. L TM with significant retraction present, normal auditory canal. No pain over palpation of TMJ, no clicking  Resp: No respiratory distress   MSK: No peripheral edema.   Skin: No rashes or lesions noted.   Psych: Appropriate affect. Mood is good     Signed Electronically by: Birdie Stahl MD

## 2024-07-26 ENCOUNTER — MYC REFILL (OUTPATIENT)
Dept: FAMILY MEDICINE | Facility: CLINIC | Age: 39
End: 2024-07-26

## 2024-07-26 DIAGNOSIS — Z20.6 CONTACT WITH AND (SUSPECTED) EXPOSURE TO HUMAN IMMUNODEFICIENCY VIRUS (HIV): ICD-10-CM

## 2024-07-26 DIAGNOSIS — Z11.3 ROUTINE SCREENING FOR STI (SEXUALLY TRANSMITTED INFECTION): Primary | ICD-10-CM

## 2024-07-26 NOTE — TELEPHONE ENCOUNTER
Medication requested: emtricitabine-tenofovir (TRUVADA) 200-300 MG per tablet   Last office visit: 6/25/2024  [facility] appointments: none  Medication last refilled: 5/6/2024; #90 + 0 refills  Last qualifying labs: to be collected    Pt requested lab orders and they have been entered.    DEREK Echeverria, RN  07/26/24, 2:25 PM

## 2024-07-30 ENCOUNTER — LAB (OUTPATIENT)
Dept: LAB | Facility: CLINIC | Age: 39
End: 2024-07-30
Payer: COMMERCIAL

## 2024-07-30 DIAGNOSIS — Z11.3 ROUTINE SCREENING FOR STI (SEXUALLY TRANSMITTED INFECTION): ICD-10-CM

## 2024-07-31 RX ORDER — EMTRICITABINE AND TENOFOVIR DISOPROXIL FUMARATE 200; 300 MG/1; MG/1
1 TABLET, FILM COATED ORAL DAILY
Qty: 90 TABLET | Refills: 0 | Status: SHIPPED | OUTPATIENT
Start: 2024-07-31

## 2024-08-02 LAB — HIV1 RNA BLD QL NAA+PROBE: NOT DETECTED

## 2024-09-15 ENCOUNTER — HEALTH MAINTENANCE LETTER (OUTPATIENT)
Age: 39
End: 2024-09-15

## 2024-10-03 NOTE — PATIENT INSTRUCTIONS
Genetics  [facility]     Call if any general or medical questions arise - contact our nurse coordinator at [phone]    If you had genetic testing, you can contact the genetic counselor who saw you if you have further questions.    Trini Brown [phone]    Scheduling: [phone]    We will plan imaging by Xray of your legs in a standing position.  Will contact you when orders for this are placed.    Update of genetic tests to be undertaken   No

## 2024-10-10 NOTE — TELEPHONE ENCOUNTER
"FUTURE VISIT INFORMATION      FUTURE VISIT INFORMATION:  Date: 1/8/25  Time: 8 AM  Location: [facility] - ENT  REFERRAL INFORMATION:  Referring provider:  Birdie Stahl MD   Referring providers clinic:  [facility]   Reason for visit/diagnosis:  H69.92 (ICD-10-CM) - Dysfunction of left eustachian tube, Referral from Birdie Stahl, Referral notes in EPIC. Pt made appt for [facility] Location. Pt has [employer] insurance. [facility] Location.     RECORDS REQUESTED FROM      Clinic name Comments Records Status Imaging Status   [facility]  6/25/24 referral/ [facility] - Birdie Stahl MD  Epic            \"Please notify/message CSS if patient completed outside imaging prior to scheduled appointment and/or any outside records that might have been missed at pre visit -Thanks\"  "

## 2024-11-30 ENCOUNTER — MYC REFILL (OUTPATIENT)
Dept: FAMILY MEDICINE | Facility: CLINIC | Age: 39
End: 2024-11-30

## 2024-11-30 DIAGNOSIS — Z20.6 CONTACT WITH AND (SUSPECTED) EXPOSURE TO HUMAN IMMUNODEFICIENCY VIRUS (HIV): ICD-10-CM

## 2024-12-02 RX ORDER — EMTRICITABINE AND TENOFOVIR DISOPROXIL FUMARATE 200; 300 MG/1; MG/1
1 TABLET, FILM COATED ORAL DAILY
Qty: 90 TABLET | Refills: 0 | Status: SHIPPED | OUTPATIENT
Start: 2024-12-02

## 2024-12-02 NOTE — TELEPHONE ENCOUNTER
Medication requested: emtricitabine-tenofovir (TRUVADA) 200-300 MG per tablet   Last office visit: 11/22/24  [facility] appointments: none  Medication last refilled: 7/31/24; 90 + 0 refills  Last qualifying labs:   Component      Latest Ref Rng 11/22/2024  1:17 PM   HIV Antigen Antibody Combo      Nonreactive  Nonreactive      Component      Latest Ref Rng 11/22/2024  1:17 PM   GFR Estimate      >60 mL/min/1.73m2 >90      Prescription approved per [facility] Refill Protocol.    Manuel REED, RN  12/02/24 12:31 PM

## 2024-12-13 ENCOUNTER — ANCILLARY PROCEDURE (OUTPATIENT)
Dept: CT IMAGING | Facility: CLINIC | Age: 39
End: 2024-12-13
Payer: COMMERCIAL

## 2024-12-13 DIAGNOSIS — M25.571 PAIN, JOINT, ANKLE AND FOOT, RIGHT: ICD-10-CM

## 2024-12-13 PROCEDURE — 73700 CT LOWER EXTREMITY W/O DYE: CPT | Mod: RT | Performed by: RADIOLOGY

## 2024-12-13 PROCEDURE — 73700 CT LOWER EXTREMITY W/O DYE: CPT | Mod: XS | Performed by: RADIOLOGY

## 2024-12-19 ENCOUNTER — LAB (OUTPATIENT)
Dept: LAB | Facility: CLINIC | Age: 39
End: 2024-12-19
Payer: COMMERCIAL

## 2024-12-19 DIAGNOSIS — R68.82 LOW LIBIDO: ICD-10-CM

## 2024-12-19 LAB — SHBG SERPL-SCNC: 75 NMOL/L (ref 11–80)

## 2024-12-21 LAB
TESTOST FREE SERPL-MCNC: 11.68 NG/DL
TESTOST SERPL-MCNC: 897 NG/DL (ref 240–950)

## 2024-12-31 DIAGNOSIS — Z01.10 ENCOUNTER FOR HEARING TEST: Primary | ICD-10-CM

## 2025-01-07 NOTE — TELEPHONE ENCOUNTER
Action January 6, 2025 8:29 PM MT   Action Taken Sent a request for imaging from .       DIAGNOSIS: RIGHT ANKLE BONY GROWTH   APPOINTMENT DATE: 01/16/2025   NOTES STATUS DETAILS   OFFICE NOTE from referring provider Care Everywhere 12/16/2024 - Jordan Correa MD - [facility]   OFFICE NOTE from other specialist Internal 07/28/2022 - Santana Lopez MD - [facility] Orthopaedic Surgery     OPERATIVE REPORT    10/02/2015 - Dr. Correa  Right great toe amputation. Right soft tissue debulking procedure.   (Found in Media Tab as OP Report 10/02/2015)        MRI PACS Internal   CT SCAN PACS Internal   XRAYS (IMAGES & REPORTS) In process Internal    HP:  12/04/2024 - RT Ankle  06/26/2023 - RT Foot

## 2025-01-08 ENCOUNTER — OFFICE VISIT (OUTPATIENT)
Dept: AUDIOLOGY | Facility: CLINIC | Age: 40
End: 2025-01-08
Payer: COMMERCIAL

## 2025-01-08 ENCOUNTER — PRE VISIT (OUTPATIENT)
Dept: OTOLARYNGOLOGY | Facility: CLINIC | Age: 40
End: 2025-01-08

## 2025-01-08 ENCOUNTER — OFFICE VISIT (OUTPATIENT)
Dept: OTOLARYNGOLOGY | Facility: CLINIC | Age: 40
End: 2025-01-08
Payer: COMMERCIAL

## 2025-01-08 DIAGNOSIS — Z01.10 EXAMINATION OF EARS AND HEARING: ICD-10-CM

## 2025-01-08 DIAGNOSIS — H93.8X2 EAR FULLNESS, LEFT: ICD-10-CM

## 2025-01-08 DIAGNOSIS — M26.609 TMJ (TEMPOROMANDIBULAR JOINT SYNDROME): Primary | ICD-10-CM

## 2025-01-08 DIAGNOSIS — H92.02 LEFT EAR PAIN: Primary | ICD-10-CM

## 2025-01-08 DIAGNOSIS — H69.92 DYSFUNCTION OF LEFT EUSTACHIAN TUBE: ICD-10-CM

## 2025-01-08 ASSESSMENT — PAIN SCALES - GENERAL: PAINLEVEL_OUTOF10: NO PAIN (0)

## 2025-01-08 NOTE — PROGRESS NOTES
AUDIOLOGY REPORT    SUMMARY: Audiology visit completed. See audiogram for results.      RECOMMENDATIONS: Follow-up with ENT.      Ananda Baldwin  Audiologist  [place] License  #[id number]

## 2025-01-08 NOTE — LETTER
1/8/2025       RE: Maunel Goddard  [address]  [place] [address]     Dear Colleague,    Thank you for referring your patient, Manuel Goddard, to the [facility] [place] at [facility]. Please see a copy of my visit note below.      [facility] [place]  [address]  [address]  [place] [address]  Phone: [phone]  Fax: [phone]    Patient:  Manuel Goddard, Date of birth 1985  Date of Visit:  Jan 8, 2025  Referring Provider Birdie Stahl      Assessment & Plan     TMJ (temporomandibular joint syndrome)  Residual symptoms highly suggestive of TMJ. Provided conservative measures at this time. Patient may reach out for TMJ referral.    Dysfunction of left eustachian tube  Likely combination of TMJ and ETD which caused symptoms this summer. Resolved.      Review of external notes as documented elsewhere in note  15 minutes spent by me on the date of the encounter doing chart review, history and exam, documentation and further activities per the note      History of Present Illness    Pertinent history obtain from: chart review and patient    Over the summer crackling, bubbling in left ear  Largely resolved  Sharp pain with certain jaw movements in the left ear  No drainage, pain at rest/day to day  No hearing changes      PHYSICAL EXAM:  There were no vitals taken for this visit.  Constitutional:  The patient was unaccompanied, well-groomed, and in no acute distress.     Skin: Normal:  warm and pink without rash    Neurologic: Alert and oriented x 3. Cooperative.    Psychiatric: The patient's affect was calm, cooperative, and appropriate.     Communication:  Normal; communicates verbally, voice quality WNL   Respiratory: Breathing comfortably without stridor or exertion of accessory muscles   Head/Face:  Normocephalic and atraumatic.  No lesions or  scars.    Eyes: Extraocular movement intact. Clear sclera.   Ears: Pinnae and tragus non-tender. No external deformity, hearing normal to conversational voice   Nose: No anterior drainage, no external deformity   Oral Cavity: Normal tongue, adequate dentition, moist.   Neck: Supple with normal laryngeal and tracheal landmarks. Normal range of motion.       Otologic microscope exam:   Patient's ear pathology required use of the binocular microscope for the purpose of cleaning and improving visualization in order to assure a more accurate diagnostic evaluation.     Right ear was examined under the microscope. Debris cleaned with suction and alligators. TM intact, clear, nicely aerated middle ear space.    Left ear was also examined under the microscope.  Normal appearing TM, nicely aerated middle ear space.            Audiogram: 1/8/2025 - data independently reviewed  Right ear: Normal hearing   Left ear: Normal hearing   SRT right: 10 dB left: 10 dB   WR right: 100% left: 100%   Acoustic Reflexes: present in all conditions  Tympanograms: type Ad right, type A left       Janay Quinn PA-C   Otolaryngology-Head & Neck Surgery               Again, thank you for allowing me to participate in the care of your patient.      Sincerely,    Janay Quinn PA-C

## 2025-01-08 NOTE — PATIENT INSTRUCTIONS
You were seen in the ENT Clinic today by Janay Quinn. If you have any questions or concerns after your appointment, please contact us (see below)    - Audiogram and ear exam is normal. Hearing is excellent and ears are healthy.  - Suspect symptoms are due to inflammation of the temporomandibular joint due to clenching.  - Follow up as needed for any changes in hearing.  - Please send a Raging[employer] message or call the ENT clinic at [phone] with any questions or concerns.     For TMJ relief self-care treatments can be effective and include:  48-72 hours of IBUPROFEN/ADVIL 400-600 mg ( 2-3 200 mg pills) every 8 hours x 3 days. Take with food to avoid stomach upset.   Gentle massage of the muscle above your ear is recommended also.   Warm packs.   SOFT DIET is recommended.  Keeping your teeth apart when you are not swallowing or eating : potentially placing tongue to front of mouth, or between teeth to prevent grinding and rest the muscles of your jaw.  Practicing good posture  -If symptoms do not improve with management strategies discussed above, recommend following up with dentist for discussion of further management. Contact clinic if you would like a TMJ Clinic Referral.      How to Contact Us:  Send a RagingWire message to your provider. Our team will respond to you via RagingWire. Occasionally, we will need to call you to get further information.  For urgent matters (Monday-Friday), call the ENT Clinic: [phone] and speak with a call center team member - they will route your call appropriately.   If you'd like to speak directly with a nurse, please find our contact information below. We do our best to check voicemail frequently throughout the day, and will work to call you back within 1-2 days. For urgent matters, please use the general clinic phone numbers listed above.      Gianna BLEDSOE LPN  Direct: [phone]

## 2025-01-08 NOTE — PROGRESS NOTES
[facility]IC [address] [address]  Phone: [phone]  Fax: [phone]    Patient:  Manuel Goddard, Date of birth 1985  Date of Visit:  Jan 8, 2025  Referring Provider Birdie Stahl      Assessment & Plan      TMJ (temporomandibular joint syndrome)  Residual symptoms highly suggestive of TMJ. Provided conservative measures at this time. Patient may reach out for TMJ referral.    Dysfunction of left eustachian tube  Likely combination of TMJ and ETD which caused symptoms this summer. Resolved.      Review of external notes as documented elsewhere in note  15 minutes spent by me on the date of the encounter doing chart review, history and exam, documentation and further activities per the note      History of Present Illness     Pertinent history obtain from: chart review and patient    Over the summer crackling, bubbling in left ear  Largely resolved  Sharp pain with certain jaw movements in the left ear  No drainage, pain at rest/day to day  No hearing changes      PHYSICAL EXAM:  There were no vitals taken for this visit.  Constitutional:  The patient was unaccompanied, well-groomed, and in no acute distress.     Skin: Normal:  warm and pink without rash    Neurologic: Alert and oriented x 3. Cooperative.    Psychiatric: The patient's affect was calm, cooperative, and appropriate.     Communication:  Normal; communicates verbally, voice quality WNL   Respiratory: Breathing comfortably without stridor or exertion of accessory muscles   Head/Face:  Normocephalic and atraumatic.  No lesions or scars.    Eyes: Extraocular movement intact. Clear sclera.   Ears: Pinnae and tragus non-tender. No external deformity, hearing normal to conversational voice   Nose: No anterior drainage, no external deformity   Oral Cavity: Normal tongue, adequate dentition, moist.   Neck: Supple with normal laryngeal and tracheal landmarks.  Normal range of motion.       Otologic microscope exam:   Patient's ear pathology required use of the binocular microscope for the purpose of cleaning and improving visualization in order to assure a more accurate diagnostic evaluation.     Right ear was examined under the microscope. Debris cleaned with suction and alligators. TM intact, clear, nicely aerated middle ear space.    Left ear was also examined under the microscope.  Normal appearing TM, nicely aerated middle ear space.            Audiogram: 1/8/2025 - data independently reviewed  Right ear: Normal hearing   Left ear: Normal hearing   SRT right: 10 dB left: 10 dB   WR right: 100% left: 100%   Acoustic Reflexes: present in all conditions  Tympanograms: type Ad right, type A left       Janay Quinn PA-C   Otolaryngology-Head & Neck Surgery

## 2025-01-11 ENCOUNTER — HEALTH MAINTENANCE LETTER (OUTPATIENT)
Age: 40
End: 2025-01-11

## 2025-01-16 ENCOUNTER — OFFICE VISIT (OUTPATIENT)
Dept: ORTHOPEDICS | Facility: CLINIC | Age: 40
End: 2025-01-16
Payer: COMMERCIAL

## 2025-01-16 ENCOUNTER — PRE VISIT (OUTPATIENT)
Dept: ORTHOPEDICS | Facility: CLINIC | Age: 40
End: 2025-01-16

## 2025-01-16 VITALS — WEIGHT: 158 LBS | BODY MASS INDEX: 21.24 KG/M2

## 2025-01-16 DIAGNOSIS — D18.09 HEMANGIOMA OF OTHER SITES: Primary | ICD-10-CM

## 2025-01-16 NOTE — PROGRESS NOTES
Impression: Significant bony overgrowth and skeletal deformity secondary to limb hypertrophy of the right ower extremity.    Plan: 1.  I do feel it is reasonable to debulk the bone on the medial aspect of the right ankle as well as the plantar surface of the region of the first metatarsal.  2.  Patient is previously seen Dr. Correa at [facility] who is planning surgery.  I would suggest he see Dr. Correa here to maintain continuity of care.  3.  If the patient does have surgery in the future there should be a discussion about preoperative radiation though I am not sure that that is indicated or would be helpful in this setting.    Patient is a 39-year-old male with hemihypertrophy of the left lower extremity.  He is seen today for second opinion.  Essentially his insurance has changed and he therefore was required to come to the [facility].  He was previously treated by Dr. Cobian at [facility].    Today he reports he has discomfort on the plantar surface of the left foot in the region of the first metatarsal.  He also has complaints of difficulty with shoe fitting secondary to an apparent bony mass on the medial aspect of the right ankle.    On physical exam he has obvious deformity of the foot.  He has obvious history of multiple surgeries at that site.  He has a very prominent apparent mass along the medial aspect of the right ankle and he has a soft tissue mass which is very tender on the plantar surface of the ipsilateral foot.    Reviewed his MRI scan which shows excessive bony ossicles along the medial aspect of the right ankle.  In addition he has a significant bony overgrowth in the region of the distal first metatarsal.    I reviewed with the patient my findings.  I do think he would benefit from a debulking of the bone along the medial aspect of the right ankle as well as along the plantar surface of the region of the first metatarsal.    He has enjoyed working with Dr. Cobian previously.  I reported to him that Dr. Correa  does see patients at this site.  We will therefore involve him in his care and refer him to Dr. Cobian.    The total length of this visit was greater than 30 minutes.

## 2025-01-16 NOTE — PATIENT INSTRUCTIONS
Impression: Significant bony overgrowth and skeletal deformity secondary to limb hypertrophy of the right ower extremity.    Plan: 1.  I do feel it is reasonable to debulk the bone on the medial aspect of the right ankle as well as the plantar surface of the region of the first metatarsal.  2.  Patient is previously seen Dr. Correa at [facility] who is planning surgery.  I would suggest he see Dr. Correa here to maintain continuity of care.  3.  If the patient does have surgery in the future there should be a discussion about preoperative radiation though I am not sure that that is indicated or would be helpful in this setting.

## 2025-01-16 NOTE — LETTER
1/16/2025      Manuel Goddard  [address]  [place] [address]      Dear Colleague,    Thank you for referring your patient, Manuel Goddard, to the [facility] [place]. Please see a copy of my visit note below.    Impression: Significant bony overgrowth and skeletal deformity secondary to limb hypertrophy of the right ower extremity.    Plan: 1.  I do feel it is reasonable to debulk the bone on the medial aspect of the right ankle as well as the plantar surface of the region of the first metatarsal.  2.  Patient is previously seen Dr. Correa at [facility] who is planning surgery.  I would suggest he see Dr. Correa here to maintain continuity of care.  3.  If the patient does have surgery in the future there should be a discussion about preoperative radiation though I am not sure that that is indicated or would be helpful in this setting.    Patient is a 39-year-old male with hemihypertrophy of the left lower extremity.  He is seen today for second opinion.  Essentially his insurance has changed and he therefore was required to come to the [place].  He was previously treated by Dr. Cobian at [facility].    Today he reports he has discomfort on the plantar surface of the left foot in the region of the first metatarsal.  He also has complaints of difficulty with shoe fitting secondary to an apparent bony mass on the medial aspect of the right ankle.    On physical exam he has obvious deformity of the foot.  He has obvious history of multiple surgeries at that site.  He has a very prominent apparent mass along the medial aspect of the right ankle and he has a soft tissue mass which is very tender on the plantar surface of the ipsilateral foot.    Reviewed his MRI scan which shows excessive bony ossicles along the medial aspect of the right ankle.  In addition he has a significant bony overgrowth in the region of the distal first metatarsal.    I reviewed with the patient my findings.  I  do think he would benefit from a debulking of the bone along the medial aspect of the right ankle as well as along the plantar surface of the region of the first metatarsal.    He has enjoyed working with Dr. Cobian previously.  I reported to him that Dr. Correa does see patients at this site.  We will therefore involve him in his care and refer him to Dr. Cobian.    The total length of this visit was greater than 30 minutes.        Again, thank you for allowing me to participate in the care of your patient.        Sincerely,        Luis Salazar MD    Electronically signed

## 2025-01-16 NOTE — NURSING NOTE
Reason For Visit:   Chief Complaint   Patient presents with    Consult     Right ankle bony growth - referral from Lewis Andrade MD       Wt 71.7 kg (158 lb)   BMI 21.24 kg/m      Pain Assessment  Patient Currently in Pain: Yes  Patient's Stated Pain Goal: 5  0-10 Pain Scale: 5  Primary Pain Location: Ankle  Pain Descriptors:  (Arthritic pain)    Bradley Overton, CMA

## 2025-01-27 NOTE — TELEPHONE ENCOUNTER
DIAGNOSIS: Right ankle bony growth/pain / CTs at [facility]/ [employer]/ ortho con saw Correa at [facility] but insurance changed    APPOINTMENT DATE: 2/4/25   NOTES STATUS DETAILS    OFFICE NOTE from referring provider [facility] 1/16/25 -Luis Salazar MD     OFFICE NOTE from other specialist Internal/ Care Everywhere 07/28/2022 - Santana Lopez MD - [facility] Orthopaedic Surgery    12/16/2024 - Jordan Correa MD - [facility] Ortho    OPERATIVE REPORT      10/02/2015 - Dr. Correa  Right great toe amputation. Right soft tissue debulking procedure.   (Found in Media Tab as OP Report 10/02/2015)    MRI PACS Internal    CT SCAN PACS Internal    XRAYS (IMAGES & REPORTS) PACS Internal     HP:  11/25/2024 - RT Ankle  06/26/2023 - RT Foot

## 2025-02-04 ENCOUNTER — PRE VISIT (OUTPATIENT)
Dept: ORTHOPEDICS | Facility: CLINIC | Age: 40
End: 2025-02-04

## 2025-02-04 ENCOUNTER — OFFICE VISIT (OUTPATIENT)
Dept: ORTHOPEDICS | Facility: CLINIC | Age: 40
End: 2025-02-04
Payer: COMMERCIAL

## 2025-02-04 VITALS — WEIGHT: 164.3 LBS | BODY MASS INDEX: 22.26 KG/M2 | HEIGHT: 72 IN

## 2025-02-04 DIAGNOSIS — M25.571 PAIN IN JOINT, ANKLE AND FOOT, RIGHT: Primary | ICD-10-CM

## 2025-02-04 PROCEDURE — 99214 OFFICE O/P EST MOD 30 MIN: CPT | Performed by: ORTHOPAEDIC SURGERY

## 2025-02-04 NOTE — PROGRESS NOTES
Chief complaint: Right ankle and foot pain    Patient is a 39-year male who presents today for evaluation of his right foot.  Patient has been seen in the past at [facility] which unfortunately cannot be done anymore secondary to his changing insurance    Patient reports to have developed some growth of bone across the ankle and the great toe and reports to have quite a bit of pain and discomfort.  The growth across the great toe it is along the plantar aspect which pushes the toe against the shoe and create some pain and discomfort.  He was reports to have difficulties with the ankle because of the same reason and the extra growth medially.  He also reports to have quite a bit of tension across the skin for the right ankle.    A CT scan has been obtained in the past which is significant for showing affirmative exostosis along the plantar aspect of great toe and the anterior medial aspect of the ankle joint.  Presents with quite arthritic ankle as well.    Patient presents today for discussion of treatment options and to understand if a surgical intervention would be of benefit to improve the function and alignment of his great toe and ankle.    We reviewed today his past medical and surgical history current medications and drug allergies.    On today's visit he presents a pleasant male in no apparent distress with a weight of 164 pounds and a BMI of 22.2    On today's visit he presents with quite a limited ankle motion for the right ankle.  There is no signs of infection inflammation there is no open ulcers.  Presents with quite a bit of thickening along the plantar aspect of the great toe as well.  The soft tissue envelope is quite acceptable.    CT scan was reviewed today which is significant for showing the findings mentioned above.    Assessment: Right great toe exostosis.  Right ankle exostosis    Plan: Discussed with the patient that at this point we can proceed with planned surgery in the form of  a debulking of the right ankle and great toe.  This would include partial excision of the tibia and talus for the ankle and the first metatarsal for the toe.    Discussed with the patient the most likely postoperative course and complications from undergoing such intervention which include but not limited to infection bleeding nerve damage residual pain and stiffness.    Patient will proceed with the surgery at the best of his convenience.  In the meantime he has no restrictions.  All questions were answered.    TT 30 minutes

## 2025-02-04 NOTE — NURSING NOTE
Reason For Visit:   Chief Complaint   Patient presents with    Consult     Right bony growth and pain of the right ankle  CT 12/13/24       Ht 1.829 m (6')   Wt 74.5 kg (164 lb 4.8 oz)   BMI 22.28 kg/m      Pain Assessment  Patient Currently in Pain: Yes  0-10 Pain Scale: 7  Primary Pain Location: Ankle (right foot and ankle)    Cindy Moore, ATC

## 2025-02-04 NOTE — LETTER
2/4/2025      Manuel Goddard  [address]  [place] [address]      Dear Colleague,    Thank you for referring your patient, Manuel Goddard, to the [facility] [place]. Please see a copy of my visit note below.    Chief complaint: Right ankle and foot pain    Patient is a 39-year male who presents today for evaluation of his right foot.  Patient has been seen in the past at [facility] orthopedic [place] which unfortunately cannot be done anymore secondary to his changing insurance    Patient reports to have developed some growth of bone across the ankle and the great toe and reports to have quite a bit of pain and discomfort.  The growth across the great toe it is along the plantar aspect which pushes the toe against the shoe and create some pain and discomfort.  He was reports to have difficulties with the ankle because of the same reason and the extra growth medially.  He also reports to have quite a bit of tension across the skin for the right ankle.    A CT scan has been obtained in the past which is significant for showing affirmative exostosis along the plantar aspect of great toe and the anterior medial aspect of the ankle joint.  Presents with quite arthritic ankle as well.    Patient presents today for discussion of treatment options and to understand if a surgical intervention would be of benefit to improve the function and alignment of his great toe and ankle.    We reviewed today his past medical and surgical history current medications and drug allergies.    On today's visit he presents a pleasant male in no apparent distress with a weight of 164 pounds and a BMI of 22.2    On today's visit he presents with quite a limited ankle motion for the right ankle.  There is no signs of infection inflammation there is no open ulcers.  Presents with quite a bit of thickening along the plantar aspect of the great toe as well.  The soft tissue envelope is quite  acceptable.    CT scan was reviewed today which is significant for showing the findings mentioned above.    Assessment: Right great toe exostosis.  Right ankle exostosis    Plan: Discussed with the patient that at this point we can proceed with planned surgery in the form of a debulking of the right ankle and great toe.  This would include partial excision of the tibia and talus for the ankle and the first metatarsal for the toe.    Discussed with the patient the most likely postoperative course and complications from undergoing such intervention which include but not limited to infection bleeding nerve damage residual pain and stiffness.    Patient will proceed with the surgery at the best of his convenience.  In the meantime he has no restrictions.  All questions were answered.    TT 30 minutes      Again, thank you for allowing me to participate in the care of your patient.        Sincerely,        Jordan Correa MD    Electronically signed

## 2025-02-06 ENCOUNTER — TELEPHONE (OUTPATIENT)
Dept: ORTHOPEDICS | Facility: CLINIC | Age: 40
End: 2025-02-06
Payer: COMMERCIAL

## 2025-02-06 PROBLEM — M25.571 PAIN IN JOINT, ANKLE AND FOOT, RIGHT: Status: ACTIVE | Noted: 2025-02-04

## 2025-02-06 NOTE — TELEPHONE ENCOUNTER
Patient is scheduled for surgery with Dr. Correa.     Spoke with: Patient     Date of Surgery: 3/12/25    Location: [facility] OR     Pre op with Provider: KERRIE     H&P: Patient will schedule with PCP. Informed patient pre op will need to be completed within 30 days of surgery date.     Additional imaging/appointments: Patient is scheduled for 2 week post op on 3/31/25 at 11:00.   Patient is scheduled for 6 week post op on 4/22/25 at 11:20.    Surgery packet: Patient received surgery packet in clinic      Additional comments: Will let patient know should an earlier surgery date become available.         Tari Woods on 2/6/2025 at 10:31 AM

## 2025-02-28 ENCOUNTER — MYC REFILL (OUTPATIENT)
Dept: FAMILY MEDICINE | Facility: CLINIC | Age: 40
End: 2025-02-28

## 2025-02-28 DIAGNOSIS — Z29.81 ENCOUNTER FOR HIV PRE-EXPOSURE PROPHYLAXIS: Primary | ICD-10-CM

## 2025-02-28 DIAGNOSIS — Z11.3 ROUTINE SCREENING FOR STI (SEXUALLY TRANSMITTED INFECTION): ICD-10-CM

## 2025-02-28 DIAGNOSIS — Z20.6 CONTACT WITH AND (SUSPECTED) EXPOSURE TO HUMAN IMMUNODEFICIENCY VIRUS (HIV): ICD-10-CM

## 2025-02-28 NOTE — TELEPHONE ENCOUNTER
Medication requested: emtricitabine-tenofovir (TRUVADA) 200-300 MG per tablet   Last office visit: 11/22/24  [facility] appointments: 3/6/25  Medication last refilled: 12/2/24; 90 + 0 refills  Last qualifying labs: to be collected    Labs placed and pt notified to schedule lab-only appointment.    Manuel REED, RN  02/28/25 3:49 PM

## 2025-03-04 ENCOUNTER — LAB (OUTPATIENT)
Dept: LAB | Facility: CLINIC | Age: 40
End: 2025-03-04
Payer: COMMERCIAL

## 2025-03-04 DIAGNOSIS — Z11.3 ROUTINE SCREENING FOR STI (SEXUALLY TRANSMITTED INFECTION): ICD-10-CM

## 2025-03-04 DIAGNOSIS — Z20.6 CONTACT WITH AND (SUSPECTED) EXPOSURE TO HUMAN IMMUNODEFICIENCY VIRUS (HIV): ICD-10-CM

## 2025-03-04 LAB
HIV 1+2 AB+HIV1 P24 AG SERPL QL IA: NONREACTIVE
T PALLIDUM AB SER QL: NONREACTIVE

## 2025-03-05 LAB
C TRACH DNA SPEC QL PROBE+SIG AMP: NEGATIVE
HIV1 RNA # PLAS NAA DL=20: NOT DETECTED COPIES/ML
N GONORRHOEA DNA SPEC QL NAA+PROBE: NEGATIVE
SPECIMEN TYPE: NORMAL

## 2025-03-05 RX ORDER — EMTRICITABINE AND TENOFOVIR DISOPROXIL FUMARATE 200; 300 MG/1; MG/1
1 TABLET, FILM COATED ORAL DAILY
Qty: 90 TABLET | Refills: 0 | Status: SHIPPED | OUTPATIENT
Start: 2025-03-05

## 2025-03-05 NOTE — TELEPHONE ENCOUNTER
Component      Latest Ref Rng 3/4/2025  9:30 AM   HIV Antigen Antibody Combo      Nonreactive  Nonreactive      Component      Latest Ref Rng 11/22/2024  1:17 PM   Creatinine      0.67 - 1.17 mg/dL 1.01      Prescription approved per [facility] Refill Protocol.    Manuel REED, RN  03/05/25 10:17 AM

## 2025-03-06 ENCOUNTER — OFFICE VISIT (OUTPATIENT)
Dept: FAMILY MEDICINE | Facility: CLINIC | Age: 40
End: 2025-03-06
Payer: COMMERCIAL

## 2025-03-06 VITALS
HEART RATE: 54 BPM | RESPIRATION RATE: 18 BRPM | OXYGEN SATURATION: 95 % | TEMPERATURE: 98.3 F | SYSTOLIC BLOOD PRESSURE: 110 MMHG | BODY MASS INDEX: 21.16 KG/M2 | DIASTOLIC BLOOD PRESSURE: 72 MMHG | WEIGHT: 156 LBS

## 2025-03-06 DIAGNOSIS — M25.571 PAIN IN JOINT INVOLVING ANKLE AND FOOT, RIGHT: ICD-10-CM

## 2025-03-06 DIAGNOSIS — Z01.818 PREOP GENERAL PHYSICAL EXAM: Primary | ICD-10-CM

## 2025-03-06 NOTE — NURSING NOTE
Alcides  39 year old    Chief Complaint   Patient presents with    Pre-Op Exam     RIGHT ankle surgery on 3/12/25            Blood pressure 110/72, pulse 54, temperature 98.3  F (36.8  C), temperature source Skin, resp. rate 18, weight 70.8 kg (156 lb), SpO2 95%. Body mass index is 21.16 kg/m .    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 90 tablet 0    Multiple Vitamins-Minerals (MULTIVITAMIN ADULT PO)       clonazePAM (KLONOPIN) 1 MG tablet Take 1 tablet (1 mg) by mouth nightly as needed for anxiety or sleep (Patient not taking: Reported on 3/6/2025) 30 tablet 0     No current facility-administered medications for this visit.              Social History     Tobacco Use    Smoking status: Never    Smokeless tobacco: Never   Vaping Use    Vaping status: Never Used   Substance Use Topics    Alcohol use: Yes     Alcohol/week: 6.0 standard drinks of alcohol     Types: 6 Standard drinks or equivalent per week     Comment: 3-4 days a week, 1-2 drinks at a time    Drug use: No              Health Maintenance Due   Topic Date Due    Pneumococcal Vaccine: Pediatrics (0 to 5 Years) and At-Risk Patients (6 to 49 Years) (1 of 2 - PCV) Never done    ZOSTER IMMUNIZATION (1 of 2) Never done    HEPATITIS A IMMUNIZATION (2 of 2 - Risk 2-dose series) 02/11/2024    YEARLY PREVENTIVE VISIT  08/11/2024    LIPID  09/13/2024         "    No results found for: \"PAP\"           March 6, 2025 2:57 PM  "

## 2025-03-06 NOTE — PROGRESS NOTES
Preoperative Evaluation  [facility] [address], [address]  [facility] [address]  Phone: [phone]  Fax: [phone]  Primary Provider: Lewis Andrade MD  Pre-op Performing Provider: Lewis Andrade MD  Mar 6, 2025           3/6/2025   Surgical Information   What procedure is being done? RIGHT partial talus, tibia, and first metatarsal excision    Facility or Hospital where procedure/surgery will be performed: [facility]   Who is doing the procedure / surgery? Dr. Jordan Correa   Date of surgery / procedure: 03/12/25   Time of surgery / procedure: 10am   Where do you plan to recover after surgery? at home with family     Fax number for surgical facility: Note does not need to be faxed, will be available electronically in Epic.    Assessment & Plan     The proposed surgical procedure is considered INTERMEDIATE risk.    Problem List Items Addressed This Visit    None  Visit Diagnoses       Preop general physical exam    -  Primary    Pain in joint involving ankle and foot, right                  Risks and Recommendations  The patient has the following additional risks and recommendations for perioperative complications:   - No identified additional risk factors other than previously addressed    Antiplatelet or Anticoagulation Medication Instructions   - We reviewed the medication list and the patient is not on an antiplatelet or anticoagulation medications.    Additional Medication Instructions  Take all scheduled medications on the day of surgery EXCEPT for modifications listed below:   - Herbal medications and vitamins: DO NOT TAKE 14 days prior to surgery.    Recommendation  Approval given to proceed with proposed procedure, without further diagnostic evaluation.    31 minutes spent on the date of the encounter doing chart review, history and exam, documentation and further activities as noted.    Lewis Andrade MD  3:17 PM, March 6,  2025      Fani Mcgrath is a 39 year old, presenting for the following:  Pre-Op Exam      HPI: Ongoing ankle pain. Plan for surgery as noted above.           3/6/2025   Pre-Op Questionnaire   Have you ever had a heart attack or stroke? No   Have you ever had surgery on your heart or blood vessels, such as a stent placement, a coronary artery bypass, or surgery on an artery in your head, neck, heart, or legs? No   Do you have chest pain with activity? No   Do you have a history of heart failure? No   Do you currently have a cold, bronchitis or symptoms of other infection? No   Do you have a cough, shortness of breath, or wheezing? No   Do you or anyone in your family have previous history of blood clots? (!) UNKNOWN. Not that he is aware of.    Do you or does anyone in your family have a serious bleeding problem such as prolonged bleeding following surgeries or cuts? No   Have you ever had problems with anemia or been told to take iron pills? No   Have you had any abnormal blood loss such as black, tarry or bloody stools? No   Have you ever had a blood transfusion? No   Are you willing to have a blood transfusion if it is medically needed before, during, or after your surgery? Yes   Have you or any of your relatives ever had problems with anesthesia? No   Do you have sleep apnea, excessive snoring or daytime drowsiness? No   Do you have any artifical heart valves or other implanted medical devices like a pacemaker, defibrillator, or continuous glucose monitor? No   Do you have artificial joints? No   Are you allergic to latex? No     Health Care Directive  Patient does not have a Health Care Directive: Discussed advance care planning with patient; information given to patient to review.    Preoperative Review of    reviewed - controlled substances reflected in medication list.      Status of Chronic Conditions:  See problem list for active medical problems.  Problems all longstanding and stable, except as  noted/documented.  See ROS for pertinent symptoms related to these conditions.    Patient Active Problem List    Diagnosis Date Noted    Pain in joint, ankle and foot, right 2025     Priority: Medium    Insomnia, unspecified type 2022     Priority: Medium     - takes clonazepam 1mg as needed for sleep  - takes about 1-4 tablets a month  - was put on clonazepam rather than a hypnotic because insomnia is anxiety-related  - CSA signed with Dr. 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 2022     Priority: Medium     - 3/30/22 screening anal cytology showed ASCUS, positive HPV 82v, weakly oncogenic type  - had HRA in 2022 with Jannie Peoples, normal, repeat HRA in 1 year      Contact with and (suspected) exposure to human immunodeficiency virus (hiv) 2022     Priority: Medium     # HIV Pre-exposure Prophylaxis  - PrEP Start Date: 2021, Truvada  - HBV testin22 sAg/cAb nonreactive, sAb reactive  - HCV testin22 Ab nonreactive  - HAV immunization: needs          Hemihypertrophy of lower extremity 2015     Priority: Medium     - Since birth, hypertrophy of right lower leg  - Mostly hypertrophy of fatty tissue  - required mult debulking surgeries and stunting of the growth plate.      Other neutropenia 2023     Priority: Low     06/17/15 WBC 3.0  08/13/15 WBC 4.0, ANC 1.7  19 WBC 3.3  21 WBC 2.94, ANC 1.0  21 WBC 3.9, ANC 2.0  23 WBC 2.7, ANC 1.3    21 B12 430, Folate 4.7 (low)  21 B12 493, Folate 11.9  22 Hep C Ab nonreactive, Hep B sAg/cAb nonreactive  23 HIV nonreactive  10/2021 Started on TDF-FTC for PrEP    08/13/15 Peripheral Smear: The blood counts are all in the normal reference range but both neutrophils and platelets are at the low end of normal         Past Medical History:   Diagnosis Date    Chronic osteoarthritis     Degenerative joint  disease     Hemihypertrophy of lower extremity      Past Surgical History:   Procedure Laterality Date    KNEE SURGERY Right     growth plant stunting on right for leg length discrepancy    leg surgeries      5-6 debulking surgeries of R lower leg and stunding of the growth plate     Current Outpatient Medications   Medication Sig Dispense Refill    clonazePAM (KLONOPIN) 1 MG tablet Take 1 tablet (1 mg) by mouth nightly as needed for anxiety or sleep 30 tablet 0    emtricitabine-tenofovir (TRUVADA) 200-300 MG per tablet Take 1 tablet by mouth daily. 90 tablet 0    Multiple Vitamins-Minerals (MULTIVITAMIN ADULT PO)          No Known Allergies     Social History     Tobacco Use    Smoking status: Never    Smokeless tobacco: Never   Substance Use Topics    Alcohol use: Yes     Alcohol/week: 6.0 standard drinks of alcohol     Types: 6 Standard drinks or equivalent per week     Comment: 3-4 days a week, 1-2 drinks at a time     Family History   Problem Relation Age of Onset    Thyroid Cancer Mother     Liver Disease Mother         autoimmune liver disease    No Known Problems Father     No Known Problems Sister     No Known Problems Brother     Cerebrovascular Disease Maternal Grandfather         late 70's    Diabetes Paternal Aunt         ?DMI    Diabetes Cousin     No Known Problems Half-Brother     Coronary Artery Disease No family hx of     Colon Cancer No family hx of     Prostate Cancer No family hx of      History   Drug Use No             Review of Systems  Constitutional, HEENT, cardiovascular, pulmonary, gi and gu systems are negative, except as otherwise noted.    Objective    There were no vitals taken for this visit.   Estimated body mass index is 22.28 kg/m  as calculated from the following:    Height as of 2/4/25: 1.829 m (6').    Weight as of 2/4/25: 74.5 kg (164 lb 4.8 oz).  Physical Exam  GENERAL: alert and no distress  NECK: no adenopathy, no asymmetry, masses, or scars  RESP: lungs clear to  auscultation - no rales, rhonchi or wheezes  CV: regular rate and rhythm, normal S1 S2, no S3 or S4, no murmur, click or rub, no peripheral edema  ABDOMEN: soft, nontender, no hepatosplenomegaly, no masses and bowel sounds normal  MS: antalgic gait, non-weightbearing on RIGHT ankle    Recent Labs   Lab Test 11/22/24  1317   HGB 15.0         POTASSIUM 4.3   CR 1.01        Diagnostics  No labs were ordered during this visit.   No EKG required, no history of coronary heart disease, significant arrhythmia, peripheral arterial disease or other structural heart disease.    Revised Cardiac Risk Index (RCRI)  The patient has the following serious cardiovascular risks for perioperative complications:   - No serious cardiac risks = 0 points     RCRI Interpretation: 0 points: Class I (very low risk - 0.4% complication rate)         Signed Electronically by: Lewis Andrade MD  A copy of this evaluation report is provided to the requesting physician.

## 2025-03-11 ENCOUNTER — ANESTHESIA EVENT (OUTPATIENT)
Dept: SURGERY | Facility: AMBULATORY SURGERY CENTER | Age: 40
End: 2025-03-11
Payer: COMMERCIAL

## 2025-03-12 ENCOUNTER — HOSPITAL ENCOUNTER (OUTPATIENT)
Facility: AMBULATORY SURGERY CENTER | Age: 40
Discharge: HOME | End: 2025-03-12
Attending: ORTHOPAEDIC SURGERY
Payer: COMMERCIAL

## 2025-03-12 ENCOUNTER — ANESTHESIA (OUTPATIENT)
Dept: SURGERY | Facility: AMBULATORY SURGERY CENTER | Age: 40
End: 2025-03-12
Payer: COMMERCIAL

## 2025-03-12 VITALS
HEART RATE: 48 BPM | TEMPERATURE: 97.6 F | RESPIRATION RATE: 16 BRPM | OXYGEN SATURATION: 100 % | DIASTOLIC BLOOD PRESSURE: 73 MMHG | SYSTOLIC BLOOD PRESSURE: 109 MMHG

## 2025-03-12 DIAGNOSIS — M25.571 PAIN IN JOINT, ANKLE AND FOOT, RIGHT: Primary | ICD-10-CM

## 2025-03-12 RX ORDER — NALOXONE HYDROCHLORIDE 0.4 MG/ML
0.4 INJECTION, SOLUTION INTRAMUSCULAR; INTRAVENOUS; SUBCUTANEOUS
Status: DISCONTINUED | OUTPATIENT
Start: 2025-03-12 | End: 2025-03-12 | Stop reason: HOSPADM

## 2025-03-12 RX ORDER — HYDROXYZINE HYDROCHLORIDE 25 MG/1
25 TABLET, FILM COATED ORAL
Status: DISCONTINUED | OUTPATIENT
Start: 2025-03-12 | End: 2025-03-13 | Stop reason: HOSPADM

## 2025-03-12 RX ORDER — GLYCOPYRROLATE 0.2 MG/ML
INJECTION, SOLUTION INTRAMUSCULAR; INTRAVENOUS PRN
Status: DISCONTINUED | OUTPATIENT
Start: 2025-03-12 | End: 2025-03-12

## 2025-03-12 RX ORDER — SODIUM CHLORIDE, SODIUM LACTATE, POTASSIUM CHLORIDE, CALCIUM CHLORIDE 600; 310; 30; 20 MG/100ML; MG/100ML; MG/100ML; MG/100ML
INJECTION, SOLUTION INTRAVENOUS CONTINUOUS
Status: DISCONTINUED | OUTPATIENT
Start: 2025-03-12 | End: 2025-03-13 | Stop reason: HOSPADM

## 2025-03-12 RX ORDER — ONDANSETRON 2 MG/ML
4 INJECTION INTRAMUSCULAR; INTRAVENOUS EVERY 30 MIN PRN
Status: DISCONTINUED | OUTPATIENT
Start: 2025-03-12 | End: 2025-03-13 | Stop reason: HOSPADM

## 2025-03-12 RX ORDER — DEXAMETHASONE SODIUM PHOSPHATE 10 MG/ML
4 INJECTION, SOLUTION INTRAMUSCULAR; INTRAVENOUS
Status: DISCONTINUED | OUTPATIENT
Start: 2025-03-12 | End: 2025-03-13 | Stop reason: HOSPADM

## 2025-03-12 RX ORDER — OXYCODONE HYDROCHLORIDE 5 MG/1
5 TABLET ORAL
Status: DISCONTINUED | OUTPATIENT
Start: 2025-03-12 | End: 2025-03-13 | Stop reason: HOSPADM

## 2025-03-12 RX ORDER — EPHEDRINE SULFATE 50 MG/ML
INJECTION, SOLUTION INTRAMUSCULAR; INTRAVENOUS; SUBCUTANEOUS PRN
Status: DISCONTINUED | OUTPATIENT
Start: 2025-03-12 | End: 2025-03-12

## 2025-03-12 RX ORDER — FLUMAZENIL 0.1 MG/ML
0.2 INJECTION, SOLUTION INTRAVENOUS
Status: DISCONTINUED | OUTPATIENT
Start: 2025-03-12 | End: 2025-03-12 | Stop reason: HOSPADM

## 2025-03-12 RX ORDER — PROPOFOL 10 MG/ML
INJECTION, EMULSION INTRAVENOUS PRN
Status: DISCONTINUED | OUTPATIENT
Start: 2025-03-12 | End: 2025-03-12

## 2025-03-12 RX ORDER — BUPIVACAINE HYDROCHLORIDE 5 MG/ML
INJECTION, SOLUTION EPIDURAL; INTRACAUDAL; PERINEURAL
Status: DISCONTINUED | OUTPATIENT
Start: 2025-03-12 | End: 2025-03-12

## 2025-03-12 RX ORDER — FENTANYL CITRATE 50 UG/ML
25 INJECTION, SOLUTION INTRAMUSCULAR; INTRAVENOUS
Status: DISCONTINUED | OUTPATIENT
Start: 2025-03-12 | End: 2025-03-13 | Stop reason: HOSPADM

## 2025-03-12 RX ORDER — HYDROCODONE BITARTRATE AND ACETAMINOPHEN 5; 325 MG/1; MG/1
1 TABLET ORAL
Status: DISCONTINUED | OUTPATIENT
Start: 2025-03-12 | End: 2025-03-13 | Stop reason: HOSPADM

## 2025-03-12 RX ORDER — FENTANYL CITRATE 50 UG/ML
25 INJECTION, SOLUTION INTRAMUSCULAR; INTRAVENOUS EVERY 5 MIN PRN
Status: DISCONTINUED | OUTPATIENT
Start: 2025-03-12 | End: 2025-03-13 | Stop reason: HOSPADM

## 2025-03-12 RX ORDER — HYDROCODONE BITARTRATE AND ACETAMINOPHEN 5; 325 MG/1; MG/1
1-2 TABLET ORAL EVERY 4 HOURS PRN
Qty: 20 TABLET | Refills: 0 | Status: SHIPPED | OUTPATIENT
Start: 2025-03-12

## 2025-03-12 RX ORDER — NALOXONE HYDROCHLORIDE 0.4 MG/ML
0.1 INJECTION, SOLUTION INTRAMUSCULAR; INTRAVENOUS; SUBCUTANEOUS
Status: DISCONTINUED | OUTPATIENT
Start: 2025-03-12 | End: 2025-03-13 | Stop reason: HOSPADM

## 2025-03-12 RX ORDER — DEXAMETHASONE SODIUM PHOSPHATE 4 MG/ML
INJECTION, SOLUTION INTRA-ARTICULAR; INTRALESIONAL; INTRAMUSCULAR; INTRAVENOUS; SOFT TISSUE PRN
Status: DISCONTINUED | OUTPATIENT
Start: 2025-03-12 | End: 2025-03-12

## 2025-03-12 RX ORDER — OXYCODONE HYDROCHLORIDE 5 MG/1
10 TABLET ORAL
Status: DISCONTINUED | OUTPATIENT
Start: 2025-03-12 | End: 2025-03-13 | Stop reason: HOSPADM

## 2025-03-12 RX ORDER — FENTANYL CITRATE 50 UG/ML
25-50 INJECTION, SOLUTION INTRAMUSCULAR; INTRAVENOUS
Status: DISCONTINUED | OUTPATIENT
Start: 2025-03-12 | End: 2025-03-12 | Stop reason: HOSPADM

## 2025-03-12 RX ORDER — ONDANSETRON 2 MG/ML
INJECTION INTRAMUSCULAR; INTRAVENOUS PRN
Status: DISCONTINUED | OUTPATIENT
Start: 2025-03-12 | End: 2025-03-12

## 2025-03-12 RX ORDER — NALOXONE HYDROCHLORIDE 0.4 MG/ML
0.2 INJECTION, SOLUTION INTRAMUSCULAR; INTRAVENOUS; SUBCUTANEOUS
Status: DISCONTINUED | OUTPATIENT
Start: 2025-03-12 | End: 2025-03-12 | Stop reason: HOSPADM

## 2025-03-12 RX ORDER — FENTANYL CITRATE 50 UG/ML
50 INJECTION, SOLUTION INTRAMUSCULAR; INTRAVENOUS EVERY 5 MIN PRN
Status: DISCONTINUED | OUTPATIENT
Start: 2025-03-12 | End: 2025-03-13 | Stop reason: HOSPADM

## 2025-03-12 RX ORDER — CEFAZOLIN SODIUM 2 G/50ML
2 SOLUTION INTRAVENOUS SEE ADMIN INSTRUCTIONS
Status: DISCONTINUED | OUTPATIENT
Start: 2025-03-12 | End: 2025-03-12 | Stop reason: HOSPADM

## 2025-03-12 RX ORDER — LIDOCAINE HYDROCHLORIDE 20 MG/ML
INJECTION, SOLUTION INFILTRATION; PERINEURAL PRN
Status: DISCONTINUED | OUTPATIENT
Start: 2025-03-12 | End: 2025-03-12

## 2025-03-12 RX ORDER — PROPOFOL 10 MG/ML
INJECTION, EMULSION INTRAVENOUS CONTINUOUS PRN
Status: DISCONTINUED | OUTPATIENT
Start: 2025-03-12 | End: 2025-03-12

## 2025-03-12 RX ORDER — HYDROMORPHONE HYDROCHLORIDE 1 MG/ML
0.4 INJECTION, SOLUTION INTRAMUSCULAR; INTRAVENOUS; SUBCUTANEOUS EVERY 5 MIN PRN
Status: DISCONTINUED | OUTPATIENT
Start: 2025-03-12 | End: 2025-03-13 | Stop reason: HOSPADM

## 2025-03-12 RX ORDER — HYDROMORPHONE HYDROCHLORIDE 1 MG/ML
0.2 INJECTION, SOLUTION INTRAMUSCULAR; INTRAVENOUS; SUBCUTANEOUS EVERY 5 MIN PRN
Status: DISCONTINUED | OUTPATIENT
Start: 2025-03-12 | End: 2025-03-13 | Stop reason: HOSPADM

## 2025-03-12 RX ORDER — ACETAMINOPHEN 325 MG/1
975 TABLET ORAL ONCE
Status: COMPLETED | OUTPATIENT
Start: 2025-03-12 | End: 2025-03-12

## 2025-03-12 RX ORDER — ONDANSETRON 4 MG/1
4 TABLET, ORALLY DISINTEGRATING ORAL EVERY 30 MIN PRN
Status: DISCONTINUED | OUTPATIENT
Start: 2025-03-12 | End: 2025-03-13 | Stop reason: HOSPADM

## 2025-03-12 RX ORDER — LIDOCAINE 40 MG/G
CREAM TOPICAL
Status: DISCONTINUED | OUTPATIENT
Start: 2025-03-12 | End: 2025-03-12 | Stop reason: HOSPADM

## 2025-03-12 RX ORDER — CEFAZOLIN SODIUM 2 G/50ML
2 SOLUTION INTRAVENOUS
Status: COMPLETED | OUTPATIENT
Start: 2025-03-12 | End: 2025-03-12

## 2025-03-12 RX ORDER — SODIUM CHLORIDE, SODIUM LACTATE, POTASSIUM CHLORIDE, CALCIUM CHLORIDE 600; 310; 30; 20 MG/100ML; MG/100ML; MG/100ML; MG/100ML
INJECTION, SOLUTION INTRAVENOUS CONTINUOUS
Status: DISCONTINUED | OUTPATIENT
Start: 2025-03-12 | End: 2025-03-12 | Stop reason: HOSPADM

## 2025-03-12 RX ORDER — HYDROXYZINE HYDROCHLORIDE 25 MG/1
25 TABLET, FILM COATED ORAL 3 TIMES DAILY PRN
Qty: 20 TABLET | Refills: 0 | Status: SHIPPED | OUTPATIENT
Start: 2025-03-12

## 2025-03-12 RX ADMIN — PROPOFOL 150 MCG/KG/MIN: 10 INJECTION, EMULSION INTRAVENOUS at 12:05

## 2025-03-12 RX ADMIN — CEFAZOLIN SODIUM 2 G: 2 SOLUTION INTRAVENOUS at 12:00

## 2025-03-12 RX ADMIN — ONDANSETRON 4 MG: 2 INJECTION INTRAMUSCULAR; INTRAVENOUS at 12:27

## 2025-03-12 RX ADMIN — FENTANYL CITRATE 50 MCG: 50 INJECTION, SOLUTION INTRAMUSCULAR; INTRAVENOUS at 11:30

## 2025-03-12 RX ADMIN — GLYCOPYRROLATE 0.2 MG: 0.2 INJECTION, SOLUTION INTRAMUSCULAR; INTRAVENOUS at 12:03

## 2025-03-12 RX ADMIN — EPHEDRINE SULFATE 5 MG: 50 INJECTION, SOLUTION INTRAMUSCULAR; INTRAVENOUS; SUBCUTANEOUS at 12:13

## 2025-03-12 RX ADMIN — FENTANYL CITRATE 100 MCG: 50 INJECTION, SOLUTION INTRAMUSCULAR; INTRAVENOUS at 12:05

## 2025-03-12 RX ADMIN — DEXAMETHASONE SODIUM PHOSPHATE 4 MG: 4 INJECTION, SOLUTION INTRA-ARTICULAR; INTRALESIONAL; INTRAMUSCULAR; INTRAVENOUS; SOFT TISSUE at 12:27

## 2025-03-12 RX ADMIN — ACETAMINOPHEN 975 MG: 325 TABLET ORAL at 10:57

## 2025-03-12 RX ADMIN — LIDOCAINE HYDROCHLORIDE 60 MG: 20 INJECTION, SOLUTION INFILTRATION; PERINEURAL at 12:05

## 2025-03-12 RX ADMIN — PROPOFOL 200 MG: 10 INJECTION, EMULSION INTRAVENOUS at 12:05

## 2025-03-12 RX ADMIN — SODIUM CHLORIDE, SODIUM LACTATE, POTASSIUM CHLORIDE, CALCIUM CHLORIDE: 600; 310; 30; 20 INJECTION, SOLUTION INTRAVENOUS at 10:58

## 2025-03-12 RX ADMIN — EPHEDRINE SULFATE 5 MG: 50 INJECTION, SOLUTION INTRAMUSCULAR; INTRAVENOUS; SUBCUTANEOUS at 12:11

## 2025-03-12 RX ADMIN — BUPIVACAINE HYDROCHLORIDE 20 ML: 5 INJECTION, SOLUTION EPIDURAL; INTRACAUDAL; PERINEURAL at 11:30

## 2025-03-12 RX ADMIN — EPHEDRINE SULFATE 5 MG: 50 INJECTION, SOLUTION INTRAMUSCULAR; INTRAVENOUS; SUBCUTANEOUS at 12:27

## 2025-03-12 RX ADMIN — PROPOFOL 50 MCG/KG/MIN: 10 INJECTION, EMULSION INTRAVENOUS at 11:31

## 2025-03-12 RX ADMIN — BUPIVACAINE HYDROCHLORIDE 10 ML: 5 INJECTION, SOLUTION EPIDURAL; INTRACAUDAL; PERINEURAL at 11:30

## 2025-03-12 RX ADMIN — EPHEDRINE SULFATE 5 MG: 50 INJECTION, SOLUTION INTRAMUSCULAR; INTRAVENOUS; SUBCUTANEOUS at 12:34

## 2025-03-12 RX ADMIN — EPHEDRINE SULFATE 5 MG: 50 INJECTION, SOLUTION INTRAMUSCULAR; INTRAVENOUS; SUBCUTANEOUS at 12:39

## 2025-03-12 NOTE — OR NURSING
Patient received right side Sciatic nerve block  without Exparel.  Fentanyl 50mcg and Versed 2mg given. Tolerated procedure well.   Dipak Simpson RN

## 2025-03-12 NOTE — ANESTHESIA PROCEDURE NOTES
Airway       Patient location during procedure: OR       Procedure Start/Stop Times: 3/12/2025 12:05 PM and 3/12/2025 12:08 PM  Staff -        CRNA: Michael Christiansen APRN CRNA       Performed By: CRNAIndications and Patient Condition       Indications for airway management: radha-procedural       Induction type:intravenous       Mask difficulty assessment: 1 - vent by mask    Final Airway Details       Final airway type: supraglottic airway    Supraglottic Airway Details        Type: LMA       Brand: I-Gel       LMA size: 4    Post intubation assessment        Placement verified by: capnometry and equal breath sounds        Number of attempts at approach: 1       Secured with: tape       Ease of procedure: easy       Dentition: Intact and Unchanged    Medication(s) Administered   Medication Administration Time: 3/12/2025 12:05 PM

## 2025-03-12 NOTE — ANESTHESIA CARE TRANSFER NOTE
Patient: Manuel Goddard    Procedure: Procedure(s):  right partial talus, tibia, and first metatarsal excision       Diagnosis: Pain in joint, ankle and foot, right [M25.571]  Diagnosis Additional Information: No value filed.    Anesthesia Type:   General     Note:    Oropharynx: oropharynx clear of all foreign objects and spontaneously breathing  Level of Consciousness: awake and drowsy  Oxygen Supplementation: nasal cannula  Level of Supplemental Oxygen (L/min / FiO2): 3  Independent Airway: airway patency satisfactory and stable  Dentition: dentition unchanged  Vital Signs Stable: post-procedure vital signs reviewed and stable    Patient transferred to: PACU    Handoff Report: Identifed the Patient, Identified the Reponsible Provider, Reviewed the pertinent medical history, Discussed the surgical course, Reviewed Intra-OP anesthesia mangement and issues during anesthesia, Set expectations for post-procedure period and Allowed opportunity for questions and acknowledgement of understanding      Vitals:  Vitals Value Taken Time   BP     Temp 35.9  C (96.62  F) 03/12/25 1316   Pulse 56 03/12/25 1316   Resp 14 03/12/25 1316   SpO2 99 % 03/12/25 1316   Vitals shown include unfiled device data.    Electronically Signed By: STEWART Russo CRNA  March 12, 2025  1:17 PM

## 2025-03-12 NOTE — BRIEF OP NOTE
[facility] [place]    Brief Operative Note    Pre-operative diagnosis: Pain in joint, ankle and foot, right [M25.571]  Post-operative diagnosis Same as pre-operative diagnosis    Procedure: right partial talus, tibia, and first metatarsal excision, Right - Ankle    Surgeon: Surgeons and Role:     * Jordan Correa MD - Primary     * Manuel Gagnon PA-C - Assisting  Anesthesia: Choice with Block   Estimated Blood Loss: Less than 50 ml    Drains: None  Specimens: * No specimens in log *  Findings:   None.  Complications: None.  Implants: * No implants in log *    Plan:  Same Day surgery discharge to home once criteria met.  Splint to remain on right  lower extremity and NWB.  Norco and Vistaril for pain.  No dressing change on own.  Leave dressing on until 2 weeks follow up appointment.  F/U in clinic in 2 weeks    I was asked by Dr. Correa to assist with surgery. I positioned and prepped the patient. I retracted soft tissue.   I suctioned fluids when needed. I provided traction for dissection. I helped to ligate blood vessels. I helped Dr. Correa identify and protect important structures. The procedure was medically necessary for an assistant because Dr. Correa needed the operative exposure and assistance that I provided. This allowed him to safely and efficiently operate. It was also important that I help ligate blood vessels to maintain hemostasis and reduce the bleeding risk. I helped with the closure of the operative incisions as well as helping with the boot/cast/splint.  The assistance that I provided reduced operative time which meant less general anesthetic for the patient. No qualified residents were available to assist.    Manuel Gagnon PA-C

## 2025-03-12 NOTE — ANESTHESIA POSTPROCEDURE EVALUATION
Patient: Manuel Goddard    Procedure: Procedure(s):  right partial talus, tibia, and first metatarsal excision       Anesthesia Type:  General    Note:  Disposition: Outpatient   Postop Pain Control: Uneventful            Sign Out: Well controlled pain   PONV: No   Neuro/Psych: Uneventful            Sign Out: Acceptable/Baseline neuro status   Airway/Respiratory: Uneventful            Sign Out: Acceptable/Baseline resp. status   CV/Hemodynamics: Uneventful            Sign Out: Acceptable CV status; No obvious hypovolemia; No obvious fluid overload   Other NRE: NONE   DID A NON-ROUTINE EVENT OCCUR? No           Last vitals:  Vitals Value Taken Time   /60 03/12/25 1315   Temp 35.9  C (96.62  F) 03/12/25 1323   Pulse 54 03/12/25 1323   Resp 17 03/12/25 1323   SpO2 100 % 03/12/25 1323   Vitals shown include unfiled device data.    Electronically Signed By: Arnulfo Velasquez MD  March 12, 2025  1:25 PM

## 2025-03-12 NOTE — ADDENDUM NOTE
Addendum  created 03/12/25 1336 by Arnulfo Velasquez MD    Attestation recorded in Intraprocedure, Child order released for a procedure order, Clinical Note Signed, Flowsheet accepted, Intraprocedure Attestations filed, Intraprocedure Blocks edited, Pend clinical note, SmartForm saved

## 2025-03-12 NOTE — ANESTHESIA PROCEDURE NOTES
"Sciatic Procedure Note    Pre-Procedure   Staff -        Anesthesiologist:  Arnulfo Velasquez MD       Performed By: anesthesiologist       Location: pre-op       Procedure Start/Stop Times: 3/12/2025 11:30 AM and 3/12/2025 11:50 AM       Pre-Anesthestic Checklist: patient identified, IV checked, site marked, risks and benefits discussed, informed consent, monitors and equipment checked, pre-op evaluation, at physician/surgeon's request and post-op pain management  Timeout:       Correct Patient: Yes        Correct Procedure: Yes        Correct Site: Yes        Correct Position: Yes        Correct Laterality: Yes        Site Marked: Yes  Procedure Documentation  Procedure: Sciatic         Laterality: right       Patient Position: LLD       Skin prep: Chloraprep (popliteal approach).                Ultrasound guided       1. Ultrasound was used to identify targeted nerve, plexus, vascular marker, or fascial plane and place a needle adjacent to it in real-time.       2. Ultrasound was used to visualize the spread of anesthetic in close proximity to the above referenced structure.       3. A permanent image is entered into the patient's record.       4. The visualized anatomic structures appeared normal.       5. There were no apparent abnormal pathologic findings.    Assessment/Narrative         The placement was negative for: blood aspirated, painful injection and site bleeding       Paresthesias: No.       Bolus given via needle. no blood aspirated via catheter.        Secured via.        Insertion/Infusion Method: Single Shot    Medication(s) Administered   Bupivacaine 0.5% PF (Infiltration) - Infiltration   20 mL - 3/12/2025 11:30:00 AM  Medication Administration Time: 3/12/2025 11:30 AM      FOR [facility] ([facility]) ONLY:   Pain Team Contact information: please page the Pain Team Via [employer]. Search \"Pain\". During daytime hours, please page the attending first. At night please page the resident first.      "

## 2025-03-12 NOTE — ANESTHESIA PROCEDURE NOTES
"Adductor canal Procedure Note    Pre-Procedure   Staff -        Anesthesiologist:  Arnulfo Velasquez MD       Performed By: anesthesiologist       Location: pre-op       Procedure Start/Stop Times: 3/12/2025 11:30 AM and 3/12/2025 11:50 AM       Pre-Anesthestic Checklist: patient identified, IV checked, site marked, risks and benefits discussed, informed consent, monitors and equipment checked, pre-op evaluation, at physician/surgeon's request and post-op pain management  Timeout:       Correct Patient: Yes        Correct Procedure: Yes        Correct Site: Yes        Correct Position: Yes        Correct Laterality: Yes        Site Marked: Yes  Procedure Documentation  Procedure: Adductor canal         Laterality: right       Patient Position: LLD       Skin prep: Chloraprep       Needle Gauge: 21.        Needle Length (Inches): 3.13        Ultrasound guided       1. Ultrasound was used to identify targeted nerve, plexus, vascular marker, or fascial plane and place a needle adjacent to it in real-time.       2. Ultrasound was used to visualize the spread of anesthetic in close proximity to the above referenced structure.       3. A permanent image is entered into the patient's record.       4. The visualized anatomic structures appeared normal.       5. There were no apparent abnormal pathologic findings.    Assessment/Narrative         The placement was negative for: blood aspirated, painful injection and site bleeding       Paresthesias: No.       Bolus given via needle. no blood aspirated via catheter.        Secured via.        Insertion/Infusion Method: Single Shot    Medication(s) Administered   Bupivacaine 0.5% PF (Infiltration) - Infiltration   10 mL - 3/12/2025 11:30:00 AM  Medication Administration Time: 3/12/2025 11:30 AM      FOR [facility] ([facility]) ONLY:   Pain Team Contact information: please page the Pain Team Via [email or web address]. Search \"Pain\". During daytime hours, please page the attending first. At night " please page the resident first.

## 2025-03-12 NOTE — OP NOTE
Date of surgery: 3/12/2025      Preoperative diagnosis: Painful exostosis right tibia talus and first metatarsal     Postoperative diagnosis: Painful exostosis right tibia talus and first metatarsal     Procedure: Right tibia partial excision.  Right talus partial excision.  Right first metatarsal partial excision.     Surgeons: Jordan Correa MD     Assistants: Alcides Gagnon PA-C     Complications: None     Drains: None     EBL: Less than 20 cc     Anesthesia: General Endotracheal     Indications: Please refer to clinic note for further details     Procedure note: On 3/12/2025 patient was taken to surgery.  Preoperative antibiotics were administered to the patient prior to arrival to the OR     After successful induction of general endotracheal anesthesia he  was placed supine on the table.  The right lower extremity was prepped and draped in sterile fashion.  After exsanguination by gravity, tourniquet cuff was inflated to 300 mmHg on the proximal third of the right thigh.      The pause for the cause was performed according to our institutional policy which confirmed laterality of the procedure.     An incision was made on the medial aspect of the ankle along the previous surgical incision.  Subcutaneous's were dissected.  We proceeded then with notification of the medial exostosis which was documentation of the tibia and the talus.  With the lower tarsal tunnel will proceed with a large excision of the bone which was measured to be approximately 6 x 8 x 4 cm in total.  We were able to visualize the posterior talus tendon and we never proceeded with any dissection lateral to it in order to avoid any conflict with the neurovascular bundle.  We confirmed absence of any residual impingement.  The surface of the bones were filed down in order to provide a smooth surface and eventually were covered with bone wax in order to prevent any accumulation of her hematoma.    Another incision was made on the medial aspect of the  first MTP joint.  Potentials were dissected and we acidification of the most plantar aspect of the first metatarsal exostosis.  Similar speaking with a large osteotome we proceed with a resection of the most plantar aspect first metatarsal measuring approximately 3 x 3 x 1 cm.  We again applied bone wax to the exposed bone.  We confirmed through manual palpation and absence of any protrusion plantarly.     Tourniquet was deflated.  Satisfactory hemostasis was accomplished.  Wound was closed in layers.  Sterile dressings were applied.  Patient was placed in a plaster splint and transferred in stable condition to PACU.      Plan: Patient will remain nonweightbearing x 2 weeks.  The reason for the patient to be placed in a plaster splint to because he could not fit in a regular cam walker given the size of his ankle.  Patient will return to clinic at 2 weeks from surgery and at that time sutures were removed indicated and he will proceed with weightbearing as tolerated in the presence of satisfactory healing of the soft tissues.  Patient will not have any issue or restriction if the wounds are completely healed after the 2-week appointment.  Patient is not expected to require any physical therapy for the first 6 weeks from surgery.    Patient will be reevaluated at 6-year from surgery and at that time no x-rays will be obtained.    Jordan Correa MD

## 2025-03-12 NOTE — ANESTHESIA PREPROCEDURE EVALUATION
Anesthesia Pre-Procedure Evaluation    Patient: Manuel Goddard   MRN: [id number] : 1985        Procedure : Procedure(s):  right partial talus, tibia, and first metatarsal excision          Past Medical History:   Diagnosis Date     Chronic osteoarthritis      Degenerative joint disease      Hemihypertrophy of lower extremity       Past Surgical History:   Procedure Laterality Date     KNEE SURGERY Right     growth plant stunting on right for leg length discrepancy     leg surgeries      5-6 debulking surgeries of R lower leg and stunding of the growth plate      No Known Allergies   Social History     Tobacco Use     Smoking status: Never     Smokeless tobacco: Never   Substance Use Topics     Alcohol use: Yes     Alcohol/week: 6.0 standard drinks of alcohol     Types: 6 Standard drinks or equivalent per week     Comment: 3-4 days a week, 1-2 drinks at a time      Wt Readings from Last 1 Encounters:   25 70.8 kg (156 lb)        Anesthesia Evaluation   Pt has had prior anesthetic. Type: General and MAC.    No history of anesthetic complications       ROS/MED HX  ENT/Pulmonary:  - neg pulmonary ROS     Neurologic:  - neg neurologic ROS     Cardiovascular:  - neg cardiovascular ROS     METS/Exercise Tolerance: >4 METS    Hematologic:       Musculoskeletal: Comment: Congenital       GI/Hepatic:  - neg GI/hepatic ROS     Renal/Genitourinary:  - neg Renal ROS     Endo:  - neg endo ROS     Psychiatric/Substance Use:  - neg psychiatric ROS     Infectious Disease:  - neg infectious disease ROS     Malignancy:  - neg malignancy ROS     Other:  - neg other ROS    (+)  , H/O Chronic Pain,       Physical Exam    Airway  airway exam normal      Mallampati: I   TM distance: > 3 FB   Neck ROM: full   Mouth opening: > 3 cm    Respiratory Devices and Support         Dental       (+) Minor Abnormalities - some fillings, tiny chips      Cardiovascular   cardiovascular exam normal       Rhythm and rate: regular and  "normal     Pulmonary   pulmonary exam normal        breath sounds clear to auscultation       OUTSIDE LABS:  CBC:   Lab Results   Component Value Date    WBC 3.3 (L) 11/22/2024    WBC 2.4 (L) 02/19/2024    HGB 15.0 11/22/2024    HGB 14.6 02/19/2024    HCT 45.2 11/22/2024    HCT 43.0 02/19/2024     11/22/2024     02/19/2024     BMP:   Lab Results   Component Value Date     11/22/2024     04/06/2021    POTASSIUM 4.3 11/22/2024    POTASSIUM 4.0 04/06/2021    CHLORIDE 102 11/22/2024    CHLORIDE 105 04/06/2021    CO2 26 11/22/2024    CO2 25 04/06/2021    BUN 12.9 11/22/2024    BUN 16 04/06/2021    CR 1.01 11/22/2024    CR 1.12 02/19/2024    GLC 92 11/22/2024    GLC 98 04/06/2021     COAGS: No results found for: \"PTT\", \"INR\", \"FIBR\"  POC: No results found for: \"BGM\", \"HCG\", \"HCGS\"  HEPATIC:   Lab Results   Component Value Date    ALBUMIN 4.5 11/22/2024    PROTTOTAL 6.9 11/22/2024    ALT 17 11/22/2024    AST 21 11/22/2024    ALKPHOS 52 11/22/2024    BILITOTAL 0.7 11/22/2024     OTHER:   Lab Results   Component Value Date    MARIVEL 9.7 11/22/2024    TSH 2.61 04/06/2021    SED 4 04/06/2021       Anesthesia Plan    ASA Status:  1    NPO Status:  NPO Appropriate    Anesthesia Type: General.   Induction: Intravenous.   Maintenance: TIVA.        Consents    Anesthesia Plan(s) and associated risks, benefits, and realistic alternatives discussed. Questions answered and patient/representative(s) expressed understanding.     - Discussed: Risks, Benefits and Alternatives for the PROCEDURE were discussed     - Discussed with:  Patient      - Extended Intubation/Ventilatory Support Discussed: No.      - Patient is DNR/DNI Status: No     Use of blood products discussed: No .     Postoperative Care    Pain management: IV analgesics.   PONV prophylaxis: Ondansetron (or other 5HT-3)     Comments:             Arnulfo Velasquez MD    I have reviewed the pertinent notes and labs in the chart from the past 30 days and " (re)examined the patient.  Any updates or changes from those notes are reflected in this note.    Clinically Significant Risk Factors Present on Admission

## 2025-03-12 NOTE — DISCHARGE INSTRUCTIONS
"[facility]  Home Care Following Anesthesia  For 24 hours after surgery:  Get plenty of rest.  A responsible adult must stay with you for at least 24 hours after you leave the surgery center.  Do not drive or use heavy equipment.  If you have weakness or tingling, don't drive or use heavy equipment until this feeling goes away.   Do not drink alcohol.   Avoid strenuous or risky activities.  Ask for help when climbing stairs.  You may feel lightheaded.  IF so, sit for a few minutes before standing.  Have someone help you get up.   If you have nausea (feel sick to your stomach): Drink only clear liquids such as apple juice, ginger ale, broth or 7-Up.  Rest may also help.  Be sure to drink enough fluids.  Move to a regular diet as you feel able.   You may have a slight fever.  Call the doctor if your fever is over 100 F (37.7 C) (taken under the tongue) or lasts longer than 24 hours.  You may have a dry mouth, a sore throat, muscle aches or trouble sleeping. These should go away after 24 hours.  Do not make important or legal decisions.   It is recommended to avoid smoking.        Today you received a Marcaine or bupivacaine block to numb the nerves near your surgery site.  This is a block using local anesthetic or \"numbing\" medication injected around the nerves to anesthetize or \"numb\" the area supplied by those nerves.  This block is injected into the muscle layer near your surgical site.  The medication may numb the location where you had surgery for 6-18 hours, but may last up to 24 hours.  If your surgical site is an arm or leg you should be careful with your affected limb, since it is possible to injure your limb without being aware of it due to the numbing.  Until full feeling returns, you should guard against bumping or hitting your limb, and avoid extreme hot or cold temperatures on the skin.  As the block wears off, the feeling will return as a tingling or prickly " sensation near your surgical site.  You will experience more discomfort from your incision as the feeling returns.  You may want to take a pain pill (a narcotic or Tylenol if this was prescribed by your surgeon) when you start to experience mild pain before the pain beccomes more severe.  If your pain medications do not control your pain you should notifiy your surgeon.    Tips for taking pain medications  To get the best pain relief possible, remember these points:  Take pain medications as directed, before pain becomes severe.  Pain medication can upset your stomach: taking it with food may help.  Constipation is a common side effect of pain medication. Drink plenty of  fluids.  Eat foods high in fiber. Take a stool softener if recommended by your doctor or pharmacist.  Do not drink alcohol, drive or operate machinery while taking pain medications.  Ask about other ways to control pain, such as with heat, ice or relaxation.    Tylenol/Acetaminophen Consumption    If you feel your pain relief is insufficient, you may take Tylenol/Acetaminophen in addition to your narcotic pain medication.   Be careful not to exceed 4,000 mg of Tylenol/Acetaminophen in a 24 hour period from all sources.  If you are taking extra strength Tylenol/acetaminophen (500 mg), the maximum dose is 8 tablets in 24 hours.  If you are taking regular strength acetaminophen (325 mg), the maximum dose is 12 tablets in 24 hours.    Call a doctor for any of the following:  Signs of infection (fever, growing tenderness at the surgery site, a large amount of drainage or bleeding, severe pain, foul-smelling drainage, redness, swelling).  It has been over 8 to 10 hours since surgery and you are still not able to urinate (pass water).  Headache for over 24 hours.  Numbness, tingling or weakness the day after surgery (if you had spinal anesthesia).  Signs of Covid-19 infection (temperature over 100 degrees, shortness of breath, cough, loss of taste/smell,  generalized body aches, persistent headache, chills, sore throat, nausea/vomiting/diarrhea)    Your doctor is:       Dr. Jordan Correa, Orthopaedics: [phone]               After hours and weekends call the hospital @ [phone] and ask for the resident on call for:  Orthopaedics  For emergency care, call the:  [facility] Emergency Department: [phone] (TTY for hearing impaired: [phone])

## 2025-03-17 ENCOUNTER — TELEPHONE (OUTPATIENT)
Dept: ORTHOPEDICS | Facility: CLINIC | Age: 40
End: 2025-03-17
Payer: COMMERCIAL

## 2025-03-17 NOTE — TELEPHONE ENCOUNTER
Other: Patient had surgery with Dr. Correa on 03/12/25. Patient said his R foot is still swollen can't move around without feeling pressure otherwise is doesn't hurt and it feels very swollen. It just happened about a day ago. Patient would like a call back to discuss further treatment.      Could we send this information to you in [place] or would you prefer to receive a phone call?:   Patient would prefer a phone call   Okay to leave a detailed message?: Yes at Cell number on file:    Telephone Information:   Mobile [phone]

## 2025-03-18 NOTE — CONFIDENTIAL NOTE
Returned call to patient. Patient is experiencing a throbbing pain when his foot is in a dependent position. Pain goes away once he elevates his leg. Denies any new numbness/tingling. He is able to wiggles his toes. Discussed this is expected and he should continue aggressive elevation and icing to help with pain and swelling. He will call if he has new or worsening symptoms. Clinic and after hours number provided.     Tara Holter, RNCC

## 2025-03-24 ENCOUNTER — OFFICE VISIT (OUTPATIENT)
Dept: ORTHOPEDICS | Facility: CLINIC | Age: 40
End: 2025-03-24
Payer: COMMERCIAL

## 2025-03-24 DIAGNOSIS — Z98.890 STATUS POST FOOT SURGERY: Primary | ICD-10-CM

## 2025-03-24 PROCEDURE — 99207 PR NO CHARGE NURSE ONLY: CPT | Performed by: PHYSICIAN ASSISTANT

## 2025-03-24 NOTE — PROGRESS NOTES
Reason for visit:    Manuel Goddard came in to the clinic for a two week post op check.    His surgery was done 3/12/2025 by Dr Correa.  He had a right tibia partial excision. Right talus partial excision. Right first metatarsal partial excision.     Assessment:    Manuel came into the clinic in a plaster posterior short leg splint Non-WB, using crutches.    The Surgical wounds were exposed and found to be well-healed; so the sutures were removed. Skin was c/d/I. No swelling noted. Steri strips were applied. Manuel states he is doing very well overall.    Plan:     He was placed in a sock and his normal shoe.  He was told he may be WBAT. He has no restrictions and physical therapy is not required.     He has an appointment to see Dr. Correa at 6 weeks post op and at that time Dr. Correa will determine further restrictions.    All questions were answered. He has our phone number and will call with additional questions or problems.    Hui Humphrey PA-C is the overseeing provider on site today.

## 2025-03-26 ENCOUNTER — TELEPHONE (OUTPATIENT)
Dept: ORTHOPEDICS | Facility: CLINIC | Age: 40
End: 2025-03-26
Payer: COMMERCIAL

## 2025-03-26 NOTE — TELEPHONE ENCOUNTER
[facility] Call Center    Phone Message    May a detailed message be left on voicemail: yes     Reason for Call: Patient has Irritation around Surgical Site. Please call Patient. Thanks    Action Taken: Message routed to:  [facility] ([facility]): maria elena    Travel Screening: Not Applicable     Date of Service:

## 2025-04-22 ENCOUNTER — OFFICE VISIT (OUTPATIENT)
Dept: ORTHOPEDICS | Facility: CLINIC | Age: 40
End: 2025-04-22
Payer: COMMERCIAL

## 2025-04-22 DIAGNOSIS — M25.571 PAIN IN JOINT, ANKLE AND FOOT, RIGHT: ICD-10-CM

## 2025-04-22 DIAGNOSIS — Z98.890 STATUS POST FOOT SURGERY: Primary | ICD-10-CM

## 2025-04-22 PROCEDURE — 99024 POSTOP FOLLOW-UP VISIT: CPT | Performed by: ORTHOPAEDIC SURGERY

## 2025-04-22 PROCEDURE — 1125F AMNT PAIN NOTED PAIN PRSNT: CPT | Performed by: ORTHOPAEDIC SURGERY

## 2025-04-22 NOTE — PROGRESS NOTES
Chief complaint: Status post right tibia talus and first metatarsal partial excision performed on March 12, 2025    Patient presents today for further follow-up he reports to be quite pleased with results of her.  Reports not to be 100% back to normal as he is still dealing with some scar tissue and swelling.    On today's exam he presents with well-healed surgical incisions there is a clearly marked difference with regards to the plantar aspect of the first MTP joint.  The ankle still present some swelling but currently there is significantly less bony prominence medially.    Assessment: Status post right tibia talus and first metatarsal partial excision    Plan: Discussed with patient to proceed with activities as tolerated.  A prescription for physical therapy was given to the patient with an emphasis on deep tissue massage and scar management.    Patient will follow-up in 3 months from today if is not happy the function of his ankle or foot otherwise he will follow-up on as needed basis.    All questions were answered.

## 2025-04-22 NOTE — NURSING NOTE
Reason For Visit:   Chief Complaint   Patient presents with    Surgical Followup     6 week post op follow-up of right partial talus, tibia, and first metatarsal excision - Right DOS:3/12/2025         39 year old  1985      Primary MD: Lewis Andrade  Ref. MD: delilah      There were no vitals taken for this visit.    Pain Assessment  Patient Currently in Pain: Yes  0-10 Pain Scale: 7  Primary Pain Location: Foot (right)            Oscar Graham ATC

## 2025-04-22 NOTE — LETTER
4/22/2025      Manuel Goddard  [address]  [place] [place] [facility] [address]      Dear Colleague,    Thank you for referring your patient, Manuel Goddard, to the [facility] [place]. Please see a copy of my visit note below.    Chief complaint: Status post right tibia talus and first metatarsal partial excision performed on March 12, 2025    Patient presents today for further follow-up he reports to be quite pleased with results of her.  Reports not to be 100% back to normal as he is still dealing with some scar tissue and swelling.    On today's exam he presents with well-healed surgical incisions there is a clearly marked difference with regards to the plantar aspect of the first MTP joint.  The ankle still present some swelling but currently there is significantly less bony prominence medially.    Assessment: Status post right tibia talus and first metatarsal partial excision    Plan: Discussed with patient to proceed with activities as tolerated.  A prescription for physical therapy was given to the patient with an emphasis on deep tissue massage and scar management.    Patient will follow-up in 3 months from today if is not happy the function of his ankle or foot otherwise he will follow-up on as needed basis.    All questions were answered.      Again, thank you for allowing me to participate in the care of your patient.        Sincerely,        Jordan Correa MD    Electronically signed

## 2025-04-25 PROBLEM — Z47.89 AFTERCARE FOLLOWING SURGERY OF THE MUSCULOSKELETAL SYSTEM: Status: ACTIVE | Noted: 2025-04-25

## 2025-05-01 ENCOUNTER — THERAPY VISIT (OUTPATIENT)
Dept: PHYSICAL THERAPY | Facility: CLINIC | Age: 40
End: 2025-05-01
Payer: COMMERCIAL

## 2025-05-01 DIAGNOSIS — Z47.89 AFTERCARE FOLLOWING SURGERY OF THE MUSCULOSKELETAL SYSTEM: ICD-10-CM

## 2025-05-01 DIAGNOSIS — M25.571 PAIN IN JOINT, ANKLE AND FOOT, RIGHT: Primary | ICD-10-CM

## 2025-05-17 ENCOUNTER — HEALTH MAINTENANCE LETTER (OUTPATIENT)
Age: 40
End: 2025-05-17

## 2025-06-07 ENCOUNTER — MYC REFILL (OUTPATIENT)
Dept: FAMILY MEDICINE | Facility: CLINIC | Age: 40
End: 2025-06-07

## 2025-06-07 DIAGNOSIS — Z20.6 CONTACT WITH AND (SUSPECTED) EXPOSURE TO HUMAN IMMUNODEFICIENCY VIRUS (HIV): ICD-10-CM

## 2025-06-07 DIAGNOSIS — Z11.3 ROUTINE SCREENING FOR STI (SEXUALLY TRANSMITTED INFECTION): Primary | ICD-10-CM

## 2025-06-09 NOTE — TELEPHONE ENCOUNTER
Medication requested: emtricitabine-tenofovir (TRUVADA) 200-300 MG per tablet   Last office visit: 3/6/25  [facility] appointments: none  Medication last refilled: 3/5/25; 90 + 0 refills  Last qualifying labs: to be collected    Pt will schedule lab appt.    Manuel REED, RN  06/09/25 9:33 AM

## 2025-06-10 ENCOUNTER — LAB (OUTPATIENT)
Dept: LAB | Facility: CLINIC | Age: 40
End: 2025-06-10
Payer: COMMERCIAL

## 2025-06-10 DIAGNOSIS — Z20.6 CONTACT WITH AND (SUSPECTED) EXPOSURE TO HUMAN IMMUNODEFICIENCY VIRUS (HIV): ICD-10-CM

## 2025-06-10 DIAGNOSIS — Z11.3 ROUTINE SCREENING FOR STI (SEXUALLY TRANSMITTED INFECTION): ICD-10-CM

## 2025-06-10 LAB — T PALLIDUM AB SER QL: NONREACTIVE

## 2025-06-11 ENCOUNTER — RESULTS FOLLOW-UP (OUTPATIENT)
Dept: FAMILY MEDICINE | Facility: CLINIC | Age: 40
End: 2025-06-11

## 2025-06-11 LAB
C TRACH DNA SPEC QL PROBE+SIG AMP: NEGATIVE
HIV 1+2 AB+HIV1 P24 AG SERPL QL IA: NONREACTIVE
HIV1 RNA # PLAS NAA DL=20: NOT DETECTED COPIES/ML
N GONORRHOEA DNA SPEC QL NAA+PROBE: NEGATIVE
SPECIMEN TYPE: NORMAL

## 2025-06-11 RX ORDER — EMTRICITABINE AND TENOFOVIR DISOPROXIL FUMARATE 200; 300 MG/1; MG/1
1 TABLET, FILM COATED ORAL DAILY
Qty: 90 TABLET | Refills: 0 | Status: SHIPPED | OUTPATIENT
Start: 2025-06-11

## 2025-06-11 NOTE — TELEPHONE ENCOUNTER
Component      Latest Ref Rng 6/10/2025  10:39 AM   HIV Antigen Antibody Combo      Nonreactive  Nonreactive      Prescription approved per [facility] Refill Protocol.    Manuel ERED, RN  06/11/25 11:04 AM

## (undated) DEVICE — ESU GROUND PAD ADULT W/CORD E7507

## (undated) DEVICE — PACK LOWER EXTREMITY CUSTOM ASC

## (undated) DEVICE — SUCTION MANIFOLD NEPTUNE 2 SYS 4 PORT 0702-020-000

## (undated) DEVICE — LINEN ORTHO PACK 5446

## (undated) DEVICE — BLADE KNIFE SURG 10 371110

## (undated) DEVICE — GLOVE BIOGEL PI MICRO SZ 7.5 48575

## (undated) DEVICE — GOWN XLG DISP 9545

## (undated) DEVICE — PREP CHLORAPREP 26ML TINTED HI-LITE ORANGE 930815

## (undated) DEVICE — SU ETHILON 3-0 PS-1 18" 1663H

## (undated) DEVICE — SOL NACL 0.9% IRRIG 500ML BOTTLE 2F7123

## (undated) DEVICE — GLOVE BIOGEL PI MICRO INDICATOR UNDERGLOVE SZ 8.0 48980

## (undated) DEVICE — DRSG XEROFORM 1X8"

## (undated) RX ORDER — FENTANYL CITRATE 50 UG/ML
INJECTION, SOLUTION INTRAMUSCULAR; INTRAVENOUS
Status: DISPENSED
Start: 2025-03-12

## (undated) RX ORDER — CEFAZOLIN SODIUM 2 G/50ML
SOLUTION INTRAVENOUS
Status: DISPENSED
Start: 2025-03-12

## (undated) RX ORDER — GLYCOPYRROLATE 0.2 MG/ML
INJECTION, SOLUTION INTRAMUSCULAR; INTRAVENOUS
Status: DISPENSED
Start: 2025-03-12

## (undated) RX ORDER — PROPOFOL 10 MG/ML
INJECTION, EMULSION INTRAVENOUS
Status: DISPENSED
Start: 2025-03-12

## (undated) RX ORDER — ACETAMINOPHEN 325 MG/1
TABLET ORAL
Status: DISPENSED
Start: 2025-03-12

## (undated) RX ORDER — EPHEDRINE SULFATE 50 MG/ML
INJECTION, SOLUTION INTRAMUSCULAR; INTRAVENOUS; SUBCUTANEOUS
Status: DISPENSED
Start: 2025-03-12